# Patient Record
Sex: MALE | Race: WHITE | Employment: OTHER | URBAN - METROPOLITAN AREA
[De-identification: names, ages, dates, MRNs, and addresses within clinical notes are randomized per-mention and may not be internally consistent; named-entity substitution may affect disease eponyms.]

---

## 2017-01-18 ENCOUNTER — OFFICE VISIT (OUTPATIENT)
Dept: CARDIOLOGY CLINIC | Age: 67
End: 2017-01-18

## 2017-01-18 VITALS
SYSTOLIC BLOOD PRESSURE: 136 MMHG | WEIGHT: 168 LBS | HEIGHT: 67 IN | BODY MASS INDEX: 26.37 KG/M2 | HEART RATE: 60 BPM | DIASTOLIC BLOOD PRESSURE: 82 MMHG

## 2017-01-18 DIAGNOSIS — I49.3 PVC (PREMATURE VENTRICULAR CONTRACTION): Primary | ICD-10-CM

## 2017-01-18 DIAGNOSIS — E78.00 HYPERCHOLESTEREMIA: ICD-10-CM

## 2017-01-18 DIAGNOSIS — R00.2 PALPITATIONS: ICD-10-CM

## 2017-01-18 DIAGNOSIS — I34.1 MITRAL PROLAPSE: ICD-10-CM

## 2017-01-18 NOTE — PROGRESS NOTES
Cardiac Electrophysiology Office Note     Subjective: Suresh Yuan is a 74 y.o. male patient who is seen for evaluation of PVCs   He is kindly referred by Dr. Raul Villa. He is here today for annual follow up. He is on flecainide. He says he has daily palpitations that are very brief < 1 minute. No SOB or chest pain. Occasional chest cramp he thinks d/t hunger. No LE edema or recent hospitalizations. Past hx:  He reports he has PVCs for a \"long time\". For several years it has been transient and rarely bothered him. The past couple months he has noticed them more and thinks they have been occuring more frequently after spring break trip with 2 children in high school  He reports he feels the sensation of a skipped beat. Associated symptoms include \"queezy feeling\". He started the flecainide when he returned from his trip to [place]. So far he is tolerating the flecainide , no lightheadedness or dizziness. No chest pain or SOB. He had emailed us before his trip and reported that he thought the PVCs were actually bothering him more than he thought they did and that the PVCs were disturbing his sleep. He says that when he went on his trip he had no symptoms of PVCs (had not started flecainide yet). He started the flecainide when he got back and he thinks it is helping, but he sometimes has brief episodes of PVCs when he is exercising. Pmhx includes hyperlipidemia. Exercise stress nuclear test 8/2016 showed   Good exercise capacity. No ischemia on ECG, PVC, but there are frequent PACs  BP was very high 250/100 with exercise.  Perfusion images showed diaphragmatic attenuation artifacts in the inferior and inferolateral walls (base to distal)  GATED SPECT: LVEF 69% normal wall motion and thickening    Overall study: no ischemia or infarct  PACs but no PVC  Flecainide is effective in suppressing PVCs  BP is high and recommend BP medication: norvasc 5 mg every day to start    Holter before flecainide showed 33 thousands PVCs, mostly one morphology and appears to come from outflow tract but there is another PVC with superior axis and not coming from same place     Normal stress echo  and normal EF on recent echo        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 5 children (all in good health), . Half brother may have a pacemaker. Stress echo 12 with Dr Ev Thompson  Normal resting electrocardiogram.  Above average functional capacity (>20%).   Appropriate heart rate response to exercise.  Normal resting blood pressure.  Appropriate blood pressure response to exercise. No chest pain.  No arrhythmias.  No ST changes.  Overall impression: Normal stress electrocardiogram.  Resting echo with normal regional and  global contractility.  Post exercise, uniformly enhanced myocardial contractility. Patient Active Problem List    Diagnosis Date Noted    Advanced directives, counseling/discussion 2016    PVC (premature ventricular contraction) 2016    Erectile dysfunction 2016    Encounter for long-term (current) use of other medications 2015    Unspecified vitamin D deficiency 2015    Attention deficit disorder without mention of hyperactivity 2014    Proteinuria 2014    Elevated blood pressure reading without diagnosis of hypertension 2014    Tinnitus of both ears 2013    Urticaria 2013    Calculus of kidney 2013    Insomnia, unspecified 2013    Mitral prolapse 2012    Hypercholesteremia 2012     Current Outpatient Prescriptions   Medication Sig Dispense Refill    amLODIPine (NORVASC) 5 mg tablet Take 1 Tab by mouth daily. 90 Tab 1    atorvastatin (LIPITOR) 20 mg tablet Take 1 Tab by mouth daily.  90 Tab 1    flecainide (TAMBOCOR) 50 mg tablet Take 1 Tab by mouth two (2) times a day. 180 Tab 1    sildenafil citrate (VIAGRA) 100 mg tablet Take 1 Tab by mouth as needed. 6 Tab 11    zolpidem (AMBIEN) 5 mg tablet Take 1 Tab by mouth nightly as needed for Sleep. 30 Tab 1    aspirin delayed-release 81 mg tablet Take  by mouth daily. No Known Allergies  Past Medical History   Diagnosis Date    Calculus of kidney 2011    Depression     Hypercholesterolemia     Hyperlipidemia     Kidney stone     Mitral valve prolapse     Polycystic liver disease      Past Surgical History   Procedure Laterality Date    Hx urological  2002     vasectomy    Hx heent       uvulectomy    Endoscopy, colon, diagnostic  2005     polyp- dr Boyd Teague, 2012, due 14     Family History   Problem Relation Age of Onset    Heart Disease Mother     Stroke Father     Cancer Mother     Stroke Mother      Social History   Substance Use Topics    Smoking status: Former Smoker     Packs/day: 1.00     Years: 12.00     Types: Cigarettes    Smokeless tobacco: Never Used    Alcohol use 3.5 oz/week     7 Glasses of wine per week      Comment: wine 1/2 glasse per day         Review of Systems:   Constitutional: Negative for fever, chills, weight loss, malaise/fatigue. HEENT: Negative for nosebleeds, vision changes. Respiratory: Negative for cough, hemoptysis, sputum production, and wheezing. Cardiovascular: Negative for chest pain, + occasional palpitations, no orthopnea, claudication, leg swelling, syncope, and PND. Gastrointestinal: Negative for nausea, vomiting, diarrhea, constipation, blood in stool and melena. Genitourinary: Negative for dysuria, and hematuria. Musculoskeletal: Negative for myalgias, arthralgia. Skin: Negative for rash. Heme: Does not bleed or bruise easily.    Neurological: Negative for speech change and focal weakness     Objective:     Visit Vitals    /82 (BP 1 Location: Right arm, BP Patient Position: Sitting)    Pulse 60    Ht 5' 7\" (1.702 m)    Wt 168 lb (76.2 kg)    BMI 26.31 kg/m2      Physical Exam:   Constitutional: well-developed and well-nourished. No distress. Head: Normocephalic and atraumatic. Eyes: Pupils are equal, round  Neck: supple. No JVD present. Cardiovascular: Normal rate, regular rhythm and normal heart sounds. Exam reveals no gallop and no friction rub. No murmur heard. Pulmonary/Chest: Effort normal and breath sounds normal. No wheezes. Abdominal: Soft, no tenderness. flat  Musculoskeletal: no edema. Neurological: alert,oriented. Skin: Skin is warm and dry  Psychiatric: normal mood and affect. Behavior is normal. Judgment and thought content normal.      EKG: sinus rhythm HR 63 bpm, 1 st degree Av block      Assessment/Plan:       ICD-10-CM ICD-9-CM    1. PVC (premature ventricular contraction) I49.3 427.69 AMB POC EKG ROUTINE W/ 12 LEADS, INTER & REP   2. Hypercholesteremia E78.00 272.0 AMB POC EKG ROUTINE W/ 12 LEADS, INTER & REP   3. Mitral prolapse I34.1 424.0 AMB POC EKG ROUTINE W/ 12 LEADS, INTER & REP   ECG today shows normal QRS, unchanged from previous ECG. Will check annual LFTs. Tolerating flecainide without side effects. BP controlled, goal SBP < 140. He is compensated on exam with no acute s/s of CHF. Follow up in 6 months or sooner if needed. Follow-up Disposition: Not on File    Thank you for involving me in this patient's care and please call with further concerns or questions. Shelton Levi M.D.   Electrophysiology/Cardiology  [facility] [place]  [address], [address], [address]  [facility], [address] [place] [address]  [phone]

## 2017-01-18 NOTE — MR AVS SNAPSHOT
Visit Information Date & Time Provider Department Dept. Phone Encounter #  
 1/18/2017  8:20 AM Graciela Sanchez MD [facility] Feliciano Sarmiento [phone] [id number] Your Appointments 2/27/2017  9:00 AM  
COMPLETE PHYSICAL with Darren Vazquez MD  
[facility]) Appt Note: cpe  
 [address] Pending sale to [facility] [id number]  
[address] [facility] [address] Upcoming Health Maintenance Date Due Hepatitis C Screening 1950 DTaP/Tdap/Td series (1 - Tdap) 1/2/2008 MEDICARE YEARLY EXAM 5/28/2013 Pneumococcal 65+ Low/Medium Risk (2 of 2 - PCV13) 12/17/2014 GLAUCOMA SCREENING Q2Y 2/1/2018 COLONOSCOPY 8/8/2022 Allergies as of 1/18/2017  Review Complete On: 1/18/2017 By: Kristina Pérez NP No Known Allergies Current Immunizations  Reviewed on 9/21/2016 Name Date Influenza Vaccine 10/7/2015 Influenza Vaccine (Quad) PF 9/21/2016 Influenza Vaccine Whole 12/5/2012 Pneumococcal Polysaccharide (PPSV-23) 12/17/2013 Td 1/1/2008 Zoster Vaccine, Live 6/12/2014 Not reviewed this visit You Were Diagnosed With   
  
 Codes Comments PVC (premature ventricular contraction)    -  Primary ICD-10-CM: I49.3 ICD-9-CM: 427.69 Hypercholesteremia     ICD-10-CM: E78.00 ICD-9-CM: 272.0 Mitral prolapse     ICD-10-CM: I34.1 ICD-9-CM: 424.0 Vitals BP Pulse Height(growth percentile) Weight(growth percentile) BMI Smoking Status 136/82 (BP 1 Location: Right arm, BP Patient Position: Sitting) 60 5' 7\" (1.702 m) 168 lb (76.2 kg) 26.31 kg/m2 Former Smoker Vitals History BMI and BSA Data Body Mass Index Body Surface Area  
 26.31 kg/m 2 1.9 m 2 Preferred Pharmacy Pharmacy Name Phone [address] [facility] [phone] Your Updated Medication List  
  
   
 This list is accurate as of: 1/18/17  9:20 AM.  Always use your most recent med list. amLODIPine 5 mg tablet Commonly known as:  Zazueta Bjorn Take 1 Tab by mouth daily. aspirin delayed-release 81 mg tablet Take  by mouth daily. atorvastatin 20 mg tablet Commonly known as:  LIPITOR Take 1 Tab by mouth daily. flecainide 50 mg tablet Commonly known as:  TAMBOCOR Take 1 Tab by mouth two (2) times a day. sildenafil citrate 100 mg tablet Commonly known as:  VIAGRA Take 1 Tab by mouth as needed. zolpidem 5 mg tablet Commonly known as:  AMBIEN Take 1 Tab by mouth nightly as needed for Sleep. We Performed the Following AMB POC EKG ROUTINE W/ 12 LEADS, INTER & REP [84613 CPT(R)] METABOLIC PANEL, COMPREHENSIVE [48738 CPT(R)] REFERRAL TO SLEEP STUDIES [REF99 Custom] Comments:  
 Please evaluate patient for obstructive sleep apnea. Referral Information Referral ID Referred By Referred To  
  
 [id number] [facility], [address], 600 [facility] [address] Phone: [phone] Fax: [phone] Visits Status Start Date End Date 1 New Request 1/18/17 1/18/18 If your referral has a status of pending review or denied, additional information will be sent to support the outcome of this decision. Patient Instructions Follow up with Dr. Eliezer Henson in 6 months. You will be referred to sleep studies to rule out obstructive sleep apnea. Have labs drawn: CMP. You will need to go to [facility] to have this done. Introducing [facility]! Dear Valarie Andujar: 
Thank you for requesting a [employer] account. Our records indicate that you already have an active [employer] account. You can access your account anytime at [email or web address] Did you know that you can access your hospital and ER discharge instructions at any time in [employer]? You can also review all of your test results from your hospital stay or ER visit. Additional Information If you have questions, please visit the Frequently Asked Questions section of the [employer] website at [email or web address] Remember, [employer] is NOT to be used for urgent needs. For medical emergencies, dial 911. Now available from your iPhone and Android! Please provide this summary of care documentation to your next provider. Your primary care clinician is listed as REJI BOOGIE. If you have any questions after today's visit, please call [phone].

## 2017-01-18 NOTE — PROGRESS NOTES
Cardiac Electrophysiology Office Note     Subjective: Kalee Wray is a 74 y.o. male patient who is seen for evaluation of PVCs   He was kindly referred by Dr. Maya Hernandez. He is here today for follow up. He is on flecainide. He says he has palpitations that are very brief < 1 minute just 2 days and has been very quiet. No SOB or chest pain. Past hx:  He reports he has PVCs for a \"long time\". For several years it has been transient and rarely bothered him. The past couple months he has noticed them more and thinks they have been occuring more frequently after spring break trip with 2 children in high school  He reports he feels the sensation of a skipped beat. Associated symptoms include \"queezy feeling\". He started the flecainide when he returned from his trip to [facility]. So far he is tolerating the flecainide , no lightheadedness or dizziness. No chest pain or SOB. He had emailed us before his trip and reported that he thought the PVCs were actually bothering him more than he thought they did and that the PVCs were disturbing his sleep. He says that when he went on his trip he had no symptoms of PVCs (had not started flecainide yet). He started the flecainide when he got back and he thinks it is helping, but he sometimes has brief episodes of PVCs when he is exercising. Pmhx includes hyperlipidemia. Exercise stress nuclear test 8/2016 showed   Good exercise capacity. No ischemia on ECG, PVC, but there are frequent PACs  BP was very high 250/100 with exercise.  Perfusion images showed diaphragmatic attenuation artifacts in the inferior and inferolateral walls (base to distal)  GATED SPECT: LVEF 69% normal wall motion and thickening    Overall study: no ischemia or infarct  PACs but no PVC  Flecainide is effective in suppressing PVCs  BP is high and recommend BP medication: norvasc 5 mg every day to start    Holter before flecainide showed 33 thousands PVCs, mostly one morphology and appears to come from outflow tract but there is another PVC with superior axis and not coming from same place     Normal stress echo  and normal EF on recent echo        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 5 children (all in good health), . Half brother may have a pacemaker. Stress echo 12 with Dr Onur Mata  Normal resting electrocardiogram.  Above average functional capacity (>20%).   Appropriate heart rate response to exercise.  Normal resting blood pressure.  Appropriate blood pressure response to exercise. No chest pain.  No arrhythmias.  No ST changes.  Overall impression: Normal stress electrocardiogram.  Resting echo with normal regional and  global contractility.  Post exercise, uniformly enhanced myocardial contractility. Patient Active Problem List    Diagnosis Date Noted    Advanced directives, counseling/discussion 2016    PVC (premature ventricular contraction) 2016    Erectile dysfunction 2016    Encounter for long-term (current) use of other medications 2015    Unspecified vitamin D deficiency 2015    Attention deficit disorder without mention of hyperactivity 2014    Proteinuria 2014    Elevated blood pressure reading without diagnosis of hypertension 2014    Tinnitus of both ears 2013    Urticaria 2013    Calculus of kidney 2013    Insomnia, unspecified 2013    Mitral prolapse 2012    Hypercholesteremia 2012     Current Outpatient Prescriptions   Medication Sig Dispense Refill    amLODIPine (NORVASC) 5 mg tablet Take 1 Tab by mouth daily. 90 Tab 1    atorvastatin (LIPITOR) 20 mg tablet Take 1 Tab by mouth daily. 90 Tab 1    flecainide (TAMBOCOR) 50 mg tablet Take 1 Tab by mouth two (2) times a day.  180 Tab 1    sildenafil citrate (VIAGRA) 100 mg tablet Take 1 Tab by mouth as needed. 6 Tab 11    zolpidem (AMBIEN) 5 mg tablet Take 1 Tab by mouth nightly as needed for Sleep. 30 Tab 1    aspirin delayed-release 81 mg tablet Take  by mouth daily. No Known Allergies  Past Medical History   Diagnosis Date    Calculus of kidney 2011    Depression     Hypercholesterolemia     Hyperlipidemia     Kidney stone     Mitral valve prolapse     Polycystic liver disease      Past Surgical History   Procedure Laterality Date    Hx urological  2002     vasectomy    Hx heent       uvulectomy    Endoscopy, colon, diagnostic  2005     polyp- dr Gilberto Hsu, 2012, due 14     Family History   Problem Relation Age of Onset    Heart Disease Mother     Stroke Father     Cancer Mother     Stroke Mother      Social History   Substance Use Topics    Smoking status: Former Smoker     Packs/day: 1.00     Years: 12.00     Types: Cigarettes    Smokeless tobacco: Never Used    Alcohol use 3.5 oz/week     7 Glasses of wine per week      Comment: wine 1/2 glasse per day         Review of Systems:   Constitutional: Negative for fever, chills, weight loss, malaise/fatigue. HEENT: Negative for nosebleeds, vision changes. Respiratory: Negative for cough, hemoptysis, sputum production, and wheezing. Cardiovascular: Negative for chest pain, + occasional palpitations, no orthopnea, claudication, leg swelling, syncope, and PND. Gastrointestinal: Negative for nausea, vomiting, diarrhea, constipation, blood in stool and melena. Genitourinary: Negative for dysuria, and hematuria. Musculoskeletal: Negative for myalgias, arthralgia. Skin: Negative for rash. Heme: Does not bleed or bruise easily.    Neurological: Negative for speech change and focal weakness     Objective:     Visit Vitals    /82 (BP 1 Location: Right arm, BP Patient Position: Sitting)    Pulse 60    Ht 5' 7\" (1.702 m)    Wt 168 lb (76.2 kg)    BMI 26.31 kg/m2      Physical Exam: Constitutional: well-developed and well-nourished. No distress. Head: Normocephalic and atraumatic. Eyes: Pupils are equal, round  Neck: supple. No JVD present. Cardiovascular: Normal rate, regular rhythm and normal heart sounds. Exam reveals no gallop and no friction rub. No murmur heard. Pulmonary/Chest: Effort normal and breath sounds normal. No wheezes. Abdominal: Soft, no tenderness. Musculoskeletal: no edema. Neurological: alert,oriented. Skin: Skin is warm and dry  Psychiatric: normal mood and affect. Behavior is normal. Judgment and thought content normal.      EKG: sinus rhythm HR 63 bpm, 1 st degree Av block  QRS not wide      Assessment/Plan:       ICD-10-CM ICD-9-CM    1. PVC (premature ventricular contraction) I49.3 427.69 AMB POC EKG ROUTINE W/ 12 LEADS, INTER & REP      METABOLIC PANEL, COMPREHENSIVE      REFERRAL TO SLEEP STUDIES   2. Hypercholesteremia E78.00 272.0 AMB POC EKG ROUTINE W/ 12 LEADS, INTER & REP      METABOLIC PANEL, COMPREHENSIVE      REFERRAL TO SLEEP STUDIES   3. Mitral prolapse I34.1 424.0 AMB POC EKG ROUTINE W/ 12 LEADS, INTER & REP      METABOLIC PANEL, COMPREHENSIVE      REFERRAL TO SLEEP STUDIES   4. Palpitations R00.2 785. 1    ECG today shows normal QRS, unchanged from previous ECG. Will check annual LFTs with PCP  Tolerating flecainide without side effects. He felt his sx well controlled  No EP study and ablation for now  BP controlled, goal SBP < 140. He is compensated on exam with no acute s/s of CHF. Follow-up Disposition:  Return in about 6 months (around 7/18/2017). Thank you for involving me in this patient's care and please call with further concerns or questions. Buck Pugh M.D.   Electrophysiology/Cardiology  [facility] [place]  [address], [address] , [address]  [address]  [phone]

## 2017-01-18 NOTE — PROGRESS NOTES
Chief Complaint   Patient presents with    Irregular Heart Beat    Follow-up     6 month follow up     Verified medications with the patient.

## 2017-01-18 NOTE — PATIENT INSTRUCTIONS
Follow up with Dr. Trenton Shaw in 6 months. You will be referred to sleep studies to rule out obstructive sleep apnea. Have labs drawn: CMP. You will need to go to [facility] to have this done.

## 2017-03-21 ENCOUNTER — OFFICE VISIT (OUTPATIENT)
Dept: INTERNAL MEDICINE CLINIC | Age: 67
End: 2017-03-21

## 2017-03-21 VITALS
OXYGEN SATURATION: 96 % | WEIGHT: 168.4 LBS | TEMPERATURE: 98 F | SYSTOLIC BLOOD PRESSURE: 130 MMHG | HEIGHT: 67 IN | BODY MASS INDEX: 26.43 KG/M2 | HEART RATE: 60 BPM | RESPIRATION RATE: 18 BRPM | DIASTOLIC BLOOD PRESSURE: 80 MMHG

## 2017-03-21 DIAGNOSIS — Z12.5 PROSTATE CANCER SCREENING: ICD-10-CM

## 2017-03-21 DIAGNOSIS — Z12.11 SCREEN FOR COLON CANCER: ICD-10-CM

## 2017-03-21 DIAGNOSIS — Z11.59 NEED FOR HEPATITIS C SCREENING TEST: ICD-10-CM

## 2017-03-21 DIAGNOSIS — Z23 ENCOUNTER FOR IMMUNIZATION: ICD-10-CM

## 2017-03-21 DIAGNOSIS — Z00.00 PHYSICAL EXAM: Primary | ICD-10-CM

## 2017-03-21 DIAGNOSIS — Z23 NEED FOR TDAP VACCINATION: ICD-10-CM

## 2017-03-21 NOTE — MR AVS SNAPSHOT
Visit Information Date & Time Provider Department Dept. Phone Encounter #  
 3/21/2017  3:45 PM Ciarra Reynolds, [address] [place] [address] Internal Medicine [phone] [id number] Follow-up Instructions Return in about 1 year (around 3/21/2018). Your Appointments 3/22/2017  9:00 AM  
New Patient with Sarai Epperson MD  
[address] [facility] ([facility]) Appt Note: Hypercholesteremia ref Dr. Sana Saul; NP refd by Hypercholesteremia ref Dr. Snaa Saul [facility] [phone] 7/19/2017  8:20 AM  
ESTABLISHED PATIENT with Anabela Pozo MD  
[facility] ([facility]) Appt Note: 6 month follow up per Dr. Elder Quinn [address]  [address] [place] [address]  
[address] [facility] [address] Upcoming Health Maintenance Date Due Hepatitis C Screening 1950 DTaP/Tdap/Td series (1 - Tdap) 1/2/2008 Pneumococcal 65+ Low/Medium Risk (2 of 2 - PCV13) 12/17/2014 GLAUCOMA SCREENING Q2Y 2/1/2018 MEDICARE YEARLY EXAM 3/22/2018 COLONOSCOPY 8/8/2022 Allergies as of 3/21/2017  Review Complete On: 3/21/2017 By: Ciarra Reynolds MD  
 No Known Allergies Current Immunizations  Reviewed on 3/21/2017 Name Date Influenza Vaccine 10/7/2015 Influenza Vaccine (Quad) PF 9/21/2016 Influenza Vaccine Whole 12/5/2012 Pneumococcal Polysaccharide (PPSV-23) 12/17/2013 Td 1/1/2008 Tdap  Incomplete Zoster Vaccine, Live 6/12/2014 Reviewed by Ciarra Reynolds MD on 3/21/2017 at  4:33 PM  
You Were Diagnosed With   
  
 Codes Comments Physical exam    -  Primary ICD-10-CM: Z00.00 ICD-9-CM: V70.9 Encounter for immunization     ICD-10-CM: C04 ICD-9-CM: V03.89 Need for Tdap vaccination     ICD-10-CM: F77 ICD-9-CM: V06.1 Need for hepatitis C screening test     ICD-10-CM: Z11.59 
ICD-9-CM: V73.89 Prostate cancer screening     ICD-10-CM: Z12.5 ICD-9-CM: V76.44 Screen for colon cancer     ICD-10-CM: Z12.11 ICD-9-CM: V76.51 Vitals BP Pulse Temp Resp Height(growth percentile) Weight(growth percentile) 130/80 (BP 1 Location: Right arm, BP Patient Position: Sitting) 60 98 °F (36.7 °C) (Oral) 18 5' 7\" (1.702 m) 168 lb 6.4 oz (76.4 kg) SpO2 BMI Smoking Status 96% 26.38 kg/m2 Former Smoker BMI and BSA Data Body Mass Index Body Surface Area  
 26.38 kg/m 2 1.9 m 2 Preferred Pharmacy Pharmacy Name Phone [address], [facility] [phone] Your Updated Medication List  
  
   
This list is accurate as of: 3/21/17  4:49 PM.  Always use your most recent med list. amLODIPine 5 mg tablet Commonly known as:  Wandra Kaylah Take 1 Tab by mouth daily. aspirin delayed-release 81 mg tablet Take  by mouth daily. atorvastatin 20 mg tablet Commonly known as:  LIPITOR Take 1 Tab by mouth daily. flecainide 50 mg tablet Commonly known as:  TAMBOCOR Take 1 Tab by mouth two (2) times a day. pneumococcal 13 gus conj dip 0.5 mL Syrg injection Commonly known as:  PREVNAR-13  
0.5 mL by IntraMUSCular route once for 1 dose. sildenafil citrate 100 mg tablet Commonly known as:  VIAGRA Take 1 Tab by mouth as needed. zolpidem 5 mg tablet Commonly known as:  AMBIEN Take 1 Tab by mouth nightly as needed for Sleep. Prescriptions Printed Refills  
 pneumococcal 13 gus conj dip (PREVNAR-13) 0.5 mL syrg injection 0 Si.5 mL by IntraMUSCular route once for 1 dose. Class: Print Route: IntraMUSCular We Performed the Following CBC WITH AUTOMATED DIFF [20208 CPT(R)] HEMOGLOBIN A1C WITH EAG [09209 CPT(R)] HEPATITIS C AB [10189 CPT(R)] LIPID PANEL [78110 CPT(R)] METABOLIC PANEL, COMPREHENSIVE [47068 CPT(R)] PSA SCREENING (SCREENING) [ [facility]] REFERRAL TO GASTROENTEROLOGY [HYV67 Custom] TETANUS, DIPHTHERIA TOXOIDS AND ACELLULAR PERTUSSIS VACCINE (TDAP), IN INDIVIDS. >=7, IM [id number] CPT(R)] TSH 3RD GENERATION [77158 CPT(R)] URINALYSIS W/ RFLX MICROSCOPIC [44806 CPT(R)] VITAMIN D, 25 HYDROXY [id number] CPT(R)] Follow-up Instructions Return in about 1 year (around 3/21/2018). Referral Information Referral ID Referred By Referred To  
  
 [id number] Lawrence BOOGIE MD   
   [address] [place] [address] [address] [place] [address] Phone: [phone] Fax: [phone] Visits Status Start Date End Date 1 New Request 3/21/17 3/21/18 If your referral has a status of pending review or denied, additional information will be sent to support the outcome of this decision. Introducing [facility]! Dear Quintin Alegria: 
Thank you for requesting a [employer] account. Our records indicate that you already have an active [employer] account. You can access your account anytime at [email or web address] Did you know that you can access your hospital and ER discharge instructions at any time in [employer]? You can also review all of your test results from your hospital stay or ER visit. Additional Information If you have questions, please visit the Frequently Asked Questions section of the [employer] website at [email or web address] Remember, [employer] is NOT to be used for urgent needs. For medical emergencies, dial 911. Now available from your iPhone and Android! Please provide this summary of care documentation to your next provider. Your primary care clinician is listed as REJI BOOGIE. If you have any questions after today's visit, please call [phone].

## 2017-03-21 NOTE — PROGRESS NOTES
HISTORY OF PRESENT ILLNESS  Lorena Rosenthal is a 74 y.o. male. hospitals Mariann Padilla is seen today for a CPE and follow up of chronic problems. Preventive medicine. Fully reviewed today. He is due for a complete physical examination and routine screening laboratory studies. Also, due for Tdap booster, Prevnar vaccine, colonoscopy. He is up to date with a baseline EKG with his cardiologist.    Chronic medical problems are reviewed. 1. Cholesterol is due. Up to date with cardiology follow up. 2. Blood pressure is fine. Review of systems notable for nasal congestion for the last 5-6 months. He will try a trial of Flonase spray. If unhelpful, will add Allegra prn. For skin irritation, he is seeing his dermatologist.    [employer]arnaudo     We counseled regarding healthy lifestyle issues including diet, exercise and stress management. Family history, social history, etc. Are reviewed and updated, see electronic record. Review of Systems   Constitutional: Negative for weight loss. Respiratory: Negative. Cardiovascular: Negative for chest pain, palpitations, leg swelling and PND. Gastrointestinal: Negative. Genitourinary: Negative. Musculoskeletal: Negative for myalgias. Neurological: Negative for focal weakness. All other systems reviewed and are negative. Physical Exam   Constitutional: He is oriented to person, place, and time. He appears well-developed and well-nourished. No distress. HENT:   Head: Normocephalic and atraumatic. Right Ear: Tympanic membrane, external ear and ear canal normal.   Left Ear: Tympanic membrane, external ear and ear canal normal.   Eyes: EOM are normal. Pupils are equal, round, and reactive to light. Right eye exhibits no discharge. Left eye exhibits no discharge. Neck: Normal range of motion. Neck supple. Carotid bruit is not present. No thyromegaly present.    Cardiovascular: Normal rate, regular rhythm, normal heart sounds and intact distal pulses. Exam reveals no gallop and no friction rub. No murmur heard. Pulmonary/Chest: Effort normal and breath sounds normal. No respiratory distress. He has no wheezes. He has no rales. Abdominal: Soft. Bowel sounds are normal. He exhibits no distension and no mass. There is no tenderness. There is no rebound and no guarding. Genitourinary: Rectum normal. Rectal exam shows no mass and no tenderness. Prostate is enlarged. Prostate is not tender. Musculoskeletal: Normal range of motion. He exhibits no edema or tenderness. Lymphadenopathy:     He has no cervical adenopathy. Neurological: He is alert and oriented to person, place, and time. He has normal reflexes. Skin: Skin is warm and dry. No rash noted. Psychiatric: He has a normal mood and affect. His behavior is normal.   Nursing note and vitals reviewed. ASSESSMENT and PLAN  Lu Summers was seen today for complete physical and nasal congestion. Diagnoses and all orders for this visit:    Physical exam  -     TSH 3RD GENERATION  -     VITAMIN D, 25 HYDROXY  -     URINALYSIS W/ RFLX MICROSCOPIC  -     METABOLIC PANEL, COMPREHENSIVE  -     CBC WITH AUTOMATED DIFF  -     HEMOGLOBIN A1C WITH EAG  -     LIPID PANEL    Encounter for immunization    Need for Tdap vaccination  -     TETANUS, DIPHTHERIA TOXOIDS AND ACELLULAR PERTUSSIS VACCINE (TDAP), IN INDIVIDS. >=7, IM    Need for hepatitis C screening test  -     HEPATITIS C AB    Prostate cancer screening  -     PSA SCREENING (SCREENING)  -     pneumococcal 13 gus conj dip (PREVNAR-13) 0.5 mL syrg injection; 0.5 mL by IntraMUSCular route once for 1 dose. Screen for colon cancer  -     REFERRAL TO GASTROENTEROLOGY    Other orders  -     Cancel: pneumococcal 13 gus conj dip (PREVNAR-13) 0.5 mL syrg injection; 0.5 mL by IntraMUSCular route once for 1 dose.   -     Cancel: TETANUS, DIPHTHERIA TOXOIDS AND ACELLULAR PERTUSSIS VACCINE (TDAP), IN INDIVIDS. >=7, IM

## 2017-03-22 ENCOUNTER — OFFICE VISIT (OUTPATIENT)
Dept: SLEEP MEDICINE | Age: 67
End: 2017-03-22

## 2017-03-22 VITALS
WEIGHT: 169 LBS | OXYGEN SATURATION: 95 % | HEART RATE: 60 BPM | SYSTOLIC BLOOD PRESSURE: 131 MMHG | DIASTOLIC BLOOD PRESSURE: 84 MMHG | BODY MASS INDEX: 26.53 KG/M2 | HEIGHT: 67 IN

## 2017-03-22 DIAGNOSIS — E66.3 OVERWEIGHT (BMI 25.0-29.9): ICD-10-CM

## 2017-03-22 DIAGNOSIS — F51.03 HYPOSOMNIA, INSOMNIA, OR SLEEPLESSNESS ASSOCIATED WITH CONDITIONED AROUSAL: ICD-10-CM

## 2017-03-22 DIAGNOSIS — G47.33 OSA (OBSTRUCTIVE SLEEP APNEA): Primary | ICD-10-CM

## 2017-03-22 NOTE — PROGRESS NOTES
[address] [facility] [place], [address]  Tel.  [phone]  Fax. 100 [facility] 60  [place], [address] [facility]  Tel.  [phone]  Fax. [phone] [address] [facility] Lucille Ibarra   Tel.  [phone]  Fax. [phone]         Subjective: Marissa Davidson is an 74 y.o. male referred for evaluation for a sleep disorder. He complains of awakening in the middle of the night because of palpitations associated with snoring. Symptoms began several years ago, rapidly improving since that time. He usually can fall asleep in 10 minutes. Family or house members note snoring. He denies completely or partially paralyzed while falling asleep or waking up. Marissa Davidson does wake up frequently at night. He is bothered by waking up too early and left unable to get back to sleep. He actually sleeps about 7 hours at night and wakes up about 3 times during the night. He   work shifts:  . Mian Vargas indicates he does get too little sleep at night. His bedtime is 2200. He awakens at 0500. He does not take naps. . He has the following observed behaviors: Loud snoring, Light snoring, Grinding teeth;  . Other remarks:      Exmore Sleepiness Score: 3   which reflect mild daytime drowsiness. No Known Allergies      Current Outpatient Prescriptions:     amLODIPine (NORVASC) 5 mg tablet, Take 1 Tab by mouth daily. , Disp: 90 Tab, Rfl: 1    atorvastatin (LIPITOR) 20 mg tablet, Take 1 Tab by mouth daily. , Disp: 90 Tab, Rfl: 1    flecainide (TAMBOCOR) 50 mg tablet, Take 1 Tab by mouth two (2) times a day., Disp: 180 Tab, Rfl: 1    aspirin delayed-release 81 mg tablet, Take  by mouth daily. , Disp: , Rfl:     sildenafil citrate (VIAGRA) 100 mg tablet, Take 1 Tab by mouth as needed. , Disp: 6 Tab, Rfl: 11    zolpidem (AMBIEN) 5 mg tablet, Take 1 Tab by mouth nightly as needed for Sleep., Disp: 30 Tab, Rfl: 1     He  has a past medical history of Calculus of kidney (2011); Depression; Hypercholesterolemia; Hyperlipidemia; Kidney stone; Mitral valve prolapse; and Polycystic liver disease. He  has a past surgical history that includes urological (2002); heent; and endoscopy, colon, diagnostic (2005). He family history includes Cancer in his mother; Heart Disease in his mother; No Known Problems in his brother and sister; Stroke in his father and mother. He  reports that he has quit smoking. His smoking use included Cigarettes. He has a 12.00 pack-year smoking history. He has never used smokeless tobacco. He reports that he drinks alcohol. He reports that he does not use illicit drugs. Review of Systems:  Constitutional:  No significant weight loss or weight gain. Eyes:  No blurred vision. CVS:  No significant chest pain  Pulm:  No significant shortness of breath  GI:  No significant nausea or vomiting  :  No significant nocturia  Musculoskeletal:  No significant joint pain at night  Skin:  No significant rashes  Neuro:  No significant dizziness   Psych:  No active mood issues    Sleep Review of Systems: notable for no difficulty falling asleep; infrequent awakenings at night;  regular dreaming noted; no nightmares ; no early morning headaches; no memory problems; no concentration issues; no history of any automobile or occupational accidents due to daytime drowsiness. Objective:     Visit Vitals    /84    Pulse 60    Ht 5' 7\" (1.702 m)    Wt 169 lb (76.7 kg)    SpO2 95%    BMI 26.47 kg/m2         General:   Not in acute distress   Eyes:  Anicteric sclerae, no obvious strabismus   Nose:  No obvious nasal septum deviation    Oropharynx:   Class 3 oropharyngeal outlet, thick tongue base, absent uvula, low-lying soft palate, narrow tonsilo-pharyngeal pilars   Tonsils:   tonsils are unappreciated   Neck:   Neck circ.  in \"inches\": 13.5; midline trachea   Chest/Lungs:  Equal lung expansion, clear on auscultation    CVS:  Normal rate, regular rhythm; no JVD   Skin:  Warm to touch; no obvious rashes   Neuro:  No focal deficits ; no obvious tremor    Psych:  Normal affect,  normal countenance;          Assessment:       ICD-10-CM ICD-9-CM    1. BRANDON (obstructive sleep apnea) G47.33 327.23    2. 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 Overweight (BMI 25.0-29. 9) E66.3 278.02          Plan:     * The patient currently has a Moderate Risk for having sleep apnea. STOP-BANG score 4.  * HSAT was ordered for initial evaluation. * He was provided information on sleep apnea including coresponding risk factors and the importance of proper treatment. * Counseling was provided regarding proper sleep hygiene and safe driving. * We'll call him with test results. * The problem of recurrent insomnia is discussed. Avoidance of caffeine sources is strongly encouraged. Sleep hygiene issues are reviewed. The use of sedative hypnotics for temporary relief may be appropriate for a short 2-3 week course; we discussed the addictive nature of these drugs and counseled him on stress mangement. I have reviewed/discussed the above normal BMI with the patient. I have recommended the following interventions: dietary management education, guidance, and counseling . The plan is as follows: I have counseled this patient on diet and exercise regimens. .    Thank you for allowing us to participate in your patient's medical care. We'll keep you updated on these investigations.     Luciano Vance M.D.  (electronically signed)  Diplomate in Sleep Medicine, SHAMAR

## 2017-03-22 NOTE — PROGRESS NOTES
[address] [facility] [place]. [place], [address]  Tel.  [phone]  Fax. 100 [facility] 60  [place], [address] [facility]  Tel.  [phone]  Fax. [phone] 9250 [facility] [place]   Tel.  [phone]  Fax. [phone]       S>Lionel Perales is a 74 y.o. male seen today to receive a home sleep testing unit (HST). · Patient was educated on proper hookup and operation of the HST. · Instruction forms and documentation were reviewed and signed. · The patient demonstrated good understanding of the HST. O>    Visit Vitals    /84    Pulse 60    Ht 5' 7\" (1.702 m)    Wt 169 lb (76.7 kg)    SpO2 95%    BMI 26.47 kg/m2    Neck circ. in \"inches\": 13.5    A>  1. BRANDON (obstructive sleep apnea)    2. Hyposomnia, insomnia, or sleeplessness associated with conditioned arousal    3. Overweight (BMI 25.0-29. 9)          P>  · General information regarding operations and maintenance of the device was provided. · He was provided information on sleep apnea including coresponding risk factors and the importance of proper treatment. · Follow-up appointment was made to return the HST. He will be contacted once the results have been reviewed. · He was asked to contact our office for any problems regarding his home sleep test study.

## 2017-03-22 NOTE — PATIENT INSTRUCTIONS
[address] [facility] [place]. [place], [address]  Tel.  [phone]  Fax. 100 [facility] 60  [place], [address] [facility]  Tel.  [phone]  Fax. [phone] [address] [address] Lucille Ibarra  Tel.  [phone]  Fax. [phone]     Sleep Apnea: After Your Visit  Your Care Instructions  Sleep apnea occurs when you frequently stop breathing for 10 seconds or longer during sleep. It can be mild to severe, based on the number of times per hour that you stop breathing or have slowed breathing. Blocked or narrowed airways in your nose, mouth, or throat can cause sleep apnea. Your airway can become blocked when your throat muscles and tongue relax during sleep. Sleep apnea is common, occurring in 1 out of 20 individuals. Individuals having any of the following characteristics should be evaluated and treated right away due to high risk and detrimental consequences from untreated sleep apnea:  1. Obesity  2. Congestive Heart failure  3. Atrial Fibrillation  4. Uncontrolled Hypertension  5. Type II Diabetes  6. Night-time Arrhythmias  7. Stroke  8. Pulmonary Hypertension  9. High-risk Driving Populations (pilots, truck drivers, etc.)  10. Patients Considering Weight-loss Surgery    How do you know you have sleep apnea? You probably have sleep apnea if you answer 'yes' to 3 or more of the following questions:  S - Have you been told that you Snore? T - Are you often Tired during the day? O - Has anyone Observed you stop breathing while sleeping? P- Do you have (or are being treated for) high blood Pressure? B - Are you obese (Body Mass Index > 35)? A - Is your Age 48years old or older? N - Is your Neck size greater than 16 inches? G - Are you male Gender? A sleep physician can prescribe a breathing device that prevents tissues in the throat from blocking your airway.  Or your doctor may recommend using a dental device (oral breathing device) to help keep your airway open. In some cases, surgery may be needed to remove enlarged tissues in the throat. Follow-up care is a key part of your treatment and safety. Be sure to make and go to all appointments, and call your doctor if you are having problems. It's also a good idea to know your test results and keep a list of the medicines you take. How can you care for yourself at home? · Lose weight, if needed. It may reduce the number of times you stop breathing or have slowed breathing. · Go to bed at the same time every night. · Sleep on your side. It may stop mild apnea. If you tend to roll onto your back, sew a pocket in the back of your pajama top. Put a tennis ball into the pocket, and stitch the pocket shut. This will help keep you from sleeping on your back. · Avoid alcohol and medicines such as sleeping pills and sedatives before bed. · Do not smoke. Smoking can make sleep apnea worse. If you need help quitting, talk to your doctor about stop-smoking programs and medicines. These can increase your chances of quitting for good. · Prop up the head of your bed 4 to 6 inches by putting bricks under the legs of the bed. · Treat breathing problems, such as a stuffy nose, caused by a cold or allergies. · Use a continuous positive airway pressure (CPAP) breathing machine if lifestyle changes do not help your apnea and your doctor recommends it. The machine keeps your airway from closing when you sleep. · If CPAP does not help you, ask your doctor whether you should try other breathing machines. A bilevel positive airway pressure machine has two types of air pressureâone for breathing in and one for breathing out. Another device raises or lowers air pressure as needed while you breathe. · If your nose feels dry or bleeds when using one of these machines, talk with your doctor about increasing moisture in the air. A humidifier may help.   · If your nose is runny or stuffy from using a breathing machine, talk with your doctor about using decongestants or a corticosteroid nasal spray. When should you call for help? Watch closely for changes in your health, and be sure to contact your doctor if:  · You still have sleep apnea even though you have made lifestyle changes. · You are thinking of trying a device such as CPAP. · You are having problems using a CPAP or similar machine. Where can you learn more? Go to [employer] (TTR). Enter B737 in the search box to learn more about \"Sleep Apnea: After Your Visit. \"   © 2046-6657 Healthwise, Incorporated. Care instructions adapted under license by Lonnie Hernandez (which disclaims liability or warranty for this information). This care instruction is for use with your licensed healthcare professional. If you have questions about a medical condition or this instruction, always ask your healthcare professional. Dhiraj Chang any warranty or liability for your use of this information. PROPER SLEEP HYGIENE    What to avoid  · Do not have drinks with caffeine, such as coffee or black tea, for 8 hours before bed. · Do not smoke or use other types of tobacco near bedtime. Nicotine is a stimulant and can keep you awake. · Avoid drinking alcohol late in the evening, because it can cause you to wake in the middle of the night. · Do not eat a big meal close to bedtime. If you are hungry, eat a light snack. · Do not drink a lot of water close to bedtime, because the need to urinate may wake you up during the night. · Do not read or watch TV in bed. Use the bed only for sleeping and sexual activity. What to try  · Go to bed at the same time every night, and wake up at the same time every morning. Do not take naps during the day. · Keep your bedroom quiet, dark, and cool. · Get regular exercise, but not within 3 to 4 hours of your bedtime. .  · Sleep on a comfortable pillow and mattress.   · If watching the clock makes you anxious, turn it facing away from you so you cannot see the time. · If you worry when you lie down, start a worry book. Well before bedtime, write down your worries, and then set the book and your concerns aside. · Try meditation or other relaxation techniques before you go to bed. · If you cannot fall asleep, get up and go to another room until you feel sleepy. Do something relaxing. Repeat your bedtime routine before you go to bed again. · Make your house quiet and calm about an hour before bedtime. Turn down the lights, turn off the TV, log off the computer, and turn down the volume on music. This can help you relax after a busy day. Drowsy Driving  The [address] [place] [address] Road Traffic Safety Administration cites drowsiness as a causing factor in more than 392,980 police reported crashes annually, resulting in 76,000 injuries and 1,500 deaths. Other surveys suggest 55% of people polled have driven while drowsy in the past year, 23% had fallen asleep but not crashed, 3% crashed, and 2% had and accident due to drowsy driving. Who is at risk? Young Drivers: One study of drowsy driving accidents states that 55% of the drivers were under 25 years. Of those, 75% were male. Shift Workers and Travelers: People who work overnight or travel across time zones frequently are at higher risk of experiencing Circadian Rhythm Disorders. They are trying to work and function when their body is programed to sleep. Sleep Deprived: Lack of sleep has a serious impact on your ability to pay attention or focus on a task. Consistently getting less than the average of 8 hours your body needs creates partial or cumulative sleep deprivation. Untreated Sleep Disorders: Sleep Apnea, Narcolepsy, R.L.S., and other sleep disorders (untreated) prevent a person from getting enough restful sleep. This leads to excessive daytime sleepiness and increases the risk for drowsy driving accidents by up to 7 times.   Medications / Alcohol: Even over the counter medications can cause drowsiness. Medications that impair a drivers attention should have a warning label. Alcohol naturally makes you sleepy and on its own can cause accidents. Combined with excessive drowsiness its effects are amplified. Signs of Drowsy Driving:   * You don't remember driving the last few miles   * You may drift out of your tessa   * You are unable to focus and your thoughts wander   * You may yawn more often than normal   * You have difficulty keeping your eyes open / nodding off   * Missing traffic signs, speeding, or tailgating  Prevention-   Good sleep hygiene, lifestyle and behavioral choices have the most impact on drowsy driving. There is no substitute for sleep and the average person requires 8 hours nightly. If you find yourself driving drowsy, stop and sleep. Consider the sleep hygiene tips provided during your visit as well. Medication Refill Policy: Refills for all medications require 1 week advance notice. Please have your pharmacy fax a refill request. We are unable to fax, or call in \"controled substance\" medications and you will need to pick these prescriptions up from our office. [employer] Activation    Thank you for requesting access to [employer]. Please follow the instructions below to securely access and download your online medical record. [employer] allows you to send messages to your doctor, view your test results, renew your prescriptions, schedule appointments, and more. How Do I Sign Up? 1. In your internet browser, go to [email or web address]. 2. Click on the First Time User? Click Here link in the Sign In box. You will see the New Member Sign Up page. 3. Enter your [employer] Access Code exactly as it appears below. You will not need to use this code after youve completed the sign-up process. If you do not sign up before the expiration date, you must request a new code. [employer] Access Code:  Activation code not generated  Current [employer] Status: Active (This is the date your [email or web address] access code will )    4. Enter the last four digits of your Social Security Number (xxxx) and Date of Birth (mm/dd/yyyy) as indicated and click Submit. You will be taken to the next sign-up page. 5. Create a rapt.fm ID. This will be your rapt.fm login ID and cannot be changed, so think of one that is secure and easy to remember. 6. Create a rapt.fm password. You can change your password at any time. 7. Enter your Password Reset Question and Answer. This can be used at a later time if you forget your password. 8. Enter your e-mail address. You will receive e-mail notification when new information is available in [address] 9. Click Sign Up. You can now view and download portions of your medical record. 10. Click the Download Summary menu link to download a portable copy of your medical information. Additional Information    If you have questions, please call [phone]. Remember, rapt.fm is NOT to be used for urgent needs. For medical emergencies, dial 911. Starting a Weight Loss Plan: After Your Visit  Your Care Instructions  If you are thinking about losing weight, it can be hard to know where to start. Your doctor can help you set up a weight loss plan that best meets your needs. You may want to take a class on nutrition or exercise, or join a weight loss support group. If you have questions about how to make changes to your eating or exercise habits, ask your doctor about seeing a registered dietitian or an exercise specialist.  It can be a big challenge to lose weight. But you do not have to make huge changes at once. Make small changes, and stick with them. When those changes become habit, add a few more changes. If you do not think you are ready to make changes right now, try to pick a date in the future. Make an appointment to see your doctor to discuss whether the time is right for you to start a plan.   Follow-up care is a key part of your treatment and safety. Be sure to make and go to all appointments, and call your doctor if you are having problems. It's also a good idea to know your test results and keep a list of the medicines you take. How can you care for yourself at home? · Set realistic goals. Many people expect to lose much more weight than is likely. A weight loss of 5% to 10% of your body weight may be enough to improve your health. · Get family and friends involved to provide support. Talk to them about why you are trying to lose weight, and ask them to help. They can help by participating in exercise and having meals with you, even if they may be eating something different. · Find what works best for you. If you do not have time or do not like to cook, a program that offers meal replacement bars or shakes may be better for you. Or if you like to prepare meals, finding a plan that includes daily menus and recipes may be best.  · Ask your doctor about other health professionals who can help you achieve your weight loss goals. ¨ A dietitian can help you make healthy changes in your diet. ¨ An exercise specialist or  can help you develop a safe and effective exercise program.  ¨ A counselor or psychiatrist can help you cope with issues such as depression, anxiety, or family problems that can make it hard to focus on weight loss. · Consider joining a support group for people who are trying to lose weight. Your doctor can suggest groups in your area. Where can you learn more? Go to eSight.be  Enter U357 in the search box to learn more about \"Starting a Weight Loss Plan: After Your Visit. \"   © 0236-4610 Healthwise, Incorporated. Care instructions adapted under license by Romayne Duster (which disclaims liability or warranty for this information).  This care instruction is for use with your licensed healthcare professional. If you have questions about a medical condition or this instruction, always ask your healthcare professional. [place] [address] any warranty or liability for your use of this information.   Content Version: 5.8.84581; Last Revised: September 1, 2009

## 2017-03-23 ENCOUNTER — TELEPHONE (OUTPATIENT)
Dept: SLEEP MEDICINE | Age: 67
End: 2017-03-23

## 2017-03-23 NOTE — TELEPHONE ENCOUNTER
HST positive for sleep apnea. Follow-up appointment will be scheduled to discuss test results and treatment options.

## 2017-03-29 ENCOUNTER — DOCUMENTATION ONLY (OUTPATIENT)
Dept: SLEEP MEDICINE | Age: 67
End: 2017-03-29

## 2017-03-29 ENCOUNTER — OFFICE VISIT (OUTPATIENT)
Dept: SLEEP MEDICINE | Age: 67
End: 2017-03-29

## 2017-03-29 VITALS
SYSTOLIC BLOOD PRESSURE: 128 MMHG | HEIGHT: 67 IN | DIASTOLIC BLOOD PRESSURE: 78 MMHG | OXYGEN SATURATION: 96 % | BODY MASS INDEX: 26.53 KG/M2 | HEART RATE: 59 BPM | WEIGHT: 169 LBS

## 2017-03-29 DIAGNOSIS — F51.03 HYPOSOMNIA, INSOMNIA, OR SLEEPLESSNESS ASSOCIATED WITH CONDITIONED AROUSAL: ICD-10-CM

## 2017-03-29 DIAGNOSIS — G47.33 OSA (OBSTRUCTIVE SLEEP APNEA): Primary | ICD-10-CM

## 2017-03-29 NOTE — PROGRESS NOTES
[address] [facility], [address] [place]. [place], [address]  Tel.  [phone]  Fax. 100 [facility] 60  [place], [address]  Tel.  [phone]  Fax. [phone] [address] [facility] Lucille Ibarra   Tel.  [phone]  Fax. [phone]     S>Lionel Johnson is a 74 y.o. male seen for No chief complaint on file. Polysomnogram was performed and the results of the study were explained to the patient. Please refer to interpretation report for further details. Apnea/Hypopnea index of 13 which indicates significant apnea. He continues to have snoring, excessive daytime sleepiness. No Known Allergies    He has a current medication list which includes the following prescription(s): amlodipine, atorvastatin, flecainide, sildenafil citrate, zolpidem, and aspirin delayed-release. Fern Jack He  has a past medical history of Calculus of kidney (2011); Depression; Hypercholesterolemia; Hyperlipidemia; Kidney stone; Mitral valve prolapse; and Polycystic liver disease. O>    Visit Vitals    /78    Pulse (!) 59    Ht 5' 7\" (1.702 m)    Wt 169 lb (76.7 kg)    SpO2 96%    BMI 26.47 kg/m2           General:   Alert, oriented, not in distress   Neck:   No JVD    Chest/Lungs:  symetrical lung expansion , no accessory muscle use    Extremities:  no obvious rashes , negative edema    Neuro:  No focal deficits ; No obvious tremor    Psych:  Normal affect ,  Normal countenance ;       A>    ICD-10-CM ICD-9-CM    1. BRANDON (obstructive sleep apnea) G47.33 327.23 REFERRAL TO DENTISTRY   2. 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 He has elected to proceed with a trial of using an Oral Device (Mandibular Advancing Device, Tongue Retention Device, etc.) which has been shown to be effective treatment for obstructive sleep apnea.   * We have referred the patient to Dentistry for appliance fitting and adjustments. * Repeat polysomnogram is indicated 3 month following oral device initiation to gauge treatment success. * Counseling was provided regarding proper sleep hygiene and safe driving. * The problem of recurrent insomnia is discussed. Avoidance of caffeine sources is strongly encouraged. Sleep hygiene issues are reviewed. The use of sedative hypnotics for temporary relief may be appropriate for a short 2-3 week course; we discussed the addictive nature of these drugs and counseled him on stress mangement. Thank you for allowing to participate in your patient's medical care.       Angie Awad M.D.  (electronically signed)  Diplomate in Sleep Medicine, [facility]

## 2017-03-29 NOTE — MR AVS SNAPSHOT
Visit Information Date & Time Provider Department Dept. Phone Encounter #  
 3/29/2017  9:00 AM Suzi Correia, [address] [place] [address] [id number] Follow-up Instructions Return in about 3 months (around 6/29/2017) for after oral appliance fitting. Follow-up and Disposition History Your Appointments 7/12/2017  9:00 AM  
Any with Suzi Correia MD  
[id number] [facility] ([address]) Appt Note: 3 month f/up post DDS visit; Dr. Gopi Gaviria [address] [place] [address]  
  
   
 [address] [facility] [address] [address] 7/19/2017  8:20 AM  
ESTABLISHED PATIENT with Surinder Chew MD  
[facility] ([facility]) Appt Note: 6 month follow up per Dr. Foley David [address]  [address] [place] [address]  
[address] [facility] [address] [address]  
  
    
 3/26/2018  9:00 AM  
Medicare Physical with Leda Wei MD  
[facility] Internal Medicine [address] [place] [address]) Appt Note: mwa  
 [address]  [address] [place] [address] [address]  
[address] [facility] [place] Upcoming Health Maintenance Date Due Hepatitis C Screening 1950 Pneumococcal 65+ Low/Medium Risk (2 of 2 - PCV13) 12/17/2014 GLAUCOMA SCREENING Q2Y 2/1/2018 MEDICARE YEARLY EXAM 3/22/2018 COLONOSCOPY 8/8/2022 DTaP/Tdap/Td series (2 - Td) 3/21/2027 Allergies as of 3/29/2017  Review Complete On: 3/29/2017 By: uSzi Correia MD  
 No Known Allergies Current Immunizations  Reviewed on 3/21/2017 Name Date Influenza Vaccine 10/7/2015 Influenza Vaccine (Quad) PF 9/21/2016 Influenza Vaccine Whole 12/5/2012 Pneumococcal Polysaccharide (PPSV-23) 12/17/2013 Td 1/1/2008 Tdap 3/21/2017 Zoster Vaccine, Live 6/12/2014 Not reviewed this visit You Were Diagnosed With   
  
 Codes Comments BRANDON (obstructive sleep apnea)    -  Primary ICD-10-CM: G47.33 
ICD-9-CM: 327.23 Hyposomnia, insomnia, or sleeplessness associated with conditioned arousal     ICD-10-CM: F51.03 
ICD-9-CM: 307.42 Vitals BP Pulse Height(growth percentile) Weight(growth percentile) SpO2 BMI  
 128/78 (!) 59 5' 7\" (1.702 m) 169 lb (76.7 kg) 96% 26.47 kg/m2 Smoking Status Former Smoker Vitals History BMI and BSA Data Body Mass Index Body Surface Area  
 26.47 kg/m 2 1.9 m 2 Preferred Pharmacy Pharmacy Name Phone 100 Yeni Reeves [facility] [phone] Your Updated Medication List  
  
   
This list is accurate as of: 3/29/17 10:02 AM.  Always use your most recent med list. amLODIPine 5 mg tablet Commonly known as:  Livermore Armando Take 1 Tab by mouth daily. aspirin delayed-release 81 mg tablet Take  by mouth daily. atorvastatin 20 mg tablet Commonly known as:  LIPITOR Take 1 Tab by mouth daily. flecainide 50 mg tablet Commonly known as:  TAMBOCOR Take 1 Tab by mouth two (2) times a day. sildenafil citrate 100 mg tablet Commonly known as:  VIAGRA Take 1 Tab by mouth as needed. zolpidem 5 mg tablet Commonly known as:  AMBIEN Take 1 Tab by mouth nightly as needed for Sleep. We Performed the Following REFERRAL TO DENTISTRY [REF18 Custom] Comments:  
 RE: BRANDON to create and adjust appliance for oral appliance therapy. Follow-up Instructions Return in about 3 months (around 6/29/2017) for after oral appliance fitting. Referral Information Referral ID Referred By Referred To  
  
 [id number] Rebekah BOUCHER DDS   
   [address] Phone: [phone] Fax: [phone] Visits Status Start Date End Date 1 New Request 3/29/17 3/29/18 If your referral has a status of pending review or denied, additional information will be sent to support the outcome of this decision. Patient Instructions [address] [place]. [address] [facility], [address] Tel.  [phone] Fax. 100 [facility] [address], [address] [facility] Tel.  [phone] Fax. [phone] [address] [place] [address] Lucille Ibarra  Tel.  [phone] Fax. [phone] Sleep Apnea: After Your Visit Your Care Instructions Sleep apnea occurs when you frequently stop breathing for 10 seconds or longer during sleep. It can be mild to severe, based on the number of times per hour that you stop breathing or have slowed breathing. Blocked or narrowed airways in your nose, mouth, or throat can cause sleep apnea. Your airway can become blocked when your throat muscles and tongue relax during sleep. Sleep apnea is common, occurring in 1 out of 20 individuals. Individuals having any of the following characteristics should be evaluated and treated right away due to high risk and detrimental consequences from untreated sleep apnea: 
1. Obesity 2. Congestive Heart failure 3. Atrial Fibrillation 4. Uncontrolled Hypertension 5. Type II Diabetes 6. Night-time Arrhythmias 7. Stroke 8. Pulmonary Hypertension 9. High-risk Driving Populations (pilots, truck drivers, etc.) 10. Patients Considering Weight-loss Surgery How do you know you have sleep apnea? You probably have sleep apnea if you answer 'yes' to 3 or more of the following questions: S - Have you been told that you Snore? T - Are you often Tired during the day? O - Has anyone Observed you stop breathing while sleeping? P- Do you have (or are being treated for) high blood Pressure? B - Are you obese (Body Mass Index > 35)? A - Is your Age 48years old or older? N - Is your Neck size greater than 16 inches? G - Are you male Gender? A sleep physician can prescribe a breathing device that prevents tissues in the throat from blocking your airway. Or your doctor may recommend using a dental device (oral breathing device) to help keep your airway open. In some cases, surgery may be needed to remove enlarged tissues in the throat. Follow-up care is a key part of your treatment and safety. Be sure to make and go to all appointments, and call your doctor if you are having problems. It's also a good idea to know your test results and keep a list of the medicines you take. How can you care for yourself at home? · Lose weight, if needed. It may reduce the number of times you stop breathing or have slowed breathing. · Go to bed at the same time every night. · Sleep on your side. It may stop mild apnea. If you tend to roll onto your back, sew a pocket in the back of your pajama top. Put a tennis ball into the pocket, and stitch the pocket shut. This will help keep you from sleeping on your back. · Avoid alcohol and medicines such as sleeping pills and sedatives before bed. · Do not smoke. Smoking can make sleep apnea worse. If you need help quitting, talk to your doctor about stop-smoking programs and medicines. These can increase your chances of quitting for good. · Prop up the head of your bed 4 to 6 inches by putting bricks under the legs of the bed. · Treat breathing problems, such as a stuffy nose, caused by a cold or allergies. · Use a continuous positive airway pressure (CPAP) breathing machine if lifestyle changes do not help your apnea and your doctor recommends it. The machine keeps your airway from closing when you sleep. · If CPAP does not help you, ask your doctor whether you should try other breathing machines. A bilevel positive airway pressure machine has two types of air pressureâone for breathing in and one for breathing out. Another device raises or lowers air pressure as needed while you breathe. · If your nose feels dry or bleeds when using one of these machines, talk with your doctor about increasing moisture in the air. A humidifier may help. · If your nose is runny or stuffy from using a breathing machine, talk with your doctor about using decongestants or a corticosteroid nasal spray. When should you call for help? Watch closely for changes in your health, and be sure to contact your doctor if: 
· You still have sleep apnea even though you have made lifestyle changes. · You are thinking of trying a device such as CPAP. · You are having problems using a CPAP or similar machine. Where can you learn more? Go to Fix8. Enter X881 in the search box to learn more about \"Sleep Apnea: After Your Visit. \"  
© 0581-1064 Healthwise, Incorporated. Care instructions adapted under license by Sydnee Connor (which disclaims liability or warranty for this information). This care instruction is for use with your licensed healthcare professional. If you have questions about a medical condition or this instruction, always ask your healthcare professional. Cammy Mathis any warranty or liability for your use of this information. PROPER SLEEP HYGIENE What to avoid · Do not have drinks with caffeine, such as coffee or black tea, for 8 hours before bed. · Do not smoke or use other types of tobacco near bedtime. Nicotine is a stimulant and can keep you awake. · Avoid drinking alcohol late in the evening, because it can cause you to wake in the middle of the night. · Do not eat a big meal close to bedtime. If you are hungry, eat a light snack. · Do not drink a lot of water close to bedtime, because the need to urinate may wake you up during the night. · Do not read or watch TV in bed. Use the bed only for sleeping and sexual activity. What to try · Go to bed at the same time every night, and wake up at the same time every morning. Do not take naps during the day. · Keep your bedroom quiet, dark, and cool. · Get regular exercise, but not within 3 to 4 hours of your bedtime. Slim Shillings · Sleep on a comfortable pillow and mattress. · If watching the clock makes you anxious, turn it facing away from you so you cannot see the time. · If you worry when you lie down, start a worry book. Well before bedtime, write down your worries, and then set the book and your concerns aside. · Try meditation or other relaxation techniques before you go to bed. · If you cannot fall asleep, get up and go to another room until you feel sleepy. Do something relaxing. Repeat your bedtime routine before you go to bed again. · Make your house quiet and calm about an hour before bedtime. Turn down the lights, turn off the TV, log off the computer, and turn down the volume on music. This can help you relax after a busy day. Drowsy Driving The [employer] cites drowsiness as a causing factor in more than 746,735 police reported crashes annually, resulting in 76,000 injuries and 1,500 deaths. Other surveys suggest 55% of people polled have driven while drowsy in the past year, 23% had fallen asleep but not crashed, 3% crashed, and 2% had and accident due to drowsy driving. Who is at risk? Young Drivers: One study of drowsy driving accidents states that 55% of the drivers were under 25 years. Of those, 75% were male. Shift Workers and Travelers: People who work overnight or travel across time zones frequently are at higher risk of experiencing Circadian Rhythm Disorders. They are trying to work and function when their body is programed to sleep. Sleep Deprived: Lack of sleep has a serious impact on your ability to pay attention or focus on a task.  Consistently getting less than the average of 8 hours your body needs creates partial or cumulative sleep deprivation. Untreated Sleep Disorders: Sleep Apnea, Narcolepsy, R.L.S., and other sleep disorders (untreated) prevent a person from getting enough restful sleep. This leads to excessive daytime sleepiness and increases the risk for drowsy driving accidents by up to 7 times. Medications / Alcohol: Even over the counter medications can cause drowsiness. Medications that impair a drivers attention should have a warning label. Alcohol naturally makes you sleepy and on its own can cause accidents. Combined with excessive drowsiness its effects are amplified. Signs of Drowsy Driving: * You don't remember driving the last few miles * You may drift out of your tessa * You are unable to focus and your thoughts wander * You may yawn more often than normal 
 * You have difficulty keeping your eyes open / nodding off * Missing traffic signs, speeding, or tailgating Prevention-  
Good sleep hygiene, lifestyle and behavioral choices have the most impact on drowsy driving. There is no substitute for sleep and the average person requires 8 hours nightly. If you find yourself driving drowsy, stop and sleep. Consider the sleep hygiene tips provided during your visit as well. Medication Refill Policy: Refills for all medications require 1 week advance notice. Please have your pharmacy fax a refill request. We are unable to fax, or call in \"controled substance\" medications and you will need to pick these prescriptions up from our office. Admeld Activation Thank you for requesting access to Admeld. Please follow the instructions below to securely access and download your online medical record. Admeld allows you to send messages to your doctor, view your test results, renew your prescriptions, schedule appointments, and more. How Do I Sign Up? 1. In your internet browser, go to [email or web address]. 2. Click on the First Time User? Click Here link in the Sign In box. You will see the New Member Sign Up page. 3. Enter your Presidiot Access Code exactly as it appears below. You will not need to use this code after youve completed the sign-up process. If you do not sign up before the expiration date, you must request a new code. MyChart Access Code: Activation code not generated Current Social Intelligence Status: Active (This is the date your MyChart access code will ) 4. Enter the last four digits of your Social Security Number (xxxx) and Date of Birth (mm/dd/yyyy) as indicated and click Submit. You will be taken to the next sign-up page. 5. Create a Presidiot ID. This will be your Presidiot login ID and cannot be changed, so think of one that is secure and easy to remember. 6. Create a Presidiot password. You can change your password at any time. 7. Enter your Password Reset Question and Answer. This can be used at a later time if you forget your password. 8. Enter your e-mail address. You will receive e-mail notification when new information is available in [address] 9. Click Sign Up. You can now view and download portions of your medical record. 10. Click the Download Summary menu link to download a portable copy of your medical information. Additional Information If you have questions, please call [phone]. Remember, Social Intelligence is NOT to be used for urgent needs. For medical emergencies, dial 911. [address] [place]. [address], [address] Tel.  [phone] Fax. 100 [facility] [address], [address] [place] [address] Tel.  [phone] Fax. [phone] [address] [employer] Luc[place] Ibarra [facility] Tel.  [phone] Fax. [phone] Insomnia: After Your Visit Your Care Instructions Insomnia is the inability to sleep well. It is a common problem for most people at some time.  Insomnia may make it hard for you to get to sleep, stay asleep, or sleep as long as you need to. This can make you tired and grouchy during the day. It can also make you forgetful, less effective at work, and unhappy. Insomnia can be caused by conditions such as depression or anxiety. Pain can also affect your ability to sleep. When these problems are solved, the insomnia usually clears up. But sometimes bad sleep habits can cause insomnia. If insomnia is affecting your work or your enjoyment of life, you can take steps to improve your sleep. Follow-up care is a key part of your treatment and safety. Be sure to make and go to all appointments, and call your doctor if you are having problems. It's also a good idea to know your test results and keep a list of the medicines you take. How can you care for yourself at home? What to avoid · Do not have drinks with caffeine, such as coffee or black tea, for 8 hours before bed. · Do not smoke or use other types of tobacco near bedtime. Nicotine is a stimulant and can keep you awake. · Avoid drinking alcohol late in the evening, because it can cause you to wake in the middle of the night. · Do not eat a big meal close to bedtime. If you are hungry, eat a light snack. · Do not drink a lot of water close to bedtime, because the need to urinate may wake you up during the night. · Do not read or watch TV in bed. Use the bed only for sleeping and sexual activity. What to try · Go to bed at the same time every night, and wake up at the same time every morning. Do not take naps during the day. · Keep your bedroom quiet, dark, and cool. · Get regular exercise, but not within 3 to 4 hours of your bedtime. [place] · Sleep on a comfortable pillow and mattress. · If watching the clock makes you anxious, turn it facing away from you so you cannot see the time. · If you worry when you lie down, start a worry book. Well before bedtime, write down your worries, and then set the book and your concerns aside. · Try meditation or other relaxation techniques before you go to bed. · If you cannot fall asleep, get up and go to another room until you feel sleepy. Do something relaxing. Repeat your bedtime routine before you go to bed again. · Make your house quiet and calm about an hour before bedtime. Turn down the lights, turn off the TV, log off the computer, and turn down the volume on music. This can help you relax after a busy day. When should you call for help? Watch closely for changes in your health, and be sure to contact your doctor if: 
· Your efforts to improve your sleep do not work. · Your insomnia gets worse. · You fall asleep during the day. Where can you learn more? Go to Tamar Energy.be Enter P513 in the search box to learn more about \"Insomnia: After Your Visit. \"  
© 6438-7336 Healthwise, Incorporated. Care instructions adapted under license by [place] [employer] (which disclaims liability or warranty for this information). This care instruction is for use with your licensed healthcare professional. If you have questions about a medical condition or this instruction, always ask your healthcare professional. [place] [address] any warranty or liability for your use of this information. Content Version: 4.0.73013; Last Revised: June 26, 2010 Drowsy Driving: The [employer] cites drowsiness as a causing factor in more than 615,489 police reported crashes annually, resulting in 76,000 injuries and 1,500 deaths. Other surveys suggest 55% of people polled have driven while drowsy in the past year, 23% had fallen asleep but not crashed, 3% crashed, and 2% had and accident due to drowsy driving. Who is at risk? Young Drivers: One study of drowsy driving accidents states that 55% of the drivers were under 25 years. Of those, 75% were male.   
Shift Workers and Travelers: People who work overnight or travel across time zones frequently are at higher risk of experiencing Circadian Rhythm Disorders. They are trying to work and function when their body is programed to sleep. Sleep Deprived: Lack of sleep has a serious impact on your ability to pay attention or focus on a task. Consistently getting less than the average of 8 hours your body needs creates partial or cumulative sleep deprivation. Untreated Sleep Disorders: Sleep Apnea, Narcolepsy, R.L.S., and other sleep disorders (untreated) prevent a person from getting enough restful sleep. This leads to excessive daytime sleepiness and increases the risk for drowsy driving accidents by up to 7 times. Medications / Alcohol: Even over the counter medications can cause drowsiness. Medications that impair a drivers attention should have a warning label. Alcohol naturally makes you sleepy and on its own can cause accidents. Combined with excessive drowsiness its effects are amplified. Signs of Drowsy Driving: * You don't remember driving the last few miles * You may drift out of your tessa * You are unable to focus and your thoughts wander * You may yawn more often than normal 
 * You have difficulty keeping your eyes open / nodding off * Missing traffic signs, speeding, or tailgating Prevention-  
Good sleep hygiene, lifestyle and behavioral choices have the most impact on drowsy driving. There is no substitute for sleep and the average person requires 8 hours nightly. If you find yourself driving drowsy, stop and sleep. Consider the sleep hygiene tips provided during your visit as well. Medication Refill Policy: Refills for all medications require 1 week advance notice. Please have your pharmacy fax a refill request. We are unable to fax, or call in \"controled substance\" medications and you will need to pick these prescriptions up from our office. AdBuddy Inc Activation Thank you for requesting access to AdBuddy Inc.  Please follow the instructions below to securely access and download your online medical record. Tap2print allows you to send messages to your doctor, view your test results, renew your prescriptions, schedule appointments, and more. How Do I Sign Up? 
 
11. In your internet browser, go to [email or web address]. 12. Click on the First Time User? Click Here link in the Sign In box. You will see the New Member Sign Up page. 15. Enter your Linkoveryt Access Code exactly as it appears below. You will not need to use this code after youve completed the sign-up process. If you do not sign up before the expiration date, you must request a new code. MyChart Access Code: Activation code not generated Current Tap2print Status: Active (This is the date your Tap2print access code will ) 14. Enter the last four digits of your Social Security Number (xxxx) and Date of Birth (mm/dd/yyyy) as indicated and click Submit. You will be taken to the next sign-up page. 15. Create a Linkoveryt ID. This will be your Tap2print login ID and cannot be changed, so think of one that is secure and easy to remember. 12. Create a Tap2print password. You can change your password at any time. 16. Enter your Password Reset Question and Answer. This can be used at a later time if you forget your password. 25. Enter your e-mail address. You will receive e-mail notification when new information is available in [address] 19. Click Sign Up. You can now view and download portions of your medical record. 20. Click the Download Summary menu link to download a portable copy of your medical information. Additional Information If you have questions, please call [phone]. Remember, Tap2print is NOT to be used for urgent needs. For medical emergencies, dial 911. Introducing [facility]! Dear Rosaura Golden: 
Thank you for requesting a Tap2print account.   Our records indicate that you already have an active Source MDx account. You can access your account anytime at [email or web address] Did you know that you can access your hospital and ER discharge instructions at any time in Source MDx? You can also review all of your test results from your hospital stay or ER visit. Additional Information If you have questions, please visit the Frequently Asked Questions section of the Source MDx website at [email or web address] Remember, Source MDx is NOT to be used for urgent needs. For medical emergencies, dial 911. Now available from your iPhone and Android! Please provide this summary of care documentation to your next provider. Your primary care clinician is listed as REJI BOOGIE. If you have any questions after today's visit, please call [phone].

## 2017-03-29 NOTE — PATIENT INSTRUCTIONS
[address] [place]. [place], [address]  Tel.  [phone]  Fax. 100 [facility] 60  [place], [address] [facility]  Tel.  [phone]  Fax. [phone] [address]  Tel.  [phone]  Fax. [phone]     Sleep Apnea: After Your Visit  Your Care Instructions  Sleep apnea occurs when you frequently stop breathing for 10 seconds or longer during sleep. It can be mild to severe, based on the number of times per hour that you stop breathing or have slowed breathing. Blocked or narrowed airways in your nose, mouth, or throat can cause sleep apnea. Your airway can become blocked when your throat muscles and tongue relax during sleep. Sleep apnea is common, occurring in 1 out of 20 individuals. Individuals having any of the following characteristics should be evaluated and treated right away due to high risk and detrimental consequences from untreated sleep apnea:  1. Obesity  2. Congestive Heart failure  3. Atrial Fibrillation  4. Uncontrolled Hypertension  5. Type II Diabetes  6. Night-time Arrhythmias  7. Stroke  8. Pulmonary Hypertension  9. High-risk Driving Populations (pilots, truck drivers, etc.)  10. Patients Considering Weight-loss Surgery    How do you know you have sleep apnea? You probably have sleep apnea if you answer 'yes' to 3 or more of the following questions:  S - Have you been told that you Snore? T - Are you often Tired during the day? O - Has anyone Observed you stop breathing while sleeping? P- Do you have (or are being treated for) high blood Pressure? B - Are you obese (Body Mass Index > 35)? A - Is your Age 48years old or older? N - Is your Neck size greater than 16 inches? G - Are you male Gender? A sleep physician can prescribe a breathing device that prevents tissues in the throat from blocking your airway.  Or your doctor may recommend using a dental device (oral breathing device) to help keep your airway open. In some cases, surgery may be needed to remove enlarged tissues in the throat. Follow-up care is a key part of your treatment and safety. Be sure to make and go to all appointments, and call your doctor if you are having problems. It's also a good idea to know your test results and keep a list of the medicines you take. How can you care for yourself at home? · Lose weight, if needed. It may reduce the number of times you stop breathing or have slowed breathing. · Go to bed at the same time every night. · Sleep on your side. It may stop mild apnea. If you tend to roll onto your back, sew a pocket in the back of your pajama top. Put a tennis ball into the pocket, and stitch the pocket shut. This will help keep you from sleeping on your back. · Avoid alcohol and medicines such as sleeping pills and sedatives before bed. · Do not smoke. Smoking can make sleep apnea worse. If you need help quitting, talk to your doctor about stop-smoking programs and medicines. These can increase your chances of quitting for good. · Prop up the head of your bed 4 to 6 inches by putting bricks under the legs of the bed. · Treat breathing problems, such as a stuffy nose, caused by a cold or allergies. · Use a continuous positive airway pressure (CPAP) breathing machine if lifestyle changes do not help your apnea and your doctor recommends it. The machine keeps your airway from closing when you sleep. · If CPAP does not help you, ask your doctor whether you should try other breathing machines. A bilevel positive airway pressure machine has two types of air pressureâone for breathing in and one for breathing out. Another device raises or lowers air pressure as needed while you breathe. · If your nose feels dry or bleeds when using one of these machines, talk with your doctor about increasing moisture in the air. A humidifier may help.   · If your nose is runny or stuffy from using a breathing machine, talk with your doctor about using decongestants or a corticosteroid nasal spray. When should you call for help? Watch closely for changes in your health, and be sure to contact your doctor if:  · You still have sleep apnea even though you have made lifestyle changes. · You are thinking of trying a device such as CPAP. · You are having problems using a CPAP or similar machine. Where can you learn more? Go to Aquantia. Enter X667 in the search box to learn more about \"Sleep Apnea: After Your Visit. \"   © 4196-4303 Healthwise, Incorporated. Care instructions adapted under license by [facility] (which disclaims liability or warranty for this information). This care instruction is for use with your licensed healthcare professional. If you have questions about a medical condition or this instruction, always ask your healthcare professional. Isaura Euceda any warranty or liability for your use of this information. PROPER SLEEP HYGIENE    What to avoid  · Do not have drinks with caffeine, such as coffee or black tea, for 8 hours before bed. · Do not smoke or use other types of tobacco near bedtime. Nicotine is a stimulant and can keep you awake. · Avoid drinking alcohol late in the evening, because it can cause you to wake in the middle of the night. · Do not eat a big meal close to bedtime. If you are hungry, eat a light snack. · Do not drink a lot of water close to bedtime, because the need to urinate may wake you up during the night. · Do not read or watch TV in bed. Use the bed only for sleeping and sexual activity. What to try  · Go to bed at the same time every night, and wake up at the same time every morning. Do not take naps during the day. · Keep your bedroom quiet, dark, and cool. · Get regular exercise, but not within 3 to 4 hours of your bedtime. .  · Sleep on a comfortable pillow and mattress.   · If watching the clock makes you anxious, turn it facing away from you so you cannot see the time. · If you worry when you lie down, start a worry book. Well before bedtime, write down your worries, and then set the book and your concerns aside. · Try meditation or other relaxation techniques before you go to bed. · If you cannot fall asleep, get up and go to another room until you feel sleepy. Do something relaxing. Repeat your bedtime routine before you go to bed again. · Make your house quiet and calm about an hour before bedtime. Turn down the lights, turn off the TV, log off the computer, and turn down the volume on music. This can help you relax after a busy day. Drowsy Driving  The [address] [place] [address] Road Traffic Safety Administration cites drowsiness as a causing factor in more than 277,399 police reported crashes annually, resulting in 76,000 injuries and 1,500 deaths. Other surveys suggest 55% of people polled have driven while drowsy in the past year, 23% had fallen asleep but not crashed, 3% crashed, and 2% had and accident due to drowsy driving. Who is at risk? Young Drivers: One study of drowsy driving accidents states that 55% of the drivers were under 25 years. Of those, 75% were male. Shift Workers and Travelers: People who work overnight or travel across time zones frequently are at higher risk of experiencing Circadian Rhythm Disorders. They are trying to work and function when their body is programed to sleep. Sleep Deprived: Lack of sleep has a serious impact on your ability to pay attention or focus on a task. Consistently getting less than the average of 8 hours your body needs creates partial or cumulative sleep deprivation. Untreated Sleep Disorders: Sleep Apnea, Narcolepsy, R.L.S., and other sleep disorders (untreated) prevent a person from getting enough restful sleep. This leads to excessive daytime sleepiness and increases the risk for drowsy driving accidents by up to 7 times.   Medications / Alcohol: Even over the counter medications can cause drowsiness. Medications that impair a drivers attention should have a warning label. Alcohol naturally makes you sleepy and on its own can cause accidents. Combined with excessive drowsiness its effects are amplified. Signs of Drowsy Driving:   * You don't remember driving the last few miles   * You may drift out of your tessa   * You are unable to focus and your thoughts wander   * You may yawn more often than normal   * You have difficulty keeping your eyes open / nodding off   * Missing traffic signs, speeding, or tailgating  Prevention-   Good sleep hygiene, lifestyle and behavioral choices have the most impact on drowsy driving. There is no substitute for sleep and the average person requires 8 hours nightly. If you find yourself driving drowsy, stop and sleep. Consider the sleep hygiene tips provided during your visit as well. Medication Refill Policy: Refills for all medications require 1 week advance notice. Please have your pharmacy fax a refill request. We are unable to fax, or call in \"controled substance\" medications and you will need to pick these prescriptions up from our office. RunMyProcess Activation    Thank you for requesting access to RunMyProcess. Please follow the instructions below to securely access and download your online medical record. RunMyProcess allows you to send messages to your doctor, view your test results, renew your prescriptions, schedule appointments, and more. How Do I Sign Up? 1. In your internet browser, go to [email or web address]. 2. Click on the First Time User? Click Here link in the Sign In box. You will see the New Member Sign Up page. 3. Enter your RunMyProcess Access Code exactly as it appears below. You will not need to use this code after youve completed the sign-up process. If you do not sign up before the expiration date, you must request a new code. RunMyProcess Access Code:  Activation code not generated  Current RunMyProcess Status: Active (This is the date your Battlefy access code will )    4. Enter the last four digits of your Social Security Number (xxxx) and Date of Birth (mm/dd/yyyy) as indicated and click Submit. You will be taken to the next sign-up page. 5. Create a Parental Healtht ID. This will be your Battlefy login ID and cannot be changed, so think of one that is secure and easy to remember. 6. Create a Battlefy password. You can change your password at any time. 7. Enter your Password Reset Question and Answer. This can be used at a later time if you forget your password. 8. Enter your e-mail address. You will receive e-mail notification when new information is available in [address] 9. Click Sign Up. You can now view and download portions of your medical record. 10. Click the Download Summary menu link to download a portable copy of your medical information. Additional Information    If you have questions, please call [phone]. Remember, Battlefy is NOT to be used for urgent needs. For medical emergencies, dial 338. [address] [place]. [place], [address]  Tel.  [phone]  Fax. 100 [facility] 60  [address]  Tel.  [phone]  Fax. [phone] [address] [place] [address]  Tel.  [phone]  Fax. [phone]       Insomnia: After Your Visit  Your Care Instructions  Insomnia is the inability to sleep well. It is a common problem for most people at some time. Insomnia may make it hard for you to get to sleep, stay asleep, or sleep as long as you need to. This can make you tired and grouchy during the day. It can also make you forgetful, less effective at work, and unhappy. Insomnia can be caused by conditions such as depression or anxiety. Pain can also affect your ability to sleep. When these problems are solved, the insomnia usually clears up. But sometimes bad sleep habits can cause insomnia.   If insomnia is affecting your work or your enjoyment of life, you can take steps to improve your sleep. Follow-up care is a key part of your treatment and safety. Be sure to make and go to all appointments, and call your doctor if you are having problems. It's also a good idea to know your test results and keep a list of the medicines you take. How can you care for yourself at home? What to avoid  · Do not have drinks with caffeine, such as coffee or black tea, for 8 hours before bed. · Do not smoke or use other types of tobacco near bedtime. Nicotine is a stimulant and can keep you awake. · Avoid drinking alcohol late in the evening, because it can cause you to wake in the middle of the night. · Do not eat a big meal close to bedtime. If you are hungry, eat a light snack. · Do not drink a lot of water close to bedtime, because the need to urinate may wake you up during the night. · Do not read or watch TV in bed. Use the bed only for sleeping and sexual activity. What to try  · Go to bed at the same time every night, and wake up at the same time every morning. Do not take naps during the day. · Keep your bedroom quiet, dark, and cool. · Get regular exercise, but not within 3 to 4 hours of your bedtime. .  · Sleep on a comfortable pillow and mattress. · If watching the clock makes you anxious, turn it facing away from you so you cannot see the time. · If you worry when you lie down, start a worry book. Well before bedtime, write down your worries, and then set the book and your concerns aside. · Try meditation or other relaxation techniques before you go to bed. · If you cannot fall asleep, get up and go to another room until you feel sleepy. Do something relaxing. Repeat your bedtime routine before you go to bed again. · Make your house quiet and calm about an hour before bedtime. Turn down the lights, turn off the TV, log off the computer, and turn down the volume on music. This can help you relax after a busy day. When should you call for help?   Watch closely for changes in your health, and be sure to contact your doctor if:  · Your efforts to improve your sleep do not work. · Your insomnia gets worse. · You fall asleep during the day. Where can you learn more? Go to ScanDigital.be  Enter P513 in the search box to learn more about \"Insomnia: After Your Visit. \"   © 0727-5322 Healthwise, Incorporated. Care instructions adapted under license by [employer] (which disclaims liability or warranty for this information). This care instruction is for use with your licensed healthcare professional. If you have questions about a medical condition or this instruction, always ask your healthcare professional. [place] [address] any warranty or liability for your use of this information. Content Version: 1.5.24294; Last Revised: June 26, 2010    Drowsy Driving: The [employer] cites drowsiness as a causing factor in more than 100,642 police reported crashes annually, resulting in 76,000 injuries and 1,500 deaths. Other surveys suggest 55% of people polled have driven while drowsy in the past year, 23% had fallen asleep but not crashed, 3% crashed, and 2% had and accident due to drowsy driving. Who is at risk? Young Drivers: One study of drowsy driving accidents states that 55% of the drivers were under 25 years. Of those, 75% were male. Shift Workers and Travelers: People who work overnight or travel across time zones frequently are at higher risk of experiencing Circadian Rhythm Disorders. They are trying to work and function when their body is programed to sleep. Sleep Deprived: Lack of sleep has a serious impact on your ability to pay attention or focus on a task. Consistently getting less than the average of 8 hours your body needs creates partial or cumulative sleep deprivation.    Untreated Sleep Disorders: Sleep Apnea, Narcolepsy, R.L.S., and other sleep disorders (untreated) prevent a person from getting enough restful sleep. This leads to excessive daytime sleepiness and increases the risk for drowsy driving accidents by up to 7 times. Medications / Alcohol: Even over the counter medications can cause drowsiness. Medications that impair a drivers attention should have a warning label. Alcohol naturally makes you sleepy and on its own can cause accidents. Combined with excessive drowsiness its effects are amplified. Signs of Drowsy Driving:   * You don't remember driving the last few miles   * You may drift out of your tessa   * You are unable to focus and your thoughts wander   * You may yawn more often than normal   * You have difficulty keeping your eyes open / nodding off   * Missing traffic signs, speeding, or tailgating  Prevention-   Good sleep hygiene, lifestyle and behavioral choices have the most impact on drowsy driving. There is no substitute for sleep and the average person requires 8 hours nightly. If you find yourself driving drowsy, stop and sleep. Consider the sleep hygiene tips provided during your visit as well. Medication Refill Policy: Refills for all medications require 1 week advance notice. Please have your pharmacy fax a refill request. We are unable to fax, or call in \"controled substance\" medications and you will need to pick these prescriptions up from our office. Dataupia Activation    Thank you for requesting access to Dataupia. Please follow the instructions below to securely access and download your online medical record. Dataupia allows you to send messages to your doctor, view your test results, renew your prescriptions, schedule appointments, and more. How Do I Sign Up?    11. In your internet browser, go to [email or web address]. 12. Click on the First Time User? Click Here link in the Sign In box. You will see the New Member Sign Up page. 15. Enter your Dataupia Access Code exactly as it appears below.  You will not need to use this code after youve completed the sign-up process. If you do not sign up before the expiration date, you must request a new code. RxCost Containment Access Code: Activation code not generated  Current RxCost Containment Status: Active (This is the date your RxCost Containment access code will )    14. Enter the last four digits of your Social Security Number (xxxx) and Date of Birth (mm/dd/yyyy) as indicated and click Submit. You will be taken to the next sign-up page. 15. Create a Paixie.nett ID. This will be your RxCost Containment login ID and cannot be changed, so think of one that is secure and easy to remember. 12. Create a RxCost Containment password. You can change your password at any time. 16. Enter your Password Reset Question and Answer. This can be used at a later time if you forget your password. 25. Enter your e-mail address. You will receive e-mail notification when new information is available in [address] 19. Click Sign Up. You can now view and download portions of your medical record. 20. Click the Download Summary menu link to download a portable copy of your medical information. Additional Information    If you have questions, please call [phone]. Remember, RxCost Containment is NOT to be used for urgent needs. For medical emergencies, dial 911.

## 2017-04-01 LAB
25(OH)D3+25(OH)D2 SERPL-MCNC: 16.2 NG/ML (ref 30–100)
ALBUMIN SERPL-MCNC: 4.5 G/DL (ref 3.6–4.8)
ALBUMIN/GLOB SERPL: 2.4 {RATIO} (ref 1.2–2.2)
ALP SERPL-CCNC: 102 IU/L (ref 39–117)
ALT SERPL-CCNC: 32 IU/L (ref 0–44)
APPEARANCE UR: ABNORMAL
AST SERPL-CCNC: 29 IU/L (ref 0–40)
BACTERIA #/AREA URNS HPF: ABNORMAL /[HPF]
BASOPHILS # BLD AUTO: 0 X10E3/UL (ref 0–0.2)
BASOPHILS NFR BLD AUTO: 1 %
BILIRUB SERPL-MCNC: 0.4 MG/DL (ref 0–1.2)
BILIRUB UR QL STRIP: NEGATIVE
BUN SERPL-MCNC: 11 MG/DL (ref 8–27)
BUN/CREAT SERPL: 12 (ref 10–22)
CALCIUM SERPL-MCNC: 9.5 MG/DL (ref 8.6–10.2)
CASTS URNS QL MICRO: ABNORMAL /LPF
CHLORIDE SERPL-SCNC: 103 MMOL/L (ref 96–106)
CHOLEST SERPL-MCNC: 161 MG/DL (ref 100–199)
CO2 SERPL-SCNC: 27 MMOL/L (ref 18–29)
COLOR UR: YELLOW
CREAT SERPL-MCNC: 0.92 MG/DL (ref 0.76–1.27)
EOSINOPHIL # BLD AUTO: 0.1 X10E3/UL (ref 0–0.4)
EOSINOPHIL NFR BLD AUTO: 3 %
EPI CELLS #/AREA URNS HPF: ABNORMAL /HPF
ERYTHROCYTE [DISTWIDTH] IN BLOOD BY AUTOMATED COUNT: 13.6 % (ref 12.3–15.4)
EST. AVERAGE GLUCOSE BLD GHB EST-MCNC: 114 MG/DL
GLOBULIN SER CALC-MCNC: 1.9 G/DL (ref 1.5–4.5)
GLUCOSE SERPL-MCNC: 107 MG/DL (ref 65–99)
GLUCOSE UR QL: NEGATIVE
HBA1C MFR BLD: 5.6 % (ref 4.8–5.6)
HCT VFR BLD AUTO: 47.7 % (ref 37.5–51)
HCV AB S/CO SERPL IA: <0.1 S/CO RATIO (ref 0–0.9)
HDLC SERPL-MCNC: 43 MG/DL
HGB BLD-MCNC: 16.2 G/DL (ref 12.6–17.7)
HGB UR QL STRIP: NEGATIVE
IMM GRANULOCYTES # BLD: 0 X10E3/UL (ref 0–0.1)
IMM GRANULOCYTES NFR BLD: 0 %
KETONES UR QL STRIP: NEGATIVE
LDLC SERPL CALC-MCNC: 75 MG/DL (ref 0–99)
LEUKOCYTE ESTERASE UR QL STRIP: ABNORMAL
LYMPHOCYTES # BLD AUTO: 0.9 X10E3/UL (ref 0.7–3.1)
LYMPHOCYTES NFR BLD AUTO: 17 %
MCH RBC QN AUTO: 31.6 PG (ref 26.6–33)
MCHC RBC AUTO-ENTMCNC: 34 G/DL (ref 31.5–35.7)
MCV RBC AUTO: 93 FL (ref 79–97)
MICRO URNS: ABNORMAL
MONOCYTES # BLD AUTO: 0.6 X10E3/UL (ref 0.1–0.9)
MONOCYTES NFR BLD AUTO: 12 %
MUCOUS THREADS URNS QL MICRO: PRESENT
NEUTROPHILS # BLD AUTO: 3.4 X10E3/UL (ref 1.4–7)
NEUTROPHILS NFR BLD AUTO: 67 %
NITRITE UR QL STRIP: NEGATIVE
PH UR STRIP: 7 [PH] (ref 5–7.5)
PLATELET # BLD AUTO: 213 X10E3/UL (ref 150–379)
POTASSIUM SERPL-SCNC: 4.9 MMOL/L (ref 3.5–5.2)
PROT SERPL-MCNC: 6.4 G/DL (ref 6–8.5)
PROT UR QL STRIP: ABNORMAL
PSA SERPL-MCNC: 3.2 NG/ML (ref 0–4)
RBC # BLD AUTO: 5.13 X10E6/UL (ref 4.14–5.8)
RBC #/AREA URNS HPF: ABNORMAL /HPF
SODIUM SERPL-SCNC: 143 MMOL/L (ref 134–144)
SP GR UR: 1.02 (ref 1–1.03)
TRIGL SERPL-MCNC: 214 MG/DL (ref 0–149)
TSH SERPL DL<=0.005 MIU/L-ACNC: 2.93 UIU/ML (ref 0.45–4.5)
UROBILINOGEN UR STRIP-MCNC: 0.2 MG/DL (ref 0.2–1)
VLDLC SERPL CALC-MCNC: 43 MG/DL (ref 5–40)
WBC # BLD AUTO: 5.1 X10E3/UL (ref 3.4–10.8)
WBC #/AREA URNS HPF: >30 /HPF

## 2017-04-03 DIAGNOSIS — N39.0 URINARY TRACT INFECTION WITHOUT HEMATURIA, SITE UNSPECIFIED: Primary | ICD-10-CM

## 2017-04-03 NOTE — PROGRESS NOTES
Advised pt his urinalysis shows he may have a UTI. Denies any symptoms at this time. MD recommends he return for urine culture. Will treat if positive. Lab slip at . MD will review rest of labs following return of urine culture. He will come in this afternoon.

## 2017-04-03 NOTE — PROGRESS NOTES
There may be a uti. If symptoms are present obtain culture and start treatment. If no symptoms, obtain culture and treat only if positive.      Will review rest of labs following return of urine culture

## 2017-04-04 LAB — BACTERIA UR CULT: NO GROWTH

## 2017-04-08 NOTE — PROGRESS NOTES
Call- Labs look great overall except 2 things. Of note, no uti  1- Vitamin d is very low- start 83563 units q week x 6 months, recheck level. , mail form   2- sugar is slightly high but average is normal. There is slight elevation of  blood sugar based on the fasting glucose measurement. This can be controlled / improved by staying active and watching the diet for concentrated sweets and excessive starchy carbohydrates.

## 2017-04-10 DIAGNOSIS — E78.00 HYPERCHOLESTEREMIA: ICD-10-CM

## 2017-04-10 DIAGNOSIS — E55.9 VITAMIN D DEFICIENCY: Primary | ICD-10-CM

## 2017-04-10 RX ORDER — ATORVASTATIN CALCIUM 20 MG/1
20 TABLET, FILM COATED ORAL DAILY
Qty: 90 TAB | Refills: 1 | Status: SHIPPED | COMMUNITY
Start: 2017-04-10 | End: 2017-06-23 | Stop reason: SDUPTHER

## 2017-04-10 RX ORDER — FLECAINIDE ACETATE 50 MG/1
50 TABLET ORAL 2 TIMES DAILY
Qty: 180 TAB | Refills: 1 | Status: SHIPPED | OUTPATIENT
Start: 2017-04-10 | End: 2017-06-23 | Stop reason: SDUPTHER

## 2017-04-10 RX ORDER — ERGOCALCIFEROL 1.25 MG/1
50000 CAPSULE ORAL
Qty: 12 CAP | Refills: 1 | Status: SHIPPED | OUTPATIENT
Start: 2017-04-10 | End: 2017-07-19 | Stop reason: ALTCHOICE

## 2017-04-10 RX ORDER — AMLODIPINE BESYLATE 5 MG/1
5 TABLET ORAL DAILY
Qty: 90 TAB | Refills: 1 | Status: SHIPPED | OUTPATIENT
Start: 2017-04-10 | End: 2017-06-23 | Stop reason: SDUPTHER

## 2017-04-10 NOTE — PROGRESS NOTES
Advised pt his Labs look great overall except 2 things:  1- Vitamin d is very low- start 50510 units q week x 6 months, recheck level. , mail form - sent to pharmacy and mailed lab slip. 2- sugar is slightly high but average is normal. There is slight elevation of  blood sugar based on the fasting glucose measurement. This can be controlled / improved by staying active and watching the diet for concentrated sweets and excessive starchy carbohydrates.   Of note, no uti

## 2017-04-10 NOTE — TELEPHONE ENCOUNTER
Request for norvasc and flecainide. Last office visit 1/18/17, next office visit 7/19/17. Refills per verbal order from Dr. Laurie Diana. Lab Results   Component Value Date/Time    Sodium 143 03/31/2017 08:41 AM    Potassium 4.9 03/31/2017 08:41 AM    Chloride 103 03/31/2017 08:41 AM    CO2 27 03/31/2017 08:41 AM    Anion gap 16 06/26/2011 06:00 PM    Glucose 107 03/31/2017 08:41 AM    BUN 11 03/31/2017 08:41 AM    Creatinine 0.92 03/31/2017 08:41 AM    BUN/Creatinine ratio 12 03/31/2017 08:41 AM    GFR est AA 98 03/31/2017 08:41 AM    GFR est non-AA 85 03/31/2017 08:41 AM    Calcium 9.5 03/31/2017 08:41 AM    Bilirubin, total 0.4 03/31/2017 08:41 AM    AST (SGOT) 29 03/31/2017 08:41 AM    Alk.  phosphatase 102 03/31/2017 08:41 AM    Protein, total 6.4 03/31/2017 08:41 AM    Albumin 4.5 03/31/2017 08:41 AM    Globulin 3.3 06/26/2011 06:00 PM    A-G Ratio 2.4 03/31/2017 08:41 AM    ALT (SGPT) 32 03/31/2017 08:41 AM

## 2017-04-10 NOTE — TELEPHONE ENCOUNTER
From: Arabella Rogel  To:  Nadia Diehl MD  Sent: 4/10/2017 1:41 PM EDT  Subject: Medication Renewal Request    Original authorizing provider: MD Cristofer Magallonjusten Hoover would like a refill of the following medications:  amLODIPine (NORVASC) 5 mg tablet Nadia Diehl MD]    Preferred pharmacy: [facility]    Comment:      Medication renewals requested in this message routed to other providers:  flecainide (TAMBOCOR) 50 mg tablet Adilene Bolaños NP]

## 2017-04-25 ENCOUNTER — OFFICE VISIT (OUTPATIENT)
Dept: INTERNAL MEDICINE CLINIC | Age: 67
End: 2017-04-25

## 2017-04-25 VITALS
HEIGHT: 67 IN | DIASTOLIC BLOOD PRESSURE: 86 MMHG | WEIGHT: 165 LBS | SYSTOLIC BLOOD PRESSURE: 128 MMHG | RESPIRATION RATE: 16 BRPM | HEART RATE: 60 BPM | TEMPERATURE: 97.6 F | BODY MASS INDEX: 25.9 KG/M2 | OXYGEN SATURATION: 96 %

## 2017-04-25 DIAGNOSIS — R35.0 URINARY FREQUENCY: Primary | ICD-10-CM

## 2017-04-25 LAB
BILIRUB UR QL STRIP: NEGATIVE
GLUCOSE UR-MCNC: NEGATIVE MG/DL
KETONES P FAST UR STRIP-MCNC: NEGATIVE MG/DL
PH UR STRIP: 6 [PH] (ref 4.6–8)
PROT UR QL STRIP: NEGATIVE MG/DL
SP GR UR STRIP: 1.01 (ref 1–1.03)
UA UROBILINOGEN AMB POC: NORMAL (ref 0.2–1)
URINALYSIS CLARITY POC: CLEAR
URINALYSIS COLOR POC: YELLOW
URINE BLOOD POC: NEGATIVE
URINE LEUKOCYTES POC: NORMAL
URINE NITRITES POC: NEGATIVE

## 2017-04-25 NOTE — PROGRESS NOTES
Austin Dodson is a 74 y.o. male who was seen in clinic today (4/25/2017). Assessment & Plan:  Adonay Adams was seen today for urinary frequency. Diagnoses and all orders for this visit:    Urinary frequency- this is a new problem, differential dx reviewed with the patient, unclear. [place] is going [place] of the [place] next week, which maybe some anxiety. Previous UA & UC reviewed. UA today only slightly abnormal.  Will repeat UC. No meds. Will get him into see urology to see if any further w/u needed or reassurance. Red flags were reviewed with the patient to RTC or notify me. -     AMB POC URINALYSIS DIP STICK AUTO W/O MICRO  -     CULTURE, URINE  -     REFERRAL TO UROLOGY         Follow-up Disposition:  Return if symptoms worsen or fail to improve.       ----------------------------------------------------------------------    Subjective:  Urinary Changes  Adonay Adams returns to clinic today to discuss frequency and some \"irritation\" for 4-5 days. This is a new problem. He reports stable nocturia. He denies dysuria, urgency, foul smelling urine, nausea, vomiting, hematuria, incontinence, suprapubic pressure, bilaterally flank pain. He reports symptoms are not changed. Patient has tried: increasing PO intake. He reports the following likely etiologies: nothing. He did have abnormal UA at his CPE last month. Not treated and UC was negative. He was asymptomatic. Prior to Admission medications    Medication Sig Start Date End Date Taking? Authorizing Provider   amLODIPine (NORVASC) 5 mg tablet Take 1 Tab by mouth daily. 4/10/17  Yes Percy Negrete MD   flecainide (TAMBOCOR) 50 mg tablet Take 1 Tab by mouth two (2) times a day. 4/10/17  Yes Woodward Hatchet, NP   atorvastatin (LIPITOR) 20 mg tablet Take 1 Tab by mouth daily. 4/10/17  Yes Ying Reddy MD   ergocalciferol (ERGOCALCIFEROL) 50,000 unit capsule Take 1 Cap by mouth every seven (7) days.  4/10/17  Yes Kemar Nascimento MD M[place]   sildenafil citrate (VIAGRA) 100 mg tablet Take 1 Tab by mouth as needed. 8/30/16  Yes Melonie Kinney MD   aspirin delayed-release 81 mg tablet Take  by mouth daily. Yes Historical Provider          No Known Allergies        ROS : per HPI       Objective:   Physical Exam   Cardiovascular: Regular rhythm and normal heart sounds. No murmur heard. Pulmonary/Chest: Effort normal and breath sounds normal. He has no wheezes. He has no rales. Abdominal: Soft. Bowel sounds are normal. He exhibits no mass. There is no hepatosplenomegaly. There is no tenderness. There is no CVA tenderness. Visit Vitals    /86    Pulse 60    Temp 97.6 °F (36.4 °C) (Oral)    Resp 16    Ht 5' 7\" (1.702 m)    Wt 165 lb (74.8 kg)    SpO2 96%    BMI 25.84 kg/m2         Disclaimer:  Advised him to call back or return to office if symptoms worsen/change/persist.  Discussed expected course/resolution/complications of diagnosis in detail with patient. Medication risks/benefits/costs/interactions/alternatives discussed with patient. He was given an after visit summary which includes diagnoses, current medications, & vitals. He expressed understanding with the diagnosis and plan.         Edwige Goldman MD

## 2017-04-26 LAB — BACTERIA UR CULT: NO GROWTH

## 2017-04-26 RX ORDER — DOXYCYCLINE 100 MG/1
100 CAPSULE ORAL 2 TIMES DAILY
Qty: 12 CAP | Refills: 0 | Status: SHIPPED | OUTPATIENT
Start: 2017-04-26 | End: 2017-04-29

## 2017-04-26 RX ORDER — CEFUROXIME AXETIL 250 MG/1
250 TABLET ORAL 2 TIMES DAILY
Qty: 20 TAB | Refills: 0 | Status: SHIPPED | OUTPATIENT
Start: 2017-04-26 | End: 2017-05-06

## 2017-06-23 DIAGNOSIS — E78.00 HYPERCHOLESTEREMIA: ICD-10-CM

## 2017-06-23 RX ORDER — FLECAINIDE ACETATE 50 MG/1
50 TABLET ORAL 2 TIMES DAILY
Qty: 180 TAB | Refills: 1 | Status: SHIPPED | OUTPATIENT
Start: 2017-06-23 | End: 2017-08-10 | Stop reason: SDUPTHER

## 2017-06-23 RX ORDER — AMLODIPINE BESYLATE 5 MG/1
5 TABLET ORAL DAILY
Qty: 90 TAB | Refills: 1 | Status: SHIPPED | OUTPATIENT
Start: 2017-06-23 | End: 2018-06-22 | Stop reason: SDUPTHER

## 2017-06-23 RX ORDER — ATORVASTATIN CALCIUM 20 MG/1
20 TABLET, FILM COATED ORAL DAILY
Qty: 90 TAB | Refills: 3 | Status: SHIPPED | COMMUNITY
Start: 2017-06-23 | End: 2017-10-15 | Stop reason: SDUPTHER

## 2017-06-23 NOTE — TELEPHONE ENCOUNTER
Request for norvasc 5mg every day and flecainide 50mg twice a day. Last office visit 1/18/17, next office visit 7/19/17. Refills per verbal order from Dr. Venus Maradiaga.

## 2017-06-23 NOTE — TELEPHONE ENCOUNTER
From: Logan Rogel  To:  Damion Pulliam MD  Sent: 6/23/2017 9:12 AM EDT  Subject: Medication Renewal Request    Original authorizing provider: Damion Pulliam MD    Jane Crane would like a refill of the following medications:  amLODIPine (NORVASC) 5 mg tablet Damion Pulliam MD]    Preferred pharmacy: [facility]    Comment:      Medication renewals requested in this message routed to other providers:  atorvastatin (LIPITOR) 20 mg tablet Molina Garcia MD]  flecainide (TAMBOCOR) 50 mg tablet Sheryl Chang NP]

## 2017-07-12 ENCOUNTER — OFFICE VISIT (OUTPATIENT)
Dept: SLEEP MEDICINE | Age: 67
End: 2017-07-12

## 2017-07-12 VITALS
BODY MASS INDEX: 26.57 KG/M2 | HEIGHT: 67 IN | HEART RATE: 63 BPM | WEIGHT: 169.3 LBS | SYSTOLIC BLOOD PRESSURE: 129 MMHG | DIASTOLIC BLOOD PRESSURE: 78 MMHG | OXYGEN SATURATION: 96 %

## 2017-07-12 DIAGNOSIS — E66.3 OVERWEIGHT (BMI 25.0-29.9): ICD-10-CM

## 2017-07-12 DIAGNOSIS — G47.33 OSA (OBSTRUCTIVE SLEEP APNEA): Primary | ICD-10-CM

## 2017-07-12 NOTE — PATIENT INSTRUCTIONS
[address] [facility] [place]. [place], [address]  Tel.  [phone]  Fax. 100 [facility] 60  [place], [address] [facility]  Tel.  [phone]  Fax. [phone] [address]  Tel.  [phone]  Fax. [phone]     Sleep Apnea: After Your Visit  Your Care Instructions  Sleep apnea occurs when you frequently stop breathing for 10 seconds or longer during sleep. It can be mild to severe, based on the number of times per hour that you stop breathing or have slowed breathing. Blocked or narrowed airways in your nose, mouth, or throat can cause sleep apnea. Your airway can become blocked when your throat muscles and tongue relax during sleep. Sleep apnea is common, occurring in 1 out of 20 individuals. Individuals having any of the following characteristics should be evaluated and treated right away due to high risk and detrimental consequences from untreated sleep apnea:  1. Obesity  2. Congestive Heart failure  3. Atrial Fibrillation  4. Uncontrolled Hypertension  5. Type II Diabetes  6. Night-time Arrhythmias  7. Stroke  8. Pulmonary Hypertension  9. High-risk Driving Populations (pilots, truck drivers, etc.)  10. Patients Considering Weight-loss Surgery    How do you know you have sleep apnea? You probably have sleep apnea if you answer 'yes' to 3 or more of the following questions:  S - Have you been told that you Snore? T - Are you often Tired during the day? O - Has anyone Observed you stop breathing while sleeping? P- Do you have (or are being treated for) high blood Pressure? B - Are you obese (Body Mass Index > 35)? A - Is your Age 48years old or older? N - Is your Neck size greater than 16 inches? G - Are you male Gender? A sleep physician can prescribe a breathing device that prevents tissues in the throat from blocking your airway.  Or your doctor may recommend using a dental device (oral breathing device) to help keep your airway open. In some cases, surgery may be needed to remove enlarged tissues in the throat. Follow-up care is a key part of your treatment and safety. Be sure to make and go to all appointments, and call your doctor if you are having problems. It's also a good idea to know your test results and keep a list of the medicines you take. How can you care for yourself at home? · Lose weight, if needed. It may reduce the number of times you stop breathing or have slowed breathing. · Go to bed at the same time every night. · Sleep on your side. It may stop mild apnea. If you tend to roll onto your back, sew a pocket in the back of your pajama top. Put a tennis ball into the pocket, and stitch the pocket shut. This will help keep you from sleeping on your back. · Avoid alcohol and medicines such as sleeping pills and sedatives before bed. · Do not smoke. Smoking can make sleep apnea worse. If you need help quitting, talk to your doctor about stop-smoking programs and medicines. These can increase your chances of quitting for good. · Prop up the head of your bed 4 to 6 inches by putting bricks under the legs of the bed. · Treat breathing problems, such as a stuffy nose, caused by a cold or allergies. · Use a continuous positive airway pressure (CPAP) breathing machine if lifestyle changes do not help your apnea and your doctor recommends it. The machine keeps your airway from closing when you sleep. · If CPAP does not help you, ask your doctor whether you should try other breathing machines. A bilevel positive airway pressure machine has two types of air pressureâone for breathing in and one for breathing out. Another device raises or lowers air pressure as needed while you breathe. · If your nose feels dry or bleeds when using one of these machines, talk with your doctor about increasing moisture in the air. A humidifier may help.   · If your nose is runny or stuffy from using a breathing machine, talk with your doctor about using decongestants or a corticosteroid nasal spray. When should you call for help? Watch closely for changes in your health, and be sure to contact your doctor if:  · You still have sleep apnea even though you have made lifestyle changes. · You are thinking of trying a device such as CPAP. · You are having problems using a CPAP or similar machine. Where can you learn more? Go to [employer]. Enter D539 in the search box to learn more about \"Sleep Apnea: After Your Visit. \"   © 9902-3631 Healthwise, Incorporated. Care instructions adapted under license by Anisa Andujar (which disclaims liability or warranty for this information). This care instruction is for use with your licensed healthcare professional. If you have questions about a medical condition or this instruction, always ask your healthcare professional. Vane Stoneen any warranty or liability for your use of this information. PROPER SLEEP HYGIENE    What to avoid  · Do not have drinks with caffeine, such as coffee or black tea, for 8 hours before bed. · Do not smoke or use other types of tobacco near bedtime. Nicotine is a stimulant and can keep you awake. · Avoid drinking alcohol late in the evening, because it can cause you to wake in the middle of the night. · Do not eat a big meal close to bedtime. If you are hungry, eat a light snack. · Do not drink a lot of water close to bedtime, because the need to urinate may wake you up during the night. · Do not read or watch TV in bed. Use the bed only for sleeping and sexual activity. What to try  · Go to bed at the same time every night, and wake up at the same time every morning. Do not take naps during the day. · Keep your bedroom quiet, dark, and cool. · Get regular exercise, but not within 3 to 4 hours of your bedtime. .  · Sleep on a comfortable pillow and mattress.   · If watching the clock makes you anxious, turn it facing away from you so you cannot see the time. · If you worry when you lie down, start a worry book. Well before bedtime, write down your worries, and then set the book and your concerns aside. · Try meditation or other relaxation techniques before you go to bed. · If you cannot fall asleep, get up and go to another room until you feel sleepy. Do something relaxing. Repeat your bedtime routine before you go to bed again. · Make your house quiet and calm about an hour before bedtime. Turn down the lights, turn off the TV, log off the computer, and turn down the volume on music. This can help you relax after a busy day. Drowsy Driving  The [address] [place] [address] Road Traffic Safety Administration cites drowsiness as a causing factor in more than 384,610 police reported crashes annually, resulting in 76,000 injuries and 1,500 deaths. Other surveys suggest 55% of people polled have driven while drowsy in the past year, 23% had fallen asleep but not crashed, 3% crashed, and 2% had and accident due to drowsy driving. Who is at risk? Young Drivers: One study of drowsy driving accidents states that 55% of the drivers were under 25 years. Of those, 75% were male. Shift Workers and Travelers: People who work overnight or travel across time zones frequently are at higher risk of experiencing Circadian Rhythm Disorders. They are trying to work and function when their body is programed to sleep. Sleep Deprived: Lack of sleep has a serious impact on your ability to pay attention or focus on a task. Consistently getting less than the average of 8 hours your body needs creates partial or cumulative sleep deprivation. Untreated Sleep Disorders: Sleep Apnea, Narcolepsy, R.L.S., and other sleep disorders (untreated) prevent a person from getting enough restful sleep. This leads to excessive daytime sleepiness and increases the risk for drowsy driving accidents by up to 7 times.   Medications / Alcohol: Even over the counter medications can cause drowsiness. Medications that impair a drivers attention should have a warning label. Alcohol naturally makes you sleepy and on its own can cause accidents. Combined with excessive drowsiness its effects are amplified. Signs of Drowsy Driving:   * You don't remember driving the last few miles   * You may drift out of your tessa   * You are unable to focus and your thoughts wander   * You may yawn more often than normal   * You have difficulty keeping your eyes open / nodding off   * Missing traffic signs, speeding, or tailgating  Prevention-   Good sleep hygiene, lifestyle and behavioral choices have the most impact on drowsy driving. There is no substitute for sleep and the average person requires 8 hours nightly. If you find yourself driving drowsy, stop and sleep. Consider the sleep hygiene tips provided during your visit as well. Medication Refill Policy: Refills for all medications require 1 week advance notice. Please have your pharmacy fax a refill request. We are unable to fax, or call in \"controled substance\" medications and you will need to pick these prescriptions up from our office. [employer] Activation    Thank you for requesting access to [employer]. Please follow the instructions below to securely access and download your online medical record. [employer] allows you to send messages to your doctor, view your test results, renew your prescriptions, schedule appointments, and more. How Do I Sign Up? 1. In your internet browser, go to [email or web address]. 2. Click on the First Time User? Click Here link in the Sign In box. You will see the New Member Sign Up page. 3. Enter your [employer] Access Code exactly as it appears below. You will not need to use this code after youve completed the sign-up process. If you do not sign up before the expiration date, you must request a new code. [employer] Access Code:  Activation code not generated  Current [employer] Status: Active (This is the date your Naiku access code will )    4. Enter the last four digits of your Social Security Number (xxxx) and Date of Birth (mm/dd/yyyy) as indicated and click Submit. You will be taken to the next sign-up page. 5. Create a Naiku ID. This will be your Naiku login ID and cannot be changed, so think of one that is secure and easy to remember. 6. Create a Naiku password. You can change your password at any time. 7. Enter your Password Reset Question and Answer. This can be used at a later time if you forget your password. 8. Enter your e-mail address. You will receive e-mail notification when new information is available in [address] 9. Click Sign Up. You can now view and download portions of your medical record. 10. Click the Download Summary menu link to download a portable copy of your medical information. Additional Information    If you have questions, please call [phone]. Remember, Naiku is NOT to be used for urgent needs. For medical emergencies, dial 911. Starting a Weight Loss Plan: After Your Visit  Your Care Instructions  If you are thinking about losing weight, it can be hard to know where to start. Your doctor can help you set up a weight loss plan that best meets your needs. You may want to take a class on nutrition or exercise, or join a weight loss support group. If you have questions about how to make changes to your eating or exercise habits, ask your doctor about seeing a registered dietitian or an exercise specialist.  It can be a big challenge to lose weight. But you do not have to make huge changes at once. Make small changes, and stick with them. When those changes become habit, add a few more changes. If you do not think you are ready to make changes right now, try to pick a date in the future. Make an appointment to see your doctor to discuss whether the time is right for you to start a plan.   Follow-up care is a key part of your treatment and safety. Be sure to make and go to all appointments, and call your doctor if you are having problems. It's also a good idea to know your test results and keep a list of the medicines you take. How can you care for yourself at home? · Set realistic goals. Many people expect to lose much more weight than is likely. A weight loss of 5% to 10% of your body weight may be enough to improve your health. · Get family and friends involved to provide support. Talk to them about why you are trying to lose weight, and ask them to help. They can help by participating in exercise and having meals with you, even if they may be eating something different. · Find what works best for you. If you do not have time or do not like to cook, a program that offers meal replacement bars or shakes may be better for you. Or if you like to prepare meals, finding a plan that includes daily menus and recipes may be best.  · Ask your doctor about other health professionals who can help you achieve your weight loss goals. ¨ A dietitian can help you make healthy changes in your diet. ¨ An exercise specialist or  can help you develop a safe and effective exercise program.  ¨ A counselor or psychiatrist can help you cope with issues such as depression, anxiety, or family problems that can make it hard to focus on weight loss. · Consider joining a support group for people who are trying to lose weight. Your doctor can suggest groups in your area. Where can you learn more? Go to [employer]  Enter U357 in the search box to learn more about \"Starting a Weight Loss Plan: After Your Visit. \"   © 4569-4553 Healthwise, Incorporated. Care instructions adapted under license by Gil Chapin (which disclaims liability or warranty for this information).  This care instruction is for use with your licensed healthcare professional. If you have questions about a medical condition or this instruction, always ask your healthcare professional. [place] [address] any warranty or liability for your use of this information.   Content Version: 7.5.38811; Last Revised: September 1, 2009

## 2017-07-12 NOTE — PROGRESS NOTES
[address] [facility]. [place], [facility]  Tel.  [phone]  Fax. 100 [facility] 60  [place], [address] [facility]  Tel.  [phone]  Fax. [phone] [address] [facility]  Tel.  [phone]  Fax. [phone]       S>Lionel Leach is a 69 y.o. male seen today to receive a home sleep testing unit (HST). · Patient was educated on proper hookup and operation of the HST. · Instruction forms and documentation were reviewed and signed. · The patient demonstrated good understanding of the HST. O>    Visit Vitals    /78    Pulse 63    Ht 5' 7\" (1.702 m)    Wt 169 lb 4.8 oz (76.8 kg)    SpO2 96%    BMI 26.52 kg/m2         A>  1. BRNADON (obstructive sleep apnea)    2. Overweight (BMI 25.0-29. 9)          P>  · General information regarding operations and maintenance of the device was provided. · He was provided information on sleep apnea including coresponding risk factors and the importance of proper treatment. · Follow-up appointment was made to return the HST. He will be contacted once the results have been reviewed. · He was asked to contact our office for any problems regarding his home sleep test study.

## 2017-07-12 NOTE — PROGRESS NOTES
[address] [facility]. [place], [address]  Tel.  [phone]  Fax. 100 [facility] 60  [place], [address] [facility]  Tel.  [phone]  Fax. [phone] [address]  Tel.  [phone]  Fax. [phone]     S>Lionel Mims is a 69 y.o. male seen for 3 month F/U after undergoing oral appliance fitting and adjustment. He reports no problems using the device. He is using the device nightly. The following problems are identified:    Drowsiness no Mouth/jaw pain no   Snoring some Forget to put on no   Device Comfortable yes Can't fall asleep no   Morning Headaches no Frequent awakenings no           He admits that his sleep has improved. No Known Allergies    He has a current medication list which includes the following prescription(s): amlodipine, atorvastatin, flecainide, ergocalciferol, sildenafil citrate, and aspirin delayed-release. Jean Carlos Keller He  has a past medical history of Calculus of kidney (2011); Depression; Hypercholesterolemia; Hyperlipidemia; Kidney stone; Mitral valve prolapse; and Polycystic liver disease. [place] Sleepiness Score: 3   and Modified F.O.S.Q. Score Total / 2: 19   which reflect improved sleep quality over therapy time. O>    Visit Vitals    /78    Pulse 63    Ht 5' 7\" (1.702 m)    Wt 169 lb 4.8 oz (76.8 kg)    SpO2 96%    BMI 26.52 kg/m2         General:   Alert, oriented, not in distress   Neck:   No JVD    Chest/Lungs:  symetrical lung expansion , no accessory muscle use    Extremities:  no obvious rashes , negative edema    Neuro:  No focal deficits ; No obvious tremor    Psych:  Normal affect ,  Normal countenance ;           A>  1. BRANDON (obstructive sleep apnea)    2. Overweight (BMI 25.0-29. 9)      AHI = 13 (2017). Using an Oral Appliance    Compliant:      yes    Therapeutic Response:  Positive    P>  * Home Sleep Monitoring was ordered to determine efficacy of treatment.   * Hewas asked to contact his dentist regarding issues with the appliance. * He was provided information on sleep apnea including coresponding risk factors and the importance of proper treatment. * Hewas likewise counseled on weight management and lateral sleeping posture (with the use of bed pillows or wedge) which may reduce sleep apnea events. Caution with hypnotics, alcohol, and sedating pain medications as these can worsen sleep apnea. * He was counseled on proper sleep hygiene and on safe driving. I have reviewed/discussed the above normal BMI with the patient. I have recommended the following interventions: dietary management education, guidance, and counseling . .    * Follow-up Disposition:  Return for call with results. Thank you for allowing us to participate in your patient's medical care.     Wayne Veliz M.D.  (electronically signed)  Diplomate in Sleep Medicine, [facility]

## 2017-07-13 ENCOUNTER — TELEPHONE (OUTPATIENT)
Dept: SLEEP MEDICINE | Age: 67
End: 2017-07-13

## 2017-07-13 NOTE — TELEPHONE ENCOUNTER
[facility]    Date of Study: 7/12/17    The following information was gathered from the patients study log:    · Lights off: 10:30P  · Estimated sleep onset: 10:50P    · Awakened a total of 13 (coughing) times  · The patient felt they slept 6 hours  · Patient took no sleep aid before starting the test  · Sleep quality was much worse compared to a usual nights sleep.     Further information provided: -

## 2017-07-13 NOTE — TELEPHONE ENCOUNTER
Initial Apnea/Hypopnea index was 15 /hr. He elected to proceed with Oral Appliance Therapy. Repeat HSAT on Oral Appliance Therapy shows AHI 1 /hr which reflects significantly improved sleep breathing condition with therapy. A/ BRANDON on Oral Appliance Therapy: significantly improved clinical response    P/ Continue OAT as prescribed.     Princess Noonan M.D.  (electronically signed)  Diplomate in Sleep Medicine, [facility]

## 2017-07-19 ENCOUNTER — OFFICE VISIT (OUTPATIENT)
Dept: CARDIOLOGY CLINIC | Age: 67
End: 2017-07-19

## 2017-07-19 VITALS
HEART RATE: 61 BPM | BODY MASS INDEX: 26.37 KG/M2 | DIASTOLIC BLOOD PRESSURE: 76 MMHG | SYSTOLIC BLOOD PRESSURE: 138 MMHG | WEIGHT: 168 LBS | HEIGHT: 67 IN

## 2017-07-19 DIAGNOSIS — I49.3 PVC (PREMATURE VENTRICULAR CONTRACTION): Primary | ICD-10-CM

## 2017-07-19 DIAGNOSIS — I10 ESSENTIAL HYPERTENSION: ICD-10-CM

## 2017-07-19 DIAGNOSIS — Z79.899 HIGH RISK MEDICATION USE: ICD-10-CM

## 2017-07-19 RX ORDER — TAMSULOSIN HYDROCHLORIDE 0.4 MG/1
0.4 CAPSULE ORAL 2 TIMES DAILY
Status: ON HOLD | COMMUNITY
End: 2022-10-28

## 2017-07-19 NOTE — PROGRESS NOTES
Cardiac Electrophysiology Office Note     Subjective: George Kimbrough is a 69 y.o. male patient who is seen for evaluation of PVCs   He was kindly referred by Dr. Danny Syed. Interim hx:   He is here today for follow up. He is on flecainide. He says he has been doing very well. He has been traveling to [facility] and [place] visiting his daughter who is studying abroad. He has been hiking and sailing. He exercises a couple days a week. He denies palpitations, irregular heart beat, SOB, chest pain or LE edema. No syncope. Past hx:  He reports he has PVCs for a \"long time\". For several years it has been transient and rarely bothered him. The past couple months he has noticed them more and thinks they have been occuring more frequently after spring break trip with 2 children in high school  He reports he feels the sensation of a skipped beat. Associated symptoms include \"queezy feeling\". He started the flecainide when he returned from his trip to [place]. So far he is tolerating the flecainide , no lightheadedness or dizziness. No chest pain or SOB. He had emailed us before his trip and reported that he thought the PVCs were actually bothering him more than he thought they did and that the PVCs were disturbing his sleep. He says that when he went on his trip he had no symptoms of PVCs (had not started flecainide yet). He started the flecainide when he got back and he thinks it is helping, but he sometimes has brief episodes of PVCs when he is exercising. Pmhx includes hyperlipidemia. Exercise stress nuclear test 8/2016 showed   Good exercise capacity. No ischemia on ECG, PVC, but there are frequent PACs  BP was very high 250/100 with exercise.  Perfusion images showed diaphragmatic attenuation artifacts in the inferior and inferolateral walls (base to distal)  GATED SPECT: LVEF 69% normal wall motion and thickening    Overall study: no ischemia or infarct  PACs but no PVC  Flecainide is effective in suppressing PVCs  BP is high and recommend BP medication: norvasc 5 mg every day to start    Holter before flecainide showed 33 thousands PVCs, mostly one morphology and appears to come from outflow tract but there is another PVC with superior axis and not coming from same place     Normal stress echo  and normal EF on recent echo        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 5 children (all in good health), . Half brother may have a pacemaker. Stress echo 12 with Dr Shyanne Wright  Normal resting electrocardiogram.  Above average functional capacity (>20%).   Appropriate heart rate response to exercise.  Normal resting blood pressure.  Appropriate blood pressure response to exercise. No chest pain.  No arrhythmias.  No ST changes.  Overall impression: Normal stress electrocardiogram.  Resting echo with normal regional and  global contractility.  Post exercise, uniformly enhanced myocardial contractility. Patient Active Problem List    Diagnosis Date Noted    Advanced directives, counseling/discussion 2016    PVC (premature ventricular contraction) 2016    Erectile dysfunction 2016    Encounter for long-term (current) use of other medications 2015    Unspecified vitamin D deficiency 2015    Attention deficit disorder without mention of hyperactivity 2014    Proteinuria 2014    Elevated blood pressure reading without diagnosis of hypertension 2014    Tinnitus of both ears 2013    Urticaria 2013    Calculus of kidney 2013    Insomnia, unspecified 2013    Mitral prolapse 2012    Hypercholesteremia 2012     Current Outpatient Prescriptions   Medication Sig Dispense Refill    tamsulosin (FLOMAX) 0.4 mg capsule Take 0.4 mg by mouth daily.       amLODIPine (NORVASC) 5 mg tablet Take 1 Tab by mouth daily. 90 Tab 1    atorvastatin (LIPITOR) 20 mg tablet Take 1 Tab by mouth daily. 90 Tab 3    flecainide (TAMBOCOR) 50 mg tablet Take 1 Tab by mouth two (2) times a day. 180 Tab 1    sildenafil citrate (VIAGRA) 100 mg tablet Take 1 Tab by mouth as needed. 6 Tab 11    aspirin delayed-release 81 mg tablet Take  by mouth daily. No Known Allergies  Past Medical History:   Diagnosis Date    Calculus of kidney 2011    Depression     Hypercholesterolemia     Hyperlipidemia     Kidney stone     Mitral valve prolapse     Polycystic liver disease      Past Surgical History:   Procedure Laterality Date    ENDOSCOPY, COLON, DIAGNOSTIC  2005    polyp- dr Pam Cabrera, 2012, due 16    HX HEENT      uvulectomy    HX VASECTOMY  2002     Family History   Problem Relation Age of Onset    Heart Disease Mother     Cancer Mother     Stroke Mother     Stroke Father     No Known Problems Sister     No Known Problems Brother      Social History   Substance Use Topics    Smoking status: Former Smoker     Packs/day: 1.00     Years: 12.00     Types: Cigarettes    Smokeless tobacco: Never Used    Alcohol use Yes      Comment: wine 1-2 glasse per day         Review of Systems:   Constitutional: Negative for fever, chills, weight loss, malaise/fatigue. HEENT: Negative for nosebleeds, vision changes. Respiratory: Negative for cough, hemoptysis, sputum production, and wheezing. Cardiovascular: Negative for chest pain, + rare occasional palpitations, no orthopnea, claudication, leg swelling, syncope, and PND. Gastrointestinal: Negative for nausea, vomiting, diarrhea, constipation, blood in stool and melena. Genitourinary: Negative for dysuria, and hematuria. Musculoskeletal: Negative for myalgias, arthralgia. Skin: Negative for rash. Heme: Does not bleed or bruise easily.    Neurological: Negative for speech change and focal weakness     Objective:     Visit Vitals    /76 (BP 1 Location: Left arm, BP Patient Position: Sitting)    Pulse 61    Ht 5' 7\" (1.702 m)    Wt 168 lb (76.2 kg)    BMI 26.31 kg/m2      Physical Exam:   Constitutional: well-developed and well-nourished. No distress. Head: Normocephalic and atraumatic. Eyes: Pupils are equal, round  Neck: supple. No JVD present. Cardiovascular: Normal rate, regular rhythm and normal heart sounds. Exam reveals no gallop and no friction rub. No murmur heard. Pulmonary/Chest: Effort normal and breath sounds normal. No wheezes. Abdominal: Soft, no tenderness. Musculoskeletal: no edema. Neurological: alert,oriented. Skin: Skin is warm and dry  Psychiatric: normal mood and affect. Behavior is normal. Judgment and thought content normal.      EKG: sinus rhythm HR 63 bpm, 1 st degree Av block        Assessment/Plan:       ICD-10-CM ICD-9-CM    1. PVC (premature ventricular contraction) I49.3 427.69 AMB POC EKG ROUTINE W/ 12 LEADS, INTER & REP   2. Essential hypertension I10 401.9    ECG today shows normal QRS, unchanged from previous ECG. LFTs WNL 3/2017 he is tolerating flecainide without side effects. He feels his sx are well controlled  He does not think his symptoms are severe enough for a EP study and I agree. BP controlled, goal SBP < 140. He is compensated on exam with no acute s/s of CHF. Follow-up Disposition: Not on File    Thank you for involving me in this patient's care and please call with further concerns or questions. Mary Dennison M.D.   Electrophysiology/Cardiology  [facility] [place]  [address] , [facility] [address]  [address]  [phone]

## 2017-07-19 NOTE — PROGRESS NOTES
Chief Complaint   Patient presents with    Irregular Heart Beat     PVC's    Follow-up     6 month follow up     Verified patient with two types of identifiers. Verified medications with the patient. Verified pharmacy with patient.

## 2017-07-19 NOTE — MR AVS SNAPSHOT
Visit Information Date & Time Provider Department Dept. Phone Encounter #  
 7/19/2017  8:20 AM Heather Huggins MD [facility] Sorin Barrios [phone] [id number] Your Appointments 1/18/2018  8:40 AM  
ESTABLISHED PATIENT with Heather Huggins MD  
[facility] ([address]) Appt Note: 6 mnth f/u  
 [address] [place]  
[address] [place] [address]  
  
    
 3/26/2018  9:00 AM  
Medicare Physical with Silke Damon MD  
[facility] [address]) Appt Note: mwa  
 [address] [facility] [address]  
[facility] [id number] [address] [place] Upcoming Health Maintenance Date Due INFLUENZA AGE 9 TO ADULT 8/1/2017 GLAUCOMA SCREENING Q2Y 2/1/2018 MEDICARE YEARLY EXAM 3/22/2018 COLONOSCOPY 8/8/2022 DTaP/Tdap/Td series (2 - Td) 3/21/2027 Allergies as of 7/19/2017  Review Complete On: 7/19/2017 By: Heather Huggins MD  
 No Known Allergies Current Immunizations  Reviewed on 4/25/2017 Name Date Influenza Vaccine 10/7/2015 Influenza Vaccine (Quad) PF 9/21/2016 Influenza Vaccine Whole 12/5/2012 Pneumococcal Conjugate (PCV-13) 4/7/2017 Pneumococcal Polysaccharide (PPSV-23) 12/17/2013 Td 1/1/2008 Tdap 3/21/2017 Zoster Vaccine, Live 6/12/2014 Not reviewed this visit You Were Diagnosed With   
  
 Codes Comments PVC (premature ventricular contraction)    -  Primary ICD-10-CM: I49.3 ICD-9-CM: 427.69 Essential hypertension     ICD-10-CM: I10 
ICD-9-CM: 401.9 High risk medication use     ICD-10-CM: Z79.899 ICD-9-CM: V58.69 Vitals BP Pulse Height(growth percentile) Weight(growth percentile) BMI Smoking Status 138/76 (BP 1 Location: Left arm, BP Patient Position: Sitting) 61 5' 7\" (1.702 m) 168 lb (76.2 kg) 26.31 kg/m2 Former Smoker Vitals History BMI and BSA Data Body Mass Index Body Surface Area  
 26.31 kg/m 2 1.9 m 2 Preferred Pharmacy Pharmacy Name Phone [address] [facility] [phone] Your Updated Medication List  
  
   
This list is accurate as of: 7/19/17  9:26 AM.  Always use your most recent med list. amLODIPine 5 mg tablet Commonly known as:  Lugo Inks Take 1 Tab by mouth daily. aspirin delayed-release 81 mg tablet Take  by mouth daily. atorvastatin 20 mg tablet Commonly known as:  LIPITOR Take 1 Tab by mouth daily. flecainide 50 mg tablet Commonly known as:  TAMBOCOR Take 1 Tab by mouth two (2) times a day. sildenafil citrate 100 mg tablet Commonly known as:  VIAGRA Take 1 Tab by mouth as needed. tamsulosin 0.4 mg capsule Commonly known as:  FLOMAX Take 0.4 mg by mouth daily. We Performed the Following AMB POC EKG ROUTINE W/ 12 LEADS, INTER & REP [86653 CPT(R)] Patient Instructions You will need to follow up in clinic with Dr. Alice Landa in 6 months. Introducing [facility]! Dear Demarco Mccloud: 
Thank you for requesting a [employer] account. Our records indicate that you already have an active AVIA account. You can access your account anytime at [email or web address] Did you know that you can access your hospital and ER discharge instructions at any time in AVIA? You can also review all of your test results from your hospital stay or ER visit. Additional Information If you have questions, please visit the Frequently Asked Questions section of the AVIA website at [email or web address] Remember, AVIA is NOT to be used for urgent needs. For medical emergencies, dial 911. Now available from your iPhone and Android! Please provide this summary of care documentation to your next provider. Your primary care clinician is listed as REJI BOOGIE. If you have any questions after today's visit, please call [phone].

## 2017-07-19 NOTE — PROGRESS NOTES
Cardiac Electrophysiology Office Note     Subjective: Veronica Moore is a 69 y.o. male patient who is seen for evaluation of PVCs   He was kindly referred by Dr. Westley Joy. Interim hx:   He is here today for follow up. He is on flecainide. He says he has been doing very well. He has been traveling to [facility] and [place] visiting his daughter who is studying abroad. He has been hiking and sailing. He exercises a couple days a week. He denies palpitations, irregular heart beat, SOB, chest pain or LE edema. No syncope. Past hx:  He reports he has PVCs for a \"long time\". For several years it has been transient and rarely bothered him. The past couple months he has noticed them more and thinks they have been occuring more frequently after spring break trip with 2 children in high school  He reports he feels the sensation of a skipped beat. Associated symptoms include \"queezy feeling\". He started the flecainide when he returned from his trip to [place]. So far he is tolerating the flecainide , no lightheadedness or dizziness. No chest pain or SOB. He had emailed us before his trip and reported that he thought the PVCs were actually bothering him more than he thought they did and that the PVCs were disturbing his sleep. He says that when he went on his trip he had no symptoms of PVCs (had not started flecainide yet). He started the flecainide when he got back and he thinks it is helping, but he sometimes has brief episodes of PVCs when he is exercising. Pmhx includes hyperlipidemia. Exercise stress nuclear test 8/2016 showed   Good exercise capacity. No ischemia on ECG, PVC, but there are frequent PACs  BP was very high 250/100 with exercise.  Perfusion images showed diaphragmatic attenuation artifacts in the inferior and inferolateral walls (base to distal)  GATED SPECT: LVEF 69% normal wall motion and thickening    Overall study: no ischemia or infarct  PACs but no PVC  Flecainide is effective in suppressing PVCs  BP is high and recommend BP medication: norvasc 5 mg every day to start    Holter before flecainide showed 33 thousands PVCs, mostly one morphology and appears to come from outflow tract but there is another PVC with superior axis and not coming from same place     Normal stress echo  and normal EF on recent echo        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 5 children (all in good health), . Half brother may have a pacemaker. Stress echo 12 with Dr Neto Roque  Normal resting electrocardiogram.  Above average functional capacity (>20%).   Appropriate heart rate response to exercise.  Normal resting blood pressure.  Appropriate blood pressure response to exercise. No chest pain.  No arrhythmias.  No ST changes.  Overall impression: Normal stress electrocardiogram.  Resting echo with normal regional and  global contractility.  Post exercise, uniformly enhanced myocardial contractility. Patient Active Problem List    Diagnosis Date Noted    Advanced directives, counseling/discussion 2016    PVC (premature ventricular contraction) 2016    Erectile dysfunction 2016    Encounter for long-term (current) use of other medications 2015    Unspecified vitamin D deficiency 2015    Attention deficit disorder without mention of hyperactivity 2014    Proteinuria 2014    Elevated blood pressure reading without diagnosis of hypertension 2014    Tinnitus of both ears 2013    Urticaria 2013    Calculus of kidney 2013    Insomnia, unspecified 2013    Mitral prolapse 2012    Hypercholesteremia 2012     Current Outpatient Prescriptions   Medication Sig Dispense Refill    tamsulosin (FLOMAX) 0.4 mg capsule Take 0.4 mg by mouth daily.       amLODIPine (NORVASC) 5 mg tablet Take 1 Tab by mouth daily. 90 Tab 1    atorvastatin (LIPITOR) 20 mg tablet Take 1 Tab by mouth daily. 90 Tab 3    flecainide (TAMBOCOR) 50 mg tablet Take 1 Tab by mouth two (2) times a day. 180 Tab 1    sildenafil citrate (VIAGRA) 100 mg tablet Take 1 Tab by mouth as needed. 6 Tab 11    aspirin delayed-release 81 mg tablet Take  by mouth daily. No Known Allergies  Past Medical History:   Diagnosis Date    Calculus of kidney 2011    Depression     Hypercholesterolemia     Hyperlipidemia     Kidney stone     Mitral valve prolapse     Polycystic liver disease      Past Surgical History:   Procedure Laterality Date    ENDOSCOPY, COLON, DIAGNOSTIC  2005    polyp- dr Jazmin Miller, 2012, due 16    HX HEENT      uvulectomy    HX VASECTOMY  2002     Family History   Problem Relation Age of Onset    Heart Disease Mother     Cancer Mother     Stroke Mother     Stroke Father     No Known Problems Sister     No Known Problems Brother      Social History   Substance Use Topics    Smoking status: Former Smoker     Packs/day: 1.00     Years: 12.00     Types: Cigarettes    Smokeless tobacco: Never Used    Alcohol use Yes      Comment: wine 1-2 glasse per day         Review of Systems:   Constitutional: Negative for fever, chills, weight loss, malaise/fatigue. HEENT: Negative for nosebleeds, vision changes. Respiratory: Negative for cough, hemoptysis, sputum production, and wheezing. Cardiovascular: Negative for chest pain, + rare occasional palpitations, no orthopnea, claudication, leg swelling, syncope, and PND. Gastrointestinal: Negative for nausea, vomiting, diarrhea, constipation, blood in stool and melena. Genitourinary: Negative for dysuria, and hematuria. Musculoskeletal: Negative for myalgias, arthralgia. Skin: Negative for rash. Heme: Does not bleed or bruise easily.    Neurological: Negative for speech change and focal weakness     Objective:     Visit Vitals    /76 (BP 1 Location: Left arm, BP Patient Position: Sitting)    Pulse 61    Ht 5' 7\" (1.702 m)    Wt 168 lb (76.2 kg)    BMI 26.31 kg/m2      Physical Exam:   Constitutional: well-developed and well-nourished. No distress. Head: Normocephalic and atraumatic. Eyes: Pupils are equal, round  Neck: supple. No JVD present. Cardiovascular: Normal rate, regular rhythm and normal heart sounds. Exam reveals no gallop and no friction rub. No murmur heard. Pulmonary/Chest: Effort normal and breath sounds normal. No wheezes. Abdominal: Soft, no tenderness. Musculoskeletal: no edema. Neurological: alert,oriented. Skin: Skin is warm and dry  Psychiatric: normal mood and affect. Behavior is normal. Judgment and thought content normal.      EKG: sinus rhythm HR 63 bpm, 1 st degree Av block        Assessment/Plan:       ICD-10-CM ICD-9-CM    1. PVC (premature ventricular contraction) I49.3 427.69 AMB POC EKG ROUTINE W/ 12 LEADS, INTER & REP   2. Essential hypertension I10 401.9    3. High risk medication use Z79.899 V58.69    ECG today shows normal QRS, unchanged from previous ECG. LFTs WNL 3/2017 he is tolerating flecainide without side effects. He feels his sx are well controlled, he has rare 1 minute or so of palpitation and so he wants to continue with flecainide   BP controlled         Follow-up Disposition:  Return in about 6 months (around 1/19/2018). Thank you for involving me in this patient's care and please call with further concerns or questions. Tulio Ross M.D.   Electrophysiology/Cardiology  [facility] [place]  [address], [address], [place] [address]  [address]                             [address] [place] [address] Road [address] [phone]

## 2017-08-10 ENCOUNTER — TELEPHONE (OUTPATIENT)
Dept: CARDIOLOGY CLINIC | Age: 67
End: 2017-08-10

## 2017-08-10 RX ORDER — FLECAINIDE ACETATE 100 MG/1
100 TABLET ORAL 2 TIMES DAILY
Qty: 180 TAB | Refills: 1 | Status: SHIPPED | OUTPATIENT
Start: 2017-08-10 | End: 2018-01-18 | Stop reason: ALTCHOICE

## 2017-08-10 NOTE — TELEPHONE ENCOUNTER
New script sent into pharmacy. My chart message sent to patient notifying him of change and appt made for 2 weeks with Dr Pao Love.

## 2017-08-10 NOTE — TELEPHONE ENCOUNTER
May increase to 100 mg bid flecainide and follow up 2 weeks to see if ECG shows longer QRS duration or av block.   Otherwise need to discuss ablation then

## 2017-08-10 NOTE — TELEPHONE ENCOUNTER
----- Message from Zoila Higginbotham, RN sent at 8/10/2017 12:34 PM EDT -----  Regarding: FW: Visit Follow-Up Question  Contact: [phone]      ----- Message -----     From: Douglas Avendano     Sent: 8/10/2017  12:15 PM       To: Lois Pugh  Subject: Visit James Dixon:    Over the last week or so, I have been noticing a recurrence of the PVCs that brought me in to see you initially. I have made an appointment to come in on September 28 at 3:00. Is there any reason for me to try to get in earlier?     Kathleen Rios

## 2017-08-22 ENCOUNTER — OFFICE VISIT (OUTPATIENT)
Dept: CARDIOLOGY CLINIC | Age: 67
End: 2017-08-22

## 2017-08-22 VITALS
RESPIRATION RATE: 18 BRPM | HEIGHT: 67 IN | SYSTOLIC BLOOD PRESSURE: 120 MMHG | OXYGEN SATURATION: 97 % | BODY MASS INDEX: 26.56 KG/M2 | DIASTOLIC BLOOD PRESSURE: 68 MMHG | WEIGHT: 169.2 LBS | HEART RATE: 56 BPM

## 2017-08-22 DIAGNOSIS — Z79.899 HIGH RISK MEDICATION USE: ICD-10-CM

## 2017-08-22 DIAGNOSIS — I10 ESSENTIAL HYPERTENSION: ICD-10-CM

## 2017-08-22 DIAGNOSIS — I49.3 PVC (PREMATURE VENTRICULAR CONTRACTION): Primary | ICD-10-CM

## 2017-08-22 NOTE — MR AVS SNAPSHOT
Visit Information Date & Time Provider Department Dept. Phone Encounter #  
 8/22/2017  4:00 PM Jose Miguel Quevedo MD [facility] Edwin Chiang [phone] [id number] Your Appointments 1/18/2018  8:40 AM  
ESTABLISHED PATIENT with Jose Miguel Quevedo MD  
[facility] ([address]) Appt Note: 6 mnth f/u  
 [address]  
[address] [place] [id number]  
  
    
 3/26/2018  9:00 AM  
Medicare Physical with Jeyson Shafer MD  
[facility] Internal Medicine [address]) Appt Note: mwa  
 [address] [facility] [address]  
[facility] [address] [place] [address] [address] Upcoming Health Maintenance Date Due INFLUENZA AGE 9 TO ADULT 8/1/2017 GLAUCOMA SCREENING Q2Y 2/1/2018 MEDICARE YEARLY EXAM 3/22/2018 COLONOSCOPY 8/8/2022 DTaP/Tdap/Td series (2 - Td) 3/21/2027 Allergies as of 8/22/2017  Review Complete On: 8/22/2017 By: Jose Miguel Quevedo MD  
 No Known Allergies Current Immunizations  Reviewed on 4/25/2017 Name Date Influenza Vaccine 10/7/2015 Influenza Vaccine (Quad) PF 9/21/2016 Influenza Vaccine Whole 12/5/2012 Pneumococcal Conjugate (PCV-13) 4/7/2017 Pneumococcal Polysaccharide (PPSV-23) 12/17/2013 Td 1/1/2008 Tdap 3/21/2017 Zoster Vaccine, Live 6/12/2014 Not reviewed this visit You Were Diagnosed With   
  
 Codes Comments PVC (premature ventricular contraction)    -  Primary ICD-10-CM: I49.3 ICD-9-CM: 427.69 Vitals BP Pulse Resp Height(growth percentile) Weight(growth percentile) SpO2  
 120/68 (BP 1 Location: Left arm) (!) 56 18 5' 7\" (1.702 m) 169 lb 3.2 oz (76.7 kg) 97% BMI Smoking Status 26.5 kg/m2 Former Smoker Vitals History BMI and BSA Data  Body Mass Index Body Surface Area  
 26.5 kg/m 2 1.9 m 2  
  
  
 Preferred Pharmacy Pharmacy Name Phone [address] [facility] [phone] Your Updated Medication List  
  
   
This list is accurate as of: 8/22/17  4:42 PM.  Always use your most recent med list. amLODIPine 5 mg tablet Commonly known as:  Alfie Lute Take 1 Tab by mouth daily. aspirin delayed-release 81 mg tablet Take  by mouth daily. atorvastatin 20 mg tablet Commonly known as:  LIPITOR Take 1 Tab by mouth daily. flecainide 100 mg tablet Commonly known as:  TAMBOCOR Take 1 Tab by mouth two (2) times a day. sildenafil citrate 100 mg tablet Commonly known as:  VIAGRA Take 1 Tab by mouth as needed. tamsulosin 0.4 mg capsule Commonly known as:  FLOMAX Take 0.4 mg by mouth daily. We Performed the Following AMB POC EKG ROUTINE W/ 12 LEADS, INTER & REP [97328 CPT(R)] Introducing [facility]! Dear Mello Romero: 
Thank you for requesting a "[employer] account. Our records indicate that you already have an active "[employer] account. You can access your account anytime at [email or web address] Did you know that you can access your hospital and ER discharge instructions at any time in "[employer]? You can also review all of your test results from your hospital stay or ER visit. Additional Information If you have questions, please visit the Frequently Asked Questions section of the "[employer] website at [email or web address] Remember, "[employer] is NOT to be used for urgent needs. For medical emergencies, dial 911. Now available from your iPhone and Android! Please provide this summary of care documentation to your next provider. Your primary care clinician is listed as REJI BOOGIE. If you have any questions after today's visit, please call [phone].

## 2017-08-22 NOTE — PROGRESS NOTES
Cardiac Electrophysiology Office Note     Subjective: Chao Jimenez is a 69 y.o. male patient who is seen for evaluation of PVCs   He was kindly referred by Dr. Rhys Arenas. Interim hx:   He is here today for follow up because he was having worsening palpitations/PVC, he was instructed to increase his flecainide, but he did not increase the flecainide. He was recently seen 7/19/17 and was doing well. He has been traveling to [facility] and [facility] [place] visiting his daughter who is studying abroad. He has been hiking and sailing. He exercises a couple days a week. Past hx:  He reports he has PVCs for a \"long time\". For several years it has been transient and rarely bothered him. The past couple months he has noticed them more and thinks they have been occuring more frequently after spring break trip with 2 children in high school  He reports he feels the sensation of a skipped beat. Associated symptoms include \"queezy feeling\". He started the flecainide when he returned from his trip to [place]. So far he is tolerating the flecainide , no lightheadedness or dizziness. No chest pain or SOB. He had emailed us before his trip and reported that he thought the PVCs were actually bothering him more than he thought they did and that the PVCs were disturbing his sleep. He says that when he went on his trip he had no symptoms of PVCs (had not started flecainide yet). He started the flecainide when he got back and he thinks it is helping, but he sometimes has brief episodes of PVCs when he is exercising. Pmhx includes hyperlipidemia. Exercise stress nuclear test 8/2016 showed   Good exercise capacity. No ischemia on ECG, PVC, but there are frequent PACs  BP was very high 250/100 with exercise.  Perfusion images showed diaphragmatic attenuation artifacts in the inferior and inferolateral walls (base to distal)  GATED SPECT: LVEF 69% normal wall motion and thickening    Overall study: no ischemia or infarct  PACs but no PVC  Flecainide is effective in suppressing PVCs  BP is high and recommend BP medication: norvasc 5 mg every day to start    Holter before flecainide showed 33 thousands PVCs, mostly one morphology and appears to come from outflow tract but there is another PVC with superior axis and not coming from same place     Normal stress echo  and normal EF on recent echo        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 5 children (all in good health), . Half brother may have a pacemaker. Stress echo 12 with Dr Veronica Kim  Normal resting electrocardiogram.  Above average functional capacity (>20%).   Appropriate heart rate response to exercise.  Normal resting blood pressure.  Appropriate blood pressure response to exercise. No chest pain.  No arrhythmias.  No ST changes.  Overall impression: Normal stress electrocardiogram.  Resting echo with normal regional and  global contractility.  Post exercise, uniformly enhanced myocardial contractility.       Patient Active Problem List    Diagnosis Date Noted    Advanced directives, counseling/discussion 2016    PVC (premature ventricular contraction) 2016    Erectile dysfunction 2016    Encounter for long-term (current) use of other medications 2015    Unspecified vitamin D deficiency 2015    Attention deficit disorder without mention of hyperactivity 2014    Proteinuria 2014    Elevated blood pressure reading without diagnosis of hypertension 2014    Tinnitus of both ears 2013    Urticaria 2013    Calculus of kidney 2013    Insomnia, unspecified 2013    Mitral prolapse 2012    Hypercholesteremia 2012     Current Outpatient Prescriptions   Medication Sig Dispense Refill    flecainide (TAMBOCOR) 100 mg tablet Take 1 Tab by mouth two (2) times a day. 180 Tab 1    tamsulosin (FLOMAX) 0.4 mg capsule Take 0.4 mg by mouth daily.  amLODIPine (NORVASC) 5 mg tablet Take 1 Tab by mouth daily. 90 Tab 1    atorvastatin (LIPITOR) 20 mg tablet Take 1 Tab by mouth daily. 90 Tab 3    sildenafil citrate (VIAGRA) 100 mg tablet Take 1 Tab by mouth as needed. 6 Tab 11    aspirin delayed-release 81 mg tablet Take  by mouth daily. No Known Allergies  Past Medical History:   Diagnosis Date    Calculus of kidney 2011    Depression     Hypercholesterolemia     Hyperlipidemia     Kidney stone     Mitral valve prolapse     Polycystic liver disease      Past Surgical History:   Procedure Laterality Date    ENDOSCOPY, COLON, DIAGNOSTIC  2005    polyp- dr Power Sylvester, 2012, due 16    HX HEENT      uvulectomy    HX VASECTOMY  2002     Family History   Problem Relation Age of Onset    Heart Disease Mother     Cancer Mother     Stroke Mother     Stroke Father     No Known Problems Sister     No Known Problems Brother      Social History   Substance Use Topics    Smoking status: Former Smoker     Packs/day: 1.00     Years: 12.00     Types: Cigarettes    Smokeless tobacco: Never Used    Alcohol use Yes      Comment: wine 1-2 glasse per day         Review of Systems:   Constitutional: Negative for fever, chills, weight loss, malaise/fatigue. HEENT: Negative for nosebleeds, vision changes. Respiratory: Negative for cough, hemoptysis, sputum production, and wheezing. Cardiovascular: Negative for chest pain, + episodic palpitations, no orthopnea, claudication, leg swelling, syncope, and PND. Gastrointestinal: Negative for nausea, vomiting, diarrhea, constipation, blood in stool and melena. Genitourinary: Negative for dysuria, and hematuria. Musculoskeletal: Negative for myalgias, arthralgia. Skin: Negative for rash. Heme: Does not bleed or bruise easily.    Neurological: Negative for speech change and focal weakness     Objective:     Visit Vitals    /68 (BP 1 Location: Left arm)    Pulse 60    Resp 18    Ht 5' 7\" (1.702 m)    Wt 169 lb 3.2 oz (76.7 kg)    SpO2 97%    BMI 26.5 kg/m2      Physical Exam:   Constitutional: well-developed and well-nourished. No distress. Head: Normocephalic and atraumatic. Eyes: Pupils are equal, round  Neck: supple. No JVD present. Cardiovascular: Normal rate, regular rhythm and normal heart sounds. Exam reveals no gallop and no friction rub. No murmur heard. Pulmonary/Chest: Effort normal and breath sounds normal. No wheezes. Abdominal: Soft, no tenderness. Musculoskeletal: no edema. Neurological: alert,oriented. Skin: Skin is warm and dry  Psychiatric: normal mood and affect. Behavior is normal. Judgment and thought content normal.      EKG: sinus bradycardia HR 51 bpm, 1 st degree Av block 252 msec        Assessment/Plan:       ICD-10-CM ICD-9-CM    1. PVC (premature ventricular contraction) I49.3 427.69 AMB POC EKG ROUTINE W/ 12 LEADS, INTER & REP   Continue Flecainide 50 mg BID, ECG today shows sinus bradycardia, normal QRS duration. If he has another episode of PVCs we can increase the the flecainide to 100 mg BID. Briefly reviewed PVC ablation if he is unable to tolerate or fails medications. BP controlled         Follow-up Disposition: Not on File    Thank you for involving me in this patient's care and please call with further concerns or questions. Vishal Espino M.D.   Electrophysiology/Cardiology  [facility] [place]  [address] , [address]  [address]  [phone]

## 2017-09-11 ENCOUNTER — OFFICE VISIT (OUTPATIENT)
Dept: INTERNAL MEDICINE CLINIC | Age: 67
End: 2017-09-11

## 2017-09-11 VITALS
RESPIRATION RATE: 18 BRPM | OXYGEN SATURATION: 95 % | TEMPERATURE: 99.3 F | HEIGHT: 67 IN | DIASTOLIC BLOOD PRESSURE: 74 MMHG | HEART RATE: 70 BPM | SYSTOLIC BLOOD PRESSURE: 132 MMHG | WEIGHT: 168.2 LBS | BODY MASS INDEX: 26.4 KG/M2

## 2017-09-11 DIAGNOSIS — J02.9 SORE THROAT: ICD-10-CM

## 2017-09-11 DIAGNOSIS — R50.9 LOW GRADE FEVER: Primary | ICD-10-CM

## 2017-09-11 LAB
S PYO AG THROAT QL: NEGATIVE
VALID INTERNAL CONTROL?: YES

## 2017-09-11 NOTE — PROGRESS NOTES
Shelly Thompson is a 69 y.o. male who was seen in clinic today (9/11/2017). Assessment & Plan:  Diagnoses and all orders for this visit:    1. Low grade fever- new dx, differential diagnosis reviewed with the patient, no obvious etiology at this time, do favor infectious (likely viral) more than autoimmune. Will schedule Tylenol for the next 2-3 days. If no changes by that time will check labs. Reviewed most likely something will show up in the next few days. Red flags reviewed and discussed with him to notify me. 2. Sore throat  -     AMB POC RAPID STREP A  ---> NEGATIVE          Follow-up Disposition:  Return if symptoms worsen or fail to improve.       ----------------------------------------------------------------------    Subjective: Nona Myles was seen today for Headache; Generalized Body Aches; and Sore Throat    1 week ago he cleaned his BRANDON sleep appliance w/ H2O2. He work up the next morning w/ his mouth feeling \"full\". He has since then had R sided head tenderness, mouth pain, abnormal taste sensation. He also reports aching sensation (R side of face, ear). Bilateral face is sensitive to light touch, initially R top of the head). Bilateral pressure in his sinus, worse w/ bending over. Sore throat on/off. Hiccups developed 90 minutes ago. He reports low grade temperatures:  degree for the next few days. No arthralgias, no rash, rhinorrhea, post nasal drip, chest pain/pressure, or coughing.         ----------------------------------------------------------------------      Prior to Admission medications    Medication Sig Start Date End Date Taking? Authorizing Provider   flecainide (TAMBOCOR) 100 mg tablet Take 1 Tab by mouth two (2) times a day. 8/10/17  Yes Cole Mccracken MD   tamsulosin (FLOMAX) 0.4 mg capsule Take 0.4 mg by mouth daily. Yes Historical Provider   amLODIPine (NORVASC) 5 mg tablet Take 1 Tab by mouth daily.  6/23/17  Yes Cole Mccracken MD atorvastatin (LIPITOR) 20 mg tablet Take 1 Tab by mouth daily. 6/23/17  Yes Josy Robbins MD   aspirin delayed-release 81 mg tablet Take  by mouth daily. Yes Historical Provider   sildenafil citrate (VIAGRA) 100 mg tablet Take 1 Tab by mouth as needed. 8/30/16   Josy Robbins MD          No Known Allergies        ROS - per HPI         Objective:   Physical Exam   Constitutional: No distress. HENT:   Right Ear: Tympanic membrane is not erythematous and not bulging. A middle ear effusion (minimal) is present. Left Ear: Tympanic membrane is not erythematous and not bulging. No middle ear effusion. Nose: No mucosal edema or rhinorrhea. Right sinus exhibits no maxillary sinus tenderness and no frontal sinus tenderness. Left sinus exhibits no maxillary sinus tenderness and no frontal sinus tenderness. Mouth/Throat: Uvula is midline and mucous membranes are normal. No oropharyngeal exudate or posterior oropharyngeal erythema. Bilateral sides of the tongue w/ increase in erythema   Eyes: Conjunctivae are normal. No scleral icterus. Neck: Neck supple. Cardiovascular: Regular rhythm and normal heart sounds. No murmur heard. Pulmonary/Chest: Effort normal and breath sounds normal. He has no wheezes. He has no rales. Lymphadenopathy:     He has no cervical adenopathy. Skin: No rash noted. Visit Vitals    /74 (BP 1 Location: Right arm, BP Patient Position: Sitting)    Pulse 70    Temp 99.3 °F (37.4 °C) (Oral)    Resp 18    Ht 5' 7\" (1.702 m)    Wt 168 lb 3.2 oz (76.3 kg)    SpO2 95%    BMI 26.34 kg/m2         Disclaimer:  Advised him to call back or return to office if symptoms worsen/change/persist.  Discussed expected course/resolution/complications of diagnosis in detail with patient. Medication risks/benefits/costs/interactions/alternatives discussed with patient. He was given an after visit summary which includes diagnoses, current medications, & vitals.   He expressed understanding with the diagnosis and plan.         Devon Morejon MD

## 2017-09-11 NOTE — PROGRESS NOTES
Alysha Pastor is a 69 y.o. male  Chief Complaint   Patient presents with    Headache    Generalized Body Aches    Sore Throat     1. Have you been to the ER, urgent care clinic since your last visit? Hospitalized since your last visit? No    2. Have you seen or consulted any other health care providers outside of the [address] [place] [address] since your last visit? Include any pap smears or colon screening.   No

## 2017-09-13 ENCOUNTER — HOSPITAL ENCOUNTER (OUTPATIENT)
Dept: GENERAL RADIOLOGY | Age: 67
Discharge: HOME | End: 2017-09-13
Payer: COMMERCIAL

## 2017-09-13 DIAGNOSIS — R06.6 INTRACTABLE HICCUPS: Primary | ICD-10-CM

## 2017-09-13 DIAGNOSIS — R06.6 INTRACTABLE HICCOUGHS: ICD-10-CM

## 2017-09-13 DIAGNOSIS — R06.6 INTRACTABLE HICCUPS: ICD-10-CM

## 2017-09-13 PROCEDURE — 71020 XR CHEST PA LAT: CPT

## 2017-09-15 RX ORDER — PROCHLORPERAZINE MALEATE 5 MG
5 TABLET ORAL
Qty: 15 TAB | Refills: 1 | Status: SHIPPED | OUTPATIENT
Start: 2017-09-15 | End: 2018-01-18

## 2017-10-15 DIAGNOSIS — E78.00 HYPERCHOLESTEREMIA: ICD-10-CM

## 2017-10-16 RX ORDER — ATORVASTATIN CALCIUM 20 MG/1
20 TABLET, FILM COATED ORAL DAILY
Qty: 90 TAB | Refills: 3 | Status: SHIPPED | COMMUNITY
Start: 2017-10-16 | End: 2018-08-31 | Stop reason: SDUPTHER

## 2018-01-18 ENCOUNTER — OFFICE VISIT (OUTPATIENT)
Dept: CARDIOLOGY CLINIC | Age: 68
End: 2018-01-18

## 2018-01-18 VITALS
BODY MASS INDEX: 25.55 KG/M2 | HEIGHT: 67 IN | WEIGHT: 162.8 LBS | DIASTOLIC BLOOD PRESSURE: 74 MMHG | SYSTOLIC BLOOD PRESSURE: 130 MMHG

## 2018-01-18 DIAGNOSIS — I10 ESSENTIAL HYPERTENSION: ICD-10-CM

## 2018-01-18 DIAGNOSIS — I49.3 PVC (PREMATURE VENTRICULAR CONTRACTION): Primary | ICD-10-CM

## 2018-01-18 RX ORDER — FLECAINIDE ACETATE 50 MG/1
TABLET ORAL 2 TIMES DAILY
COMMUNITY
End: 2018-06-25 | Stop reason: SDUPTHER

## 2018-01-18 NOTE — MR AVS SNAPSHOT
[address] [facility] 
[phone] Patient: Charan Britt MRN: [id number] YCE:[birth year] Visit Information Date & Time Provider Department Dept. Phone Encounter #  
 1/18/2018  8:40 AM Joe Tsang MD [facility] [phone] [id number] Follow-up Instructions Return in about 6 months (around 7/18/2018). Your Appointments 3/26/2018  9:00 AM  
Medicare Physical with Elbridge Burkitt, MD  
[facility] Internal Medicine [facility]) Appt Note: mwa  
 [address] [facility] [facility] [address] [facility] [id number] [place] [address] [address] [facility] Upcoming Health Maintenance Date Due  
 GLAUCOMA SCREENING Q2Y 2/1/2018 MEDICARE YEARLY EXAM 3/22/2018 COLONOSCOPY 8/8/2022 DTaP/Tdap/Td series (2 - Td) 3/21/2027 Allergies as of 1/18/2018  Review Complete On: 1/18/2018 By: Joe Tsang MD  
 No Known Allergies Current Immunizations  Reviewed on 9/20/2017 Name Date Influenza Vaccine 10/7/2015 Influenza Vaccine (Quad) PF 9/20/2017, 9/21/2016 Influenza Vaccine Whole 12/5/2012 Pneumococcal Conjugate (PCV-13) 4/7/2017 Pneumococcal Polysaccharide (PPSV-23) 12/17/2013 Td 1/1/2008 Tdap 3/21/2017 Zoster Vaccine, Live 6/12/2014 Not reviewed this visit You Were Diagnosed With   
  
 Codes Comments PVC (premature ventricular contraction)    -  Primary ICD-10-CM: I49.3 ICD-9-CM: 427.69 Essential hypertension     ICD-10-CM: I10 
ICD-9-CM: 401.9 Vitals BP Height(growth percentile) Weight(growth percentile) BMI Smoking Status 130/74 (BP 1 Location: Left arm, BP Patient Position: Sitting) 5' 7\" (1.702 m) 162 lb 12.8 oz (73.8 kg) 25.5 kg/m2 Former Smoker Vitals History BMI and BSA Data Body Mass Index Body Surface Area  25.5 kg/m 2 1.87 m 2  
  
  
 Preferred Pharmacy Pharmacy Name Phone [address], [facility] [phone] Your Updated Medication List  
  
   
This list is accurate as of: 1/18/18  9:09 AM.  Always use your most recent med list. amLODIPine 5 mg tablet Commonly known as:  Lynnell Manual Take 1 Tab by mouth daily. aspirin delayed-release 81 mg tablet Take  by mouth daily. atorvastatin 20 mg tablet Commonly known as:  LIPITOR Take 1 Tab by mouth daily. flecainide 50 mg tablet Commonly known as:  TAMBOCOR Take  by mouth two (2) times a day. sildenafil citrate 100 mg tablet Commonly known as:  VIAGRA Take 1 Tab by mouth as needed. tamsulosin 0.4 mg capsule Commonly known as:  FLOMAX Take 0.4 mg by mouth daily. We Performed the Following AMB POC EKG ROUTINE W/ 12 LEADS, INTER & REP [55023 CPT(R)] Follow-up Instructions Return in about 6 months (around 7/18/2018). Introducing [address]! Dear Betty Ruiz: 
Thank you for requesting a [employer] account. Our records indicate that you already have an active [employer] account. You can access your account anytime at [email or web address] Did you know that you can access your hospital and ER discharge instructions at any time in [employer]? You can also review all of your test results from your hospital stay or ER visit. Additional Information If you have questions, please visit the Frequently Asked Questions section of the [employer] website at [email or web address] Remember, [employer] is NOT to be used for urgent needs. For medical emergencies, dial 911. Now available from your iPhone and Android! Please provide this summary of care documentation to your next provider. Your primary care clinician is listed as REJI BOOGIE.  If you have any questions after today's visit, please call [phone].

## 2018-01-18 NOTE — PROGRESS NOTES
Cardiac Electrophysiology Office Note     Subjective: Alexandrea Carranza is a 69 y.o. male patient who is seen for evaluation of PVCs   He was kindly referred by Dr. Ida Ahumada. 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 For several years it has been transient and rarely bothered him. The past couple months he has noticed them more and thinks they have been occuring more frequently after spring break trip with 2 children in high school  He reports he feels the sensation of a skipped beat. Associated symptoms include \"queezy feeling\". He started the flecainide when he returned from his trip to [place]. So far he is tolerating the flecainide , no lightheadedness or dizziness. No chest pain or SOB. He had emailed us before his trip and reported that he thought the PVCs were actually bothering him more than he thought they did and that the PVCs were disturbing his sleep. He says that when he went on his trip he had no symptoms of PVCs (had not started flecainide yet). He started the flecainide when he got back and he thinks it is helping, but he sometimes has brief episodes of PVCs when he is exercising. Pmhx includes hyperlipidemia. Exercise stress nuclear test 8/2016 showed   Good exercise capacity. No ischemia on ECG, PVC, but there are frequent PACs  BP was very high 250/100 with exercise.  Perfusion images showed diaphragmatic attenuation artifacts in the inferior and inferolateral walls (base to distal)  GATED SPECT: LVEF 69% normal wall motion and thickening    Overall study: no ischemia or infarct  PACs but no PVC  Flecainide is effective in suppressing PVCs  BP is high and recommend BP medication: norvasc 5 mg every day to start    Holter before flecainide showed 33 thousands PVCs, mostly one morphology and appears to come from outflow tract but there is another PVC with superior axis and not coming from same place     Normal stress echo  and normal EF on recent echo        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 5 children (all in good health), . Half brother may have a pacemaker. Stress echo 12 with Dr Lacey Dakins  Normal resting electrocardiogram.  Above average functional capacity (>20%).   Appropriate heart rate response to exercise.  Normal resting blood pressure.  Appropriate blood pressure response to exercise. No chest pain.  No arrhythmias.  No ST changes.  Overall impression: Normal stress electrocardiogram.  Resting echo with normal regional and  global contractility.  Post exercise, uniformly enhanced myocardial contractility. Patient Active Problem List    Diagnosis Date Noted    Advanced directives, counseling/discussion 2016    PVC (premature ventricular contraction) 2016    Erectile dysfunction 2016    Encounter for long-term (current) use of other medications 2015    Unspecified vitamin D deficiency 2015    Attention deficit disorder without mention of hyperactivity 2014    Proteinuria 2014    Elevated blood pressure reading without diagnosis of hypertension 2014    Tinnitus of both ears 2013    Urticaria 2013    Calculus of kidney 2013    Insomnia, unspecified 2013    Mitral prolapse 2012    Hypercholesteremia 2012     Current Outpatient Prescriptions   Medication Sig Dispense Refill    flecainide (TAMBOCOR) 50 mg tablet Take  by mouth two (2) times a day.  atorvastatin (LIPITOR) 20 mg tablet Take 1 Tab by mouth daily. 90 Tab 3    tamsulosin (FLOMAX) 0.4 mg capsule Take 0.4 mg by mouth daily.       amLODIPine (NORVASC) 5 mg tablet Take 1 Tab by mouth daily. 90 Tab 1    sildenafil citrate (VIAGRA) 100 mg tablet Take 1 Tab by mouth as needed. 6 Tab 11    aspirin delayed-release 81 mg tablet Take  by mouth daily.  prochlorperazine (COMPAZINE) 5 mg tablet Take 1 Tab by mouth every eight (8) hours as needed for Nausea. 15 Tab 1     No Known Allergies  Past Medical History:   Diagnosis Date    Calculus of kidney 2011    Depression     Hypercholesterolemia     Hyperlipidemia     Kidney stone     Mitral valve prolapse     Polycystic liver disease      Past Surgical History:   Procedure Laterality Date    ENDOSCOPY, COLON, DIAGNOSTIC  2005    polyp- dr Dave Marie, 2012, due 16    HX HEENT      uvulectomy    HX VASECTOMY  2002     Family History   Problem Relation Age of Onset    Heart Disease Mother     Cancer Mother     Stroke Mother     Stroke Father     No Known Problems Sister     No Known Problems Brother      Social History   Substance Use Topics    Smoking status: Former Smoker     Packs/day: 1.00     Years: 12.00     Types: Cigarettes    Smokeless tobacco: Never Used    Alcohol use Yes      Comment: wine 1-2 glasse per day         Review of Systems:   Constitutional: Negative for fever, chills, weight loss, malaise/fatigue. HEENT: Negative for nosebleeds, vision changes. Respiratory: Negative for cough, hemoptysis, sputum production, and wheezing. Cardiovascular: Negative for chest pain, + episodic palpitations, no orthopnea, claudication, leg swelling, syncope, and PND. Gastrointestinal: Negative for nausea, vomiting, diarrhea, constipation, blood in stool and melena. Genitourinary: Negative for dysuria, and hematuria. Musculoskeletal: Negative for myalgias, arthralgia. Skin: Negative for rash. Heme: Does not bleed or bruise easily.    Neurological: Negative for speech change and focal weakness     Objective:     Visit Vitals    /74 (BP 1 Location: Left arm, BP Patient Position: Sitting)    Ht 5' 7\" (1.702 m)    Wt 162 lb 12.8 oz (73.8 kg)    BMI 25.5 kg/m2      Physical Exam:   Constitutional: well-developed and well-nourished. No distress. Head: Normocephalic and atraumatic. Eyes: Pupils are equal, round  Neck: supple. No JVD present. Cardiovascular: normal rate, regular rhythm and normal heart sounds. Exam reveals no gallop and no friction rub. No murmur heard. Pulmonary/Chest: Effort normal and breath sounds normal. No wheezes. Abdominal: Soft, no tenderness. Musculoskeletal: no edema. Neurological: alert,oriented. Skin: Skin is warm and dry  Psychiatric: normal mood and affect. Behavior is normal. Judgment and thought content normal.      EKG: sinus rhythm, 1 st degree Av block   Assessment/Plan:       ICD-10-CM ICD-9-CM    1. PVC (premature ventricular contraction) I49.3 427.69 AMB POC EKG ROUTINE W/ 12 LEADS, INTER & REP   2. Essential hypertension I10 401.9    Continue Flecainide 50 mg BID, ECG today shows normal QRS duration   I reviewed PVC ablation if he is unable to tolerate or fails medications. Stop 100 mg flecainide dose   No angina or CHF symptoms  Follow-up Disposition: 6 months    Future Appointments  Date Time Provider Humaira Ni   3/26/2018 9:00 AM Che Dumont MD [address] [facility]         Thank you for involving me in this patient's care and please call with further concerns or questions. Rogerio Pizarro M.D.   Electrophysiology/Cardiology  [facility] [place]  [address] [place] [address]  [phone]

## 2018-05-02 ENCOUNTER — OFFICE VISIT (OUTPATIENT)
Dept: INTERNAL MEDICINE CLINIC | Age: 68
End: 2018-05-02

## 2018-05-02 VITALS
WEIGHT: 161 LBS | RESPIRATION RATE: 16 BRPM | SYSTOLIC BLOOD PRESSURE: 139 MMHG | DIASTOLIC BLOOD PRESSURE: 89 MMHG | HEIGHT: 67 IN | TEMPERATURE: 97.5 F | OXYGEN SATURATION: 97 % | HEART RATE: 58 BPM | BODY MASS INDEX: 25.27 KG/M2

## 2018-05-02 DIAGNOSIS — Z00.00 ROUTINE GENERAL MEDICAL EXAMINATION AT A HEALTH CARE FACILITY: Primary | ICD-10-CM

## 2018-05-02 DIAGNOSIS — H93.13 TINNITUS OF BOTH EARS: ICD-10-CM

## 2018-05-02 DIAGNOSIS — Z12.5 PROSTATE CANCER SCREENING: ICD-10-CM

## 2018-05-02 DIAGNOSIS — N52.9 ERECTILE DYSFUNCTION, UNSPECIFIED ERECTILE DYSFUNCTION TYPE: ICD-10-CM

## 2018-05-02 DIAGNOSIS — Z23 ENCOUNTER FOR IMMUNIZATION: ICD-10-CM

## 2018-05-02 RX ORDER — SILDENAFIL 100 MG/1
100 TABLET, FILM COATED ORAL AS NEEDED
Qty: 15 TAB | Refills: 11 | Status: SHIPPED | COMMUNITY
Start: 2018-05-02 | End: 2018-06-29 | Stop reason: SDUPTHER

## 2018-05-02 NOTE — MR AVS SNAPSHOT
[address] [facility] [address] [place] 
[phone] Patient: Kelly Chow MRN: [id number] MWI:6/08/1888 Visit Information Date & Time Provider Department Dept. Phone Encounter #  
 5/2/2018  3:00 PM Daisy Glover, [address] [place] [address] Internal Medicine [phone] [id number] Follow-up Instructions Return in about 1 year (around 5/2/2019). Your Appointments 7/24/2018  8:20 AM  
ESTABLISHED PATIENT with Dudley Addison MD  
[facility] ([facility]) Appt Note: 6 mo f/u per Dr. Farzaneh Rush [address]  [address] [place] [address]  
[address] [address] Upcoming Health Maintenance Date Due  
 GLAUCOMA SCREENING Q2Y 2/1/2018 Influenza Age 5 to Adult 8/1/2018 COLONOSCOPY 8/8/2022 DTaP/Tdap/Td series (2 - Td) 3/21/2027 Allergies as of 5/2/2018  Review Complete On: 5/2/2018 By: Daisy Glover MD  
 No Known Allergies Current Immunizations  Reviewed on 9/20/2017 Name Date Influenza Vaccine 10/7/2015 Influenza Vaccine (Quad) PF 9/20/2017, 9/21/2016 Influenza Vaccine Whole 12/5/2012 Pneumococcal Conjugate (PCV-13) 4/7/2017 Pneumococcal Polysaccharide (PPSV-23) 12/17/2013 Td 1/1/2008 Tdap 3/21/2017 Zoster Vaccine, Live 6/12/2014 Not reviewed this visit You Were Diagnosed With   
  
 Codes Comments Routine general medical examination at a health care facility    -  Primary ICD-10-CM: Z00.00 ICD-9-CM: V70.0 Tinnitus of both ears     ICD-10-CM: H93.13 
ICD-9-CM: 388.30 Erectile dysfunction, unspecified erectile dysfunction type     ICD-10-CM: N52.9 ICD-9-CM: 607.84 Prostate cancer screening     ICD-10-CM: Z12.5 ICD-9-CM: V76.44 Encounter for immunization     ICD-10-CM: S67 ICD-9-CM: V03.89 Vitals BP Pulse Temp Resp Height(growth percentile) Weight(growth percentile) 139/89 (BP 1 Location: Right arm, BP Patient Position: Sitting) (!) 58 97.5 °F (36.4 °C) (Oral) 16 5' 6.5\" (1.689 m) 161 lb (73 kg) SpO2 BMI Smoking Status 97% 25.6 kg/m2 Former Smoker BMI and BSA Data Body Mass Index Body Surface Area  
 25.6 kg/m 2 1.85 m 2 Preferred Pharmacy Pharmacy Name Phone Jeanmarie Hernandez, [facility] [phone] Your Updated Medication List  
  
   
This list is accurate as of 18  3:51 PM.  Always use your most recent med list. amLODIPine 5 mg tablet Commonly known as:  Lugo Barges Take 1 Tab by mouth daily. aspirin delayed-release 81 mg tablet Take  by mouth daily. atorvastatin 20 mg tablet Commonly known as:  LIPITOR Take 1 Tab by mouth daily. flecainide 50 mg tablet Commonly known as:  TAMBOCOR Take  by mouth two (2) times a day. sildenafil citrate 100 mg tablet Commonly known as:  VIAGRA Take 1 Tab by mouth as needed. tamsulosin 0.4 mg capsule Commonly known as:  FLOMAX Take 0.4 mg by mouth daily. varicella-zoster recombinant (PF) 50 mcg/0.5 mL Susr injection Commonly known as:  SHINGRIX  
0.5 mL by IntraMUSCular route once for 1 dose. Prescriptions Printed Refills  
 varicella-zoster recombinant, PF, (SHINGRIX) 50 mcg/0.5 mL susr injection 1 Si.5 mL by IntraMUSCular route once for 1 dose. Class: Print Route: IntraMUSCular Prescriptions Sent to Mail Order Refills  
 sildenafil citrate (VIAGRA) 100 mg tablet 11 Sig: Take 1 Tab by mouth as needed. Class: Mail [facility] [address] [place] [address] #: [phone] Route: Oral  
  
We Performed the Following CBC WITH AUTOMATED DIFF [47738 CPT(R)] LIPID PANEL [44422 CPT(R)] METABOLIC PANEL, COMPREHENSIVE [78960 CPT(R)] PSA SCREENING (SCREENING) [ [facility]] TSH 3RD GENERATION [80815 CPT(R)] URINALYSIS W/ RFLX MICROSCOPIC [33671 CPT(R)] Follow-up Instructions Return in about 1 year (around 5/2/2019). Introducing [facility] & HEALTH SERVICES! Dear Sarah Rosas: 
Thank you for requesting a [employer] account. Our records indicate that you already have an active [employer] account. You can access your account anytime at [email or web address] Did you know that you can access your hospital and ER discharge instructions at any time in [employer]? You can also review all of your test results from your hospital stay or ER visit. Additional Information If you have questions, please visit the Frequently Asked Questions section of the [employer] website at [email or web address] Remember, [employer] is NOT to be used for urgent needs. For medical emergencies, dial 911. Now available from your iPhone and Android! Please provide this summary of care documentation to your next provider. Your primary care clinician is listed as REJI BOOGIE. If you have any questions after today's visit, please call [phone].

## 2018-05-02 NOTE — PROGRESS NOTES
HISTORY OF PRESENT ILLNESS  Courtney Paredes is a 69 y.o. male. UBALDO Bennett is seen today for a complete physical examination and follow up of chronic problems. 1.  Preventive medicine. Fully reviewed today. He is due for a complete physical examination and routine screening laboratory studies. He is up to date with eye exam and colonoscopy. Vaccinations were also reviewed. Stool testing for occult blood not indicated given fairly recent colonoscopy. 2.  Chronic medical problems are reviewed. - He does well with Flomax. His ophthalmologist mentioned difficulties with cataract surgery in years to come due to this medication. My understanding from other ophthalmologists I have spoken with was this was not a major concern anymore due to improved surgical techniques. For now, continue current regimen. - Hypertension, better readings at home. Review of  Systems:  Notable for a reaction to hydrogen peroxide that he used to wash his mouth guard. This led to some prolonged symptoms and several specialist evaluations, but fortunately, has all resolved. We counseled regarding healthy lifestyle issues including diet, exercise and stress management. Family history, social history, etc. Are reviewed and updated, see electronic record. Review of Systems   Constitutional: Negative for weight loss. Respiratory: Negative. Cardiovascular: Negative for chest pain, palpitations, leg swelling and PND. Gastrointestinal: Negative. Genitourinary: Positive for frequency. Musculoskeletal: Negative for myalgias. Neurological: Negative for focal weakness. Physical Exam   Constitutional: He is oriented to person, place, and time. He appears well-developed and well-nourished. No distress. HENT:   Head: Normocephalic and atraumatic.    Right Ear: Tympanic membrane, external ear and ear canal normal.   Left Ear: Tympanic membrane, external ear and ear canal normal.   Eyes: EOM are normal. Pupils are equal, round, and reactive to light. Right eye exhibits no discharge. Left eye exhibits no discharge. Neck: Normal range of motion. Neck supple. Carotid bruit is not present. No thyromegaly present. Cardiovascular: Normal rate, regular rhythm, normal heart sounds and intact distal pulses. Exam reveals no gallop and no friction rub. No murmur heard. Pulmonary/Chest: Effort normal and breath sounds normal. No respiratory distress. He has no wheezes. He has no rales. Abdominal: Soft. Bowel sounds are normal. He exhibits no distension and no mass. There is no tenderness. There is no rebound and no guarding. Genitourinary: Rectum normal. Rectal exam shows no mass and no tenderness. Prostate is enlarged. Prostate is not tender. Musculoskeletal: Normal range of motion. He exhibits no edema or tenderness. Lymphadenopathy:     He has no cervical adenopathy. Neurological: He is alert and oriented to person, place, and time. He has normal reflexes. Skin: Skin is warm and dry. No rash noted. Psychiatric: He has a normal mood and affect. His behavior is normal.   Nursing note and vitals reviewed. ASSESSMENT and PLAN  Diagnoses and all orders for this visit:    1. Routine general medical examination at a health care facility  -     METABOLIC PANEL, COMPREHENSIVE  -     CBC WITH AUTOMATED DIFF  -     LIPID PANEL  -     URINALYSIS W/ RFLX MICROSCOPIC  -     TSH 3RD GENERATION    2. Tinnitus of both ears- See specialists  as directed. 3. Erectile dysfunction, unspecified erectile dysfunction type  -     sildenafil citrate (VIAGRA) 100 mg tablet; Take 1 Tab by mouth as needed. 4. Prostate cancer screening  -     PSA SCREENING (SCREENING)    5. Encounter for immunization  -     varicella-zoster recombinant, PF, (SHINGRIX) 50 mcg/0.5 mL susr injection; 0.5 mL by IntraMUSCular route once for 1 dose.

## 2018-05-17 LAB
ALBUMIN SERPL-MCNC: 4.2 G/DL (ref 3.6–4.8)
ALBUMIN/GLOB SERPL: 2 {RATIO} (ref 1.2–2.2)
ALP SERPL-CCNC: 89 IU/L (ref 39–117)
ALT SERPL-CCNC: 23 IU/L (ref 0–44)
APPEARANCE UR: CLEAR
AST SERPL-CCNC: 23 IU/L (ref 0–40)
BASOPHILS # BLD AUTO: 0 X10E3/UL (ref 0–0.2)
BASOPHILS NFR BLD AUTO: 1 %
BILIRUB SERPL-MCNC: 0.4 MG/DL (ref 0–1.2)
BILIRUB UR QL STRIP: NEGATIVE
BUN SERPL-MCNC: 14 MG/DL (ref 8–27)
BUN/CREAT SERPL: 14 (ref 10–24)
CALCIUM SERPL-MCNC: 9.1 MG/DL (ref 8.6–10.2)
CHLORIDE SERPL-SCNC: 103 MMOL/L (ref 96–106)
CHOLEST SERPL-MCNC: 148 MG/DL (ref 100–199)
CO2 SERPL-SCNC: 27 MMOL/L (ref 18–29)
COLOR UR: YELLOW
CREAT SERPL-MCNC: 0.97 MG/DL (ref 0.76–1.27)
EOSINOPHIL # BLD AUTO: 0.1 X10E3/UL (ref 0–0.4)
EOSINOPHIL NFR BLD AUTO: 3 %
ERYTHROCYTE [DISTWIDTH] IN BLOOD BY AUTOMATED COUNT: 13.5 % (ref 12.3–15.4)
GFR SERPLBLD CREATININE-BSD FMLA CKD-EPI: 79 ML/MIN/1.73
GFR SERPLBLD CREATININE-BSD FMLA CKD-EPI: 92 ML/MIN/1.73
GLOBULIN SER CALC-MCNC: 2.1 G/DL (ref 1.5–4.5)
GLUCOSE SERPL-MCNC: 92 MG/DL (ref 65–99)
GLUCOSE UR QL: NEGATIVE
HCT VFR BLD AUTO: 44.6 % (ref 37.5–51)
HDLC SERPL-MCNC: 46 MG/DL
HGB BLD-MCNC: 15.4 G/DL (ref 13–17.7)
HGB UR QL STRIP: NEGATIVE
IMM GRANULOCYTES # BLD: 0 X10E3/UL (ref 0–0.1)
IMM GRANULOCYTES NFR BLD: 0 %
KETONES UR QL STRIP: NEGATIVE
LDLC SERPL CALC-MCNC: 83 MG/DL (ref 0–99)
LEUKOCYTE ESTERASE UR QL STRIP: NEGATIVE
LYMPHOCYTES # BLD AUTO: 1.1 X10E3/UL (ref 0.7–3.1)
LYMPHOCYTES NFR BLD AUTO: 19 %
MCH RBC QN AUTO: 31.8 PG (ref 26.6–33)
MCHC RBC AUTO-ENTMCNC: 34.5 G/DL (ref 31.5–35.7)
MCV RBC AUTO: 92 FL (ref 79–97)
MICRO URNS: NORMAL
MONOCYTES # BLD AUTO: 0.7 X10E3/UL (ref 0.1–0.9)
MONOCYTES NFR BLD AUTO: 12 %
NEUTROPHILS # BLD AUTO: 3.7 X10E3/UL (ref 1.4–7)
NEUTROPHILS NFR BLD AUTO: 65 %
NITRITE UR QL STRIP: NEGATIVE
PH UR STRIP: 5 [PH] (ref 5–7.5)
PLATELET # BLD AUTO: 192 X10E3/UL (ref 150–379)
POTASSIUM SERPL-SCNC: 4.5 MMOL/L (ref 3.5–5.2)
PROT SERPL-MCNC: 6.3 G/DL (ref 6–8.5)
PROT UR QL STRIP: NEGATIVE
PSA SERPL-MCNC: 3.4 NG/ML (ref 0–4)
RBC # BLD AUTO: 4.85 X10E6/UL (ref 4.14–5.8)
SODIUM SERPL-SCNC: 143 MMOL/L (ref 134–144)
SP GR UR: 1.02 (ref 1–1.03)
TRIGL SERPL-MCNC: 96 MG/DL (ref 0–149)
TSH SERPL DL<=0.005 MIU/L-ACNC: 2.44 UIU/ML (ref 0.45–4.5)
UROBILINOGEN UR STRIP-MCNC: 0.2 MG/DL (ref 0.2–1)
VLDLC SERPL CALC-MCNC: 19 MG/DL (ref 5–40)
WBC # BLD AUTO: 5.7 X10E3/UL (ref 3.4–10.8)

## 2018-06-22 RX ORDER — AMLODIPINE BESYLATE 5 MG/1
TABLET ORAL
Qty: 90 TAB | Refills: 1 | Status: SHIPPED | OUTPATIENT
Start: 2018-06-22 | End: 2019-02-17 | Stop reason: SDUPTHER

## 2018-06-22 NOTE — TELEPHONE ENCOUNTER
Request for Norvasc 5 mg daily. Last office visit 1/18/18, next office visit 7/24/18. Refills per verbal order from Dr. Arlene Kenney.

## 2018-06-25 RX ORDER — FLECAINIDE ACETATE 50 MG/1
TABLET ORAL
Qty: 180 TAB | Refills: 1 | OUTPATIENT
Start: 2018-06-25

## 2018-06-25 RX ORDER — FLECAINIDE ACETATE 50 MG/1
50 TABLET ORAL 2 TIMES DAILY
Qty: 180 TAB | Refills: 1 | Status: SHIPPED | OUTPATIENT
Start: 2018-06-25 | End: 2018-12-26 | Stop reason: SDUPTHER

## 2018-06-25 NOTE — TELEPHONE ENCOUNTER
Request for flecainide 50mg twice a day. Last office visit 1/18/18, next office visit 7/24/18. Refills per verbal order from Dr. Mary Escobar.

## 2018-06-29 DIAGNOSIS — N52.9 ERECTILE DYSFUNCTION, UNSPECIFIED ERECTILE DYSFUNCTION TYPE: ICD-10-CM

## 2018-07-05 RX ORDER — SILDENAFIL 100 MG/1
100 TABLET, FILM COATED ORAL AS NEEDED
Qty: 8 TAB | Refills: 0 | Status: SHIPPED | OUTPATIENT
Start: 2018-07-05 | End: 2020-01-28 | Stop reason: SDUPTHER

## 2018-07-24 ENCOUNTER — OFFICE VISIT (OUTPATIENT)
Dept: CARDIOLOGY CLINIC | Age: 68
End: 2018-07-24

## 2018-07-24 VITALS
BODY MASS INDEX: 26.52 KG/M2 | RESPIRATION RATE: 16 BRPM | SYSTOLIC BLOOD PRESSURE: 110 MMHG | OXYGEN SATURATION: 98 % | HEIGHT: 66 IN | DIASTOLIC BLOOD PRESSURE: 70 MMHG | WEIGHT: 165 LBS | HEART RATE: 52 BPM

## 2018-07-24 DIAGNOSIS — I10 ESSENTIAL HYPERTENSION: ICD-10-CM

## 2018-07-24 DIAGNOSIS — Z79.899 HIGH RISK MEDICATION USE: ICD-10-CM

## 2018-07-24 DIAGNOSIS — I44.0 FIRST DEGREE AV BLOCK: ICD-10-CM

## 2018-07-24 DIAGNOSIS — R00.2 PALPITATIONS: ICD-10-CM

## 2018-07-24 DIAGNOSIS — R00.1 SINUS BRADYCARDIA: ICD-10-CM

## 2018-07-24 DIAGNOSIS — I49.3 PVC (PREMATURE VENTRICULAR CONTRACTION): Primary | ICD-10-CM

## 2018-07-24 NOTE — MR AVS SNAPSHOT
[address] [facility] [address] [facility] [address] 
[phone] Patient: Richar Sawant MRN: [id number] ABW:[birth year] Visit Information Date & Time Provider Department Dept. Phone Encounter #  
 7/24/2018  8:20 AM Rachel Pfeiffer MD [facility] [phone] [id number] Follow-up Instructions Return in about 6 months (around 1/24/2019). Your Appointments 5/6/2019  3:00 PM  
Complete Physical with Jordan Coyne MD  
[facility] Internal Medicine [facility]) Appt Note: [address] [facility] [address]  
[address] [facility] Upcoming Health Maintenance Date Due  
 MEDICARE YEARLY EXAM 7/12/2018 Influenza Age 5 to Adult 8/1/2018 GLAUCOMA SCREENING Q2Y 3/1/2020 COLONOSCOPY 8/8/2022 DTaP/Tdap/Td series (2 - Td) 3/21/2027 Allergies as of 7/24/2018  Review Complete On: 7/24/2018 By: Rachel Pfeiffer MD  
 No Known Allergies Current Immunizations  Reviewed on 6/5/2018 Name Date Influenza Vaccine 10/7/2015 Influenza Vaccine (Quad) PF 9/20/2017, 9/21/2016 Influenza Vaccine Whole 12/5/2012 Pneumococcal Conjugate (PCV-13) 4/7/2017 Pneumococcal Polysaccharide (PPSV-23) 12/17/2013 Td 1/1/2008 Tdap 3/21/2017 Zoster Recombinant 5/31/2018 Zoster Vaccine, Live 6/12/2014 Not reviewed this visit You Were Diagnosed With   
  
 Codes Comments PVC (premature ventricular contraction)    -  Primary ICD-10-CM: I49.3 ICD-9-CM: 427.69 Essential hypertension     ICD-10-CM: I10 
ICD-9-CM: 401.9 High risk medication use     ICD-10-CM: Z79.899 ICD-9-CM: V58.69 Palpitations     ICD-10-CM: R00.2 ICD-9-CM: 785.1 Sinus bradycardia     ICD-10-CM: R00.1 ICD-9-CM: 427.89 First degree AV block     ICD-10-CM: I44.0 ICD-9-CM: 426.11 Vitals BP Pulse Resp Height(growth percentile) Weight(growth percentile) SpO2  
 110/70 (BP 1 Location: Left arm, BP Patient Position: Sitting) (!) 52 16 5' 5.5\" (1.664 m) 165 lb (74.8 kg) 98% BMI Smoking Status 27.04 kg/m2 Former Smoker Vitals History BMI and BSA Data Body Mass Index Body Surface Area  
 27.04 kg/m 2 1.86 m 2 Preferred Pharmacy Pharmacy Name Phone [id number] [address] [phone] Your Updated Medication List  
  
   
This list is accurate as of 7/24/18  9:08 AM.  Always use your most recent med list. amLODIPine 5 mg tablet Commonly known as:  Rich Cater TAKE 1 TABLET DAILY  
  
 aspirin delayed-release 81 mg tablet Take  by mouth daily. atorvastatin 20 mg tablet Commonly known as:  LIPITOR Take 1 Tab by mouth daily. flecainide 50 mg tablet Commonly known as:  TAMBOCOR Take 1 Tab by mouth two (2) times a day. sildenafil citrate 100 mg tablet Commonly known as:  VIAGRA Take 1 Tab by mouth as needed. Fill as generic.  
  
 tamsulosin 0.4 mg capsule Commonly known as:  FLOMAX Take 0.4 mg by mouth daily. We Performed the Following AMB POC EKG ROUTINE W/ 12 LEADS, INTER & REP [76728 CPT(R)] Follow-up Instructions Return in about 6 months (around 1/24/2019). Introducing [facility] & HEALTH SERVICES! Dear Bronwyn Hernández: 
Thank you for requesting a [employer] account. Our records indicate that you already have an active [employer] account. You can access your account anytime at [email or web address] Did you know that you can access your hospital and ER discharge instructions at any time in [employer]? You can also review all of your test results from your hospital stay or ER visit. Additional Information If you have questions, please visit the Frequently Asked Questions section of the [employer] website at [email or web address] Remember, [employer] is NOT to be used for urgent needs. For medical emergencies, dial 911. Now available from your iPhone and Android! Please provide this summary of care documentation to your next provider. Your primary care clinician is listed as REJI BOOGIE. If you have any questions after today's visit, please call [phone].

## 2018-07-24 NOTE — PROGRESS NOTES
Cardiac Electrophysiology Office Note     Subjective: Nils Almonte is a 77 y.o. male patient who is seen for follow up of PVCs   He was kindly referred by Dr. Rios Vazquez. 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 For several years it has been transient and rarely bothered him. The past couple months he has noticed them more and thinks they have been occuring more frequently after spring break trip with 2 children in high school  He reports he feels the sensation of a skipped beat. Associated symptoms include \"queezy feeling\". He started the flecainide when he returned from his trip to [place]. So far he is tolerating the flecainide , no lightheadedness or dizziness. No chest pain or SOB. He had emailed us before his trip and reported that he thought the PVCs were actually bothering him more than he thought they did and that the PVCs were disturbing his sleep. He says that when he went on his trip he had no symptoms of PVCs (had not started flecainide yet). He started the flecainide when he got back and he thinks it is helping, but he sometimes has brief episodes of PVCs when he is exercising. Pmhx includes hyperlipidemia. Exercise stress nuclear test 8/2016 showed   Good exercise capacity. No ischemia on ECG, PVC, but there are frequent PACs  BP was very high 250/100 with exercise.  Perfusion images showed diaphragmatic attenuation artifacts in the inferior and inferolateral walls (base to distal)  GATED SPECT: LVEF 69% normal wall motion and thickening    Overall study: no ischemia or infarct  PACs but no PVC  Flecainide is effective in suppressing PVCs  BP is high and recommend BP medication: norvasc 5 mg every day to start    Holter before flecainide showed 33 thousands PVCs, mostly one morphology and appears to come from outflow tract but there is another PVC with superior axis and not coming from same place     Normal stress echo  and normal EF on recent echo        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 5 children (all in good health), . Half brother may have a pacemaker. Stress echo 12 with Dr Conchita Todd  Normal resting electrocardiogram.  Above average functional capacity (>20%).   Appropriate heart rate response to exercise.  Normal resting blood pressure.  Appropriate blood pressure response to exercise. No chest pain.  No arrhythmias.  No ST changes.  Overall impression: Normal stress electrocardiogram.  Resting echo with normal regional and  global contractility.  Post exercise, uniformly enhanced myocardial contractility. Patient Active Problem List    Diagnosis Date Noted    Advanced directives, counseling/discussion 2016    PVC (premature ventricular contraction) 2016    Erectile dysfunction 2016    Encounter for long-term (current) use of other medications 2015    Unspecified vitamin D deficiency 2015    Attention deficit disorder without mention of hyperactivity 2014    Proteinuria 2014    Elevated blood pressure reading without diagnosis of hypertension 2014    Tinnitus of both ears 2013    Urticaria 2013    Calculus of kidney 2013    Insomnia, unspecified 2013    Mitral prolapse 2012    Hypercholesteremia 2012     Current Outpatient Prescriptions   Medication Sig Dispense Refill    sildenafil citrate (VIAGRA) 100 mg tablet Take 1 Tab by mouth as needed.  Fill as generic. 8 Tab 0    flecainide (TAMBOCOR) 50 mg tablet Take 1 Tab by mouth two (2) times a day. 180 Tab 1    amLODIPine (NORVASC) 5 mg tablet TAKE 1 TABLET DAILY 90 Tab 1    atorvastatin (LIPITOR) 20 mg tablet Take 1 Tab by mouth daily. 90 Tab 3    tamsulosin (FLOMAX) 0.4 mg capsule Take 0.4 mg by mouth daily.  aspirin delayed-release 81 mg tablet Take  by mouth daily. No Known Allergies  Past Medical History:   Diagnosis Date    Calculus of kidney 2011    Depression     Hypercholesterolemia     Hyperlipidemia     Kidney stone     Mitral valve prolapse     Polycystic liver disease      Past Surgical History:   Procedure Laterality Date    ENDOSCOPY, COLON, DIAGNOSTIC  2005    polyp- dr Anna Baker, 2012, due 16    HX HEENT      uvulectomy    HX VASECTOMY  2002     Family History   Problem Relation Age of Onset    Heart Disease Mother     Cancer Mother     Stroke Mother     Stroke Father     No Known Problems Sister     No Known Problems Brother      Social History   Substance Use Topics    Smoking status: Former Smoker     Packs/day: 1.00     Years: 12.00     Types: Cigarettes    Smokeless tobacco: Never Used    Alcohol use Yes      Comment: wine 1-2 glasse per day         Review of Systems:   Constitutional: Negative for fever, chills, weight loss, malaise/fatigue. HEENT: Negative for nosebleeds, vision changes. Respiratory: Negative for cough, hemoptysis, sputum production, and wheezing. Cardiovascular: Negative for chest pain, + episodic palpitations, no orthopnea, claudication, leg swelling, syncope, and PND. Gastrointestinal: Negative for nausea, vomiting, diarrhea, constipation, blood in stool and melena. Genitourinary: Negative for dysuria, and hematuria. Musculoskeletal: Negative for myalgias, arthralgia. Skin: Negative for rash. Heme: Does not bleed or bruise easily.    Neurological: Negative for speech change and focal weakness     Objective:     Visit Vitals    /70 (BP 1 Location: Left arm, BP Patient Position: Sitting)    Pulse (!) 52    Resp 16    Ht 5' 5.5\" (1.664 m)    Wt 165 lb (74.8 kg)    SpO2 98%    BMI 27.04 kg/m2      Physical Exam:   Constitutional: well-developed and well-nourished. No distress. Head: Normocephalic and atraumatic. Eyes: Pupils are equal, round  Neck: supple. No JVD present. Cardiovascular: slow rate, regular rhythm and normal heart sounds. Exam reveals no gallop and no friction rub. No murmur heard. Pulmonary/Chest: Effort normal and breath sounds normal. No wheezes. Abdominal: Soft, no tenderness. Musculoskeletal: no edema. full ROM  Neurological: alert, oriented. Skin: Skin is warm and dry  Psychiatric: normal mood and affect. Behavior is normal. Judgment and thought content normal.      EKG: sinus bradycardia, 1 st degree Av block   Assessment/Plan:       ICD-10-CM ICD-9-CM    1. PVC (premature ventricular contraction) I49.3 427.69 AMB POC EKG ROUTINE W/ 12 LEADS, INTER & REP   2. Essential hypertension I10 401.9    3. High risk medication use Z79.899 V58.69    4. Palpitations R00.2 785.1    5. Sinus bradycardia R00.1 427.89    6. First degree AV block I44.0 426.11    he does not think he has much PVC and symptoms so he would like to reduce Flecainide to 25 mg BID and then stop it  I reviewed PVC ablation if he is unable to tolerate or fails medications. No angina or CHF symptoms, no symptoms related to bradycardia or first degree av block  bp controlled with norvasc  Follow-up Disposition: 6 to 12 months    Future Appointments  Date Time Provider Humaira Ni   5/6/2019 3:00 PM Thomas Estrada MD [address] [facility]         Thank you for involving me in this patient's care and please call with further concerns or questions. Lorraine Carrasquillo M.D.   Electrophysiology/Cardiology  [facility] [place]  [address] , [address]  [phone]

## 2018-08-31 ENCOUNTER — OFFICE VISIT (OUTPATIENT)
Dept: FAMILY MEDICINE CLINIC | Age: 68
End: 2018-08-31

## 2018-08-31 VITALS
BODY MASS INDEX: 26.68 KG/M2 | HEIGHT: 66 IN | DIASTOLIC BLOOD PRESSURE: 61 MMHG | TEMPERATURE: 100.1 F | WEIGHT: 166 LBS | OXYGEN SATURATION: 95 % | SYSTOLIC BLOOD PRESSURE: 111 MMHG | HEART RATE: 72 BPM | RESPIRATION RATE: 16 BRPM

## 2018-08-31 DIAGNOSIS — B34.9 VIRAL ILLNESS: Primary | ICD-10-CM

## 2018-08-31 DIAGNOSIS — R52 BODY ACHES: ICD-10-CM

## 2018-08-31 DIAGNOSIS — R50.9 FEVER, UNSPECIFIED FEVER CAUSE: ICD-10-CM

## 2018-08-31 DIAGNOSIS — E78.00 HYPERCHOLESTEREMIA: ICD-10-CM

## 2018-08-31 LAB
FLUAV+FLUBV AG NOSE QL IA.RAPID: NEGATIVE POS/NEG
FLUAV+FLUBV AG NOSE QL IA.RAPID: NEGATIVE POS/NEG
VALID INTERNAL CONTROL?: YES

## 2018-08-31 RX ORDER — ATORVASTATIN CALCIUM 20 MG/1
20 TABLET, FILM COATED ORAL DAILY
Qty: 90 TAB | Refills: 3 | Status: SHIPPED | COMMUNITY
Start: 2018-08-31 | End: 2018-12-04 | Stop reason: SDUPTHER

## 2018-08-31 NOTE — TELEPHONE ENCOUNTER
From: Ana Maria Pollock  To:  Billie Castro MD  Sent: 8/31/2018 8:29 AM EDT  Subject: Medication Renewal Request    Original authorizing provider: MD Ana Maria Harrison would like a refill of the following medications:  atorvastatin (LIPITOR) 20 mg tablet Billie Castro MD]    Preferred pharmacy: [address] [place] [address]:

## 2018-12-04 DIAGNOSIS — E78.00 HYPERCHOLESTEREMIA: ICD-10-CM

## 2018-12-05 RX ORDER — ATORVASTATIN CALCIUM 20 MG/1
20 TABLET, FILM COATED ORAL DAILY
Qty: 90 TAB | Refills: 1 | Status: SHIPPED | COMMUNITY
Start: 2018-12-05 | End: 2019-03-07 | Stop reason: SDUPTHER

## 2018-12-27 RX ORDER — FLECAINIDE ACETATE 50 MG/1
50 TABLET ORAL 2 TIMES DAILY
Qty: 180 TAB | Refills: 1 | Status: SHIPPED | OUTPATIENT
Start: 2018-12-27 | End: 2019-01-29

## 2018-12-27 NOTE — TELEPHONE ENCOUNTER
Request for flecainide 50mg twice a day. Last office visit 7/24/18, next office visit 1/29/19. Refills per verbal order from Dr. Wolf Sebastian.

## 2019-01-29 ENCOUNTER — OFFICE VISIT (OUTPATIENT)
Dept: CARDIOLOGY CLINIC | Age: 69
End: 2019-01-29

## 2019-01-29 VITALS
WEIGHT: 171 LBS | HEART RATE: 63 BPM | HEIGHT: 65 IN | SYSTOLIC BLOOD PRESSURE: 150 MMHG | DIASTOLIC BLOOD PRESSURE: 82 MMHG | BODY MASS INDEX: 28.49 KG/M2

## 2019-01-29 DIAGNOSIS — R00.1 SINUS BRADYCARDIA: ICD-10-CM

## 2019-01-29 DIAGNOSIS — I44.0 FIRST DEGREE AV BLOCK: ICD-10-CM

## 2019-01-29 DIAGNOSIS — I10 ESSENTIAL HYPERTENSION: ICD-10-CM

## 2019-01-29 DIAGNOSIS — Z51.81 ENCOUNTER FOR MONITORING FLECAINIDE THERAPY: ICD-10-CM

## 2019-01-29 DIAGNOSIS — Z79.899 HIGH RISK MEDICATION USE: ICD-10-CM

## 2019-01-29 DIAGNOSIS — Z79.899 ENCOUNTER FOR MONITORING FLECAINIDE THERAPY: ICD-10-CM

## 2019-01-29 DIAGNOSIS — I49.3 PVC (PREMATURE VENTRICULAR CONTRACTION): Primary | ICD-10-CM

## 2019-01-29 DIAGNOSIS — R00.2 PALPITATIONS: ICD-10-CM

## 2019-01-29 RX ORDER — FLECAINIDE ACETATE 50 MG/1
50 TABLET ORAL
COMMUNITY
End: 2019-01-29

## 2019-01-29 NOTE — PROGRESS NOTES
Cardiac Electrophysiology Office Note     Subjective: Fahad Del Cid is a 77 y.o. male patient who is seen for follow up of PVCs   He was kindly referred by Dr. Tari Boas. Interim hx:   He is here today for follow up 6 months  In the last six months, the patient has had a very seldom irregular heart beat that is known as PVC. Sometimes he forgets to take the Flecainide dose in the morning. He was up at 10,000 feet above sea level and felt shortness of breath. Otherwise, he is still very active and has no activity limitation. Past hx:  He reports he has PVCs for a \"long time\". For several years it has been transient and rarely bothered him. The past couple months he has noticed them more and thinks they have been occuring more frequently after spring break trip with 2 children in high school  He reports he feels the sensation of a skipped beat. Associated symptoms include \"queezy feeling\". He started the flecainide when he returned from his trip to [place]. So far he is tolerating the flecainide , no lightheadedness or dizziness. No chest pain or SOB. He had emailed us before his trip and reported that he thought the PVCs were actually bothering him more than he thought they did and that the PVCs were disturbing his sleep. He says that when he went on his trip he had no symptoms of PVCs (had not started flecainide yet). He started the flecainide when he got back and he thinks it is helping, but he sometimes has brief episodes of PVCs when he is exercising. Pmhx includes hyperlipidemia. Exercise stress nuclear test 8/2016 showed   Good exercise capacity. No ischemia on ECG, PVC, but there are frequent PACs  BP was very high 250/100 with exercise.  Perfusion images showed diaphragmatic attenuation artifacts in the inferior and inferolateral walls (base to distal)  GATED SPECT: LVEF 69% normal wall motion and thickening    Overall study: no ischemia or infarct  PACs but no PVC  Flecainide is effective in suppressing PVCs  BP is high and recommend BP medication: norvasc 5 mg every day to start    Holter before flecainide showed 33 thousands PVCs, mostly one morphology and appears to come from outflow tract but there is another PVC with superior axis and not coming from same place     Normal stress echo  and normal EF on recent echo        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 5 children (all in good health), . Half brother may have a pacemaker. Stress echo 12 with Dr Tre Hoffman  Normal resting electrocardiogram.  Above average functional capacity (>20%).   Appropriate heart rate response to exercise.  Normal resting blood pressure.  Appropriate blood pressure response to exercise. No chest pain.  No arrhythmias.  No ST changes.  Overall impression: Normal stress electrocardiogram.  Resting echo with normal regional and  global contractility.  Post exercise, uniformly enhanced myocardial contractility. Patient Active Problem List    Diagnosis Date Noted    Advanced directives, counseling/discussion 2016    PVC (premature ventricular contraction) 2016    Erectile dysfunction 2016    Encounter for long-term (current) use of other medications 2015    Unspecified vitamin D deficiency 2015    Attention deficit disorder without mention of hyperactivity 2014    Proteinuria 2014    Elevated blood pressure reading without diagnosis of hypertension 2014    Tinnitus of both ears 2013    Urticaria 2013    Calculus of kidney 2013    Insomnia, unspecified 2013    Mitral prolapse 2012    Hypercholesteremia 2012     Current Outpatient Medications   Medication Sig Dispense Refill    FINASTERIDE PO Take  by mouth.       atorvastatin (LIPITOR) 20 mg tablet Take 1 Tab by mouth daily. 90 Tab 1    sildenafil citrate (VIAGRA) 100 mg tablet Take 1 Tab by mouth as needed. Fill as generic. 8 Tab 0    amLODIPine (NORVASC) 5 mg tablet TAKE 1 TABLET DAILY 90 Tab 1    tamsulosin (FLOMAX) 0.4 mg capsule Take 0.4 mg by mouth daily.  aspirin delayed-release 81 mg tablet Take  by mouth daily. No Known Allergies  Past Medical History:   Diagnosis Date    Calculus of kidney 2011    Depression     Hypercholesterolemia     Hyperlipidemia     Kidney stone     Mitral valve prolapse     Polycystic liver disease      Past Surgical History:   Procedure Laterality Date    ENDOSCOPY, COLON, DIAGNOSTIC  2005    polyp- dr Dhiraj Stephen, 2012, due 16    HX HEENT      uvulectomy    HX VASECTOMY  2002     Family History   Problem Relation Age of Onset    Heart Disease Mother     Cancer Mother    [facility] Stroke Mother     Stroke Father     No Known Problems Sister     No Known Problems Brother      Social History     Tobacco Use    Smoking status: Former Smoker     Packs/day: 1.00     Years: 12.00     Pack years: 12.00     Types: Cigarettes    Smokeless tobacco: Never Used   Substance Use Topics    Alcohol use: Yes     Comment: wine 1-2 glasse per day         Review of Systems:   Constitutional: Negative for fever, chills, weight loss, malaise/fatigue. HEENT: Negative for nosebleeds, vision changes. Respiratory: Negative for cough, hemoptysis, sputum production, and wheezing. Cardiovascular: Negative for chest pain, + episodic palpitations, no orthopnea, claudication, leg swelling, syncope, and PND. Gastrointestinal: Negative for nausea, vomiting, diarrhea, constipation, blood in stool and melena. Genitourinary: Negative for dysuria, and hematuria. Musculoskeletal: Negative for myalgias, arthralgia. Skin: Negative for rash. Heme: Does not bleed or bruise easily.    Neurological: Negative for speech change and focal weakness     Objective:     Visit Vitals  /82 Pulse 63   Ht 5' 5\" (1.651 m)   Wt 171 lb (77.6 kg)   BMI 28.46 kg/m²      Physical Exam:   Constitutional: well-developed and well-nourished. No distress. Head: Normocephalic and atraumatic. Eyes: Pupils are equal, round  Neck: supple. No JVD present. Cardiovascular: regular rhythm and normal heart sounds. Exam reveals no gallop and no friction rub. No murmur heard. Pulmonary/Chest: Effort normal and breath sounds normal. No wheezes. Abdominal: Soft, no tenderness. Musculoskeletal: no edema. full ROM  Neurological: alert, oriented. Skin: Skin is warm and dry  Psychiatric: normal mood and affect. Behavior is normal. Judgment and thought content normal.      EKG: sinus rhythm, 1 st degree Av block   Assessment/Plan:       ICD-10-CM ICD-9-CM    1. PVC (premature ventricular contraction) I49.3 427.69 AMB POC EKG ROUTINE W/ 12 LEADS, INTER & REP   2. Essential hypertension I10 401.9    3. High risk medication use Z79.899 V58.69    4. Palpitations R00.2 785.1    5. Sinus bradycardia R00.1 427.89    6. First degree AV block I44.0 426.11    7. Encounter for monitoring flecainide therapy Z51.81 V58.83     Z79.899 V58.69      he does not think he has much PVC and symptoms   In the past we talked about cutting down on the Flecainide dose and stopping it, but the patient still uses it intermittently. I think the first degree AV block is related to his age. Looking back on the EKG with Dr. Clementine Llamas before he even saw me back in 2016, the patient already had first degree AV block getting close to 283 milliseconds. I am concerned that the Flecainide may cause more rapid AV node conduction disease disease and therefore would result in second or third degree AV block. I recommended he stop the Flecainide and see how much PVC's he has.  Otherwise, he would need a pacemaker to continue with medication or if he has symptomatic PVCs he again he can consider PVC ablation  He agrees  He can come back for another ECG in 1 week off flecainide    Future Appointments   Date Time Provider Humaira Ni   5/6/2019  3:00 PM Jessee Kendrick MD [id number] [facility]   8/1/2019  8:20 AM Noa Culver  [address]     Thank you for involving me in this patient's care and please call with further concerns or questions. Sebastian Us M.D.   Electrophysiology/Cardiology  [facility] [place]  [facility] [address] , [address]  [address]  [phone]

## 2019-01-29 NOTE — PROGRESS NOTES
Per Dr.Ngo MONTES to discontinue all medication not taken. Per Dr. Nuvia bourgeois to have EKG when patient needs due to stopping Flecainide.

## 2019-02-18 ENCOUNTER — TELEPHONE (OUTPATIENT)
Dept: CARDIOLOGY CLINIC | Age: 69
End: 2019-02-18

## 2019-02-18 RX ORDER — AMLODIPINE BESYLATE 5 MG/1
5 TABLET ORAL DAILY
Qty: 90 TAB | Refills: 1 | Status: SHIPPED | OUTPATIENT
Start: 2019-02-18 | End: 2019-06-07 | Stop reason: SDUPTHER

## 2019-02-18 NOTE — TELEPHONE ENCOUNTER
Returned patient call. Pt was informed of what Dr. Greg Gallo stated and he stated that he would like to come in to see Dr. Greg Gallo. Pt was scheduled to see him next week. Pt verbalized understanding and denies any further questions at this time.

## 2019-02-18 NOTE — TELEPHONE ENCOUNTER
Requested Prescriptions     Signed Prescriptions Disp Refills    amLODIPine (NORVASC) 5 mg tablet 90 Tab 1     Sig: Take 1 Tab by mouth daily.      Authorizing Provider: Lia Peacock     Ordering User: Mary Izaguirre       Per Dr. Marshal Crenshaw   Date Time Provider Humaira Ni   5/6/2019  3:00 PM Jessee Kendrick MD [address]   8/1/2019  8:20 AM Radames Trimble  [address]

## 2019-02-18 NOTE — TELEPHONE ENCOUNTER
Returned patient call. Two patient indentifiers verified. Pt stated that since he has stopped Flecainide he has had more PVC's. Pt stated that they come and go but are asymptotic. Pt wanted to inform Dr. Dotty Downs and see if he has an recommendation.

## 2019-02-18 NOTE — TELEPHONE ENCOUNTER
Patient states Dr. Alice Landa made some medication changes during his last visit. He was told to stop flecainide and ever since he's stopped he's been having more PVC's. Please advise!      Phone: [phone]    Second contact # : [phone]

## 2019-02-18 NOTE — TELEPHONE ENCOUNTER
Per Dr. Margaret Bermudez last clinic note, he should come in for ECG once he's been off the Flecainide for 1 week. It also mentions in his note that he may have AV conduction disease, would need pacemaker if he wanted to continue medication to suppress PVCs, or could undergo PVC ablation. If he wants to have procedure, he should be scheduled to discuss further with Dr. John Palma.

## 2019-02-22 ENCOUNTER — TELEPHONE (OUTPATIENT)
Dept: CARDIOLOGY CLINIC | Age: 69
End: 2019-02-22

## 2019-02-22 NOTE — TELEPHONE ENCOUNTER
Returned patient call. Two patient indentifiers verified. Pt stated that he wants to make sure that nothing is wrong. Pt stated that he thinks its just muscle pain. Pt stated the pain comes and goes but is not to bad. Pt denies any chest pain, SOB or sweating. Pt was informed if he has crushing chest pain. Sob or sweating to go to the emergency room. Pt verbalized understanding and denies any further questions at this time.

## 2019-02-22 NOTE — TELEPHONE ENCOUNTER
Patient states Dr. Blanquita Cardoso discontinued Flecanide and since then he's been feeling \"PVC sensations\"  He is wondering it would make sense for him to come in and get an EKG done? Please advise!      Phone: [phone]

## 2019-02-26 ENCOUNTER — TELEPHONE (OUTPATIENT)
Dept: CARDIOLOGY CLINIC | Age: 69
End: 2019-02-26

## 2019-02-26 NOTE — TELEPHONE ENCOUNTER
Pt called in with complaints of PVC's. Pt wanted to know if he could just stop by today. Pt was placed on hold and Dr. Mikki Metcalf was asked. Dr. Mikki Metcalf recommend patient restart flecainide 50 mg BID. Pt verbalized understanding and denies any further questions at this time.      Future Appointments   Date Time Provider Humaira Raine   2/28/2019  3:00 PM Kev Dao  [address]   5/6/2019  3:00 PM Ilana Kendrick President, MD [facility]   8/1/2019  8:20 AM Kev Dao  [address]

## 2019-02-28 ENCOUNTER — OFFICE VISIT (OUTPATIENT)
Dept: CARDIOLOGY CLINIC | Age: 69
End: 2019-02-28

## 2019-02-28 VITALS
HEIGHT: 65 IN | RESPIRATION RATE: 12 BRPM | SYSTOLIC BLOOD PRESSURE: 116 MMHG | WEIGHT: 170 LBS | OXYGEN SATURATION: 93 % | HEART RATE: 60 BPM | BODY MASS INDEX: 28.32 KG/M2 | DIASTOLIC BLOOD PRESSURE: 66 MMHG

## 2019-02-28 DIAGNOSIS — Z79.899 ENCOUNTER FOR MONITORING FLECAINIDE THERAPY: ICD-10-CM

## 2019-02-28 DIAGNOSIS — R00.1 SINUS BRADYCARDIA: ICD-10-CM

## 2019-02-28 DIAGNOSIS — Z51.81 ENCOUNTER FOR MONITORING FLECAINIDE THERAPY: ICD-10-CM

## 2019-02-28 DIAGNOSIS — I44.0 FIRST DEGREE AV BLOCK: ICD-10-CM

## 2019-02-28 DIAGNOSIS — R00.2 PALPITATIONS: ICD-10-CM

## 2019-02-28 DIAGNOSIS — I49.3 PVC (PREMATURE VENTRICULAR CONTRACTION): Primary | ICD-10-CM

## 2019-02-28 DIAGNOSIS — I10 ESSENTIAL HYPERTENSION: ICD-10-CM

## 2019-02-28 RX ORDER — FLECAINIDE ACETATE 50 MG/1
50 TABLET ORAL AS NEEDED
Qty: 30 TAB | Refills: 1
Start: 2019-02-28 | End: 2019-04-30

## 2019-02-28 RX ORDER — FLECAINIDE ACETATE 50 MG/1
TABLET ORAL 2 TIMES DAILY
COMMUNITY
End: 2019-02-28

## 2019-03-01 NOTE — PROGRESS NOTES
Cardiac Electrophysiology Office Note     Subjective: Law Mccarthy is a 77 y.o. male patient who is seen for follow up of PVCs   He was kindly referred by Dr. Lacho Juarez. Interim hx:   He is here today because he has felt more palpitation when not taking flecainide  He has taken it again after he called me  He said it is subsided now  Otherwise, he is still very active and has no activity limitation. Past hx:  He reports he has PVCs for a \"long time\". For several years it has been transient and rarely bothered him. The past couple months he has noticed them more and thinks they have been occuring more frequently after spring break trip with 2 children in high school  He reports he feels the sensation of a skipped beat. Associated symptoms include \"queezy feeling\". He started the flecainide when he returned from his trip to [place]. Pmhx includes hyperlipidemia. Exercise stress nuclear test 2016 showed   Good exercise capacity. No ischemia on ECG, PVC, but there are frequent PACs  BP was very high 250/100 with exercise. 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 5 children (all in good health), . Half brother may have a pacemaker.      Stress echo 12 with Dr Duque Flavin  Normal resting electrocardiogram.  Above average functional capacity (>20%).   Appropriate heart rate response to exercise.  Normal resting blood pressure.  Appropriate blood pressure response to exercise. No chest pain.  No arrhythmias.  No ST changes.  Overall impression: Normal stress electrocardiogram.  Resting echo with normal regional and  global contractility.  Post exercise, uniformly enhanced myocardial contractility. Patient Active Problem List    Diagnosis Date Noted    Advanced directives, counseling/discussion 03/17/2016    PVC (premature ventricular contraction) 03/17/2016    Erectile dysfunction 02/24/2016    Encounter for long-term (current) use of other medications 02/23/2015    Unspecified vitamin D deficiency 02/23/2015    Attention deficit disorder without mention of hyperactivity 09/30/2014    Proteinuria 07/31/2014    Elevated blood pressure reading without diagnosis of hypertension 01/30/2014    Tinnitus of both ears 07/30/2013    Urticaria 07/30/2013    Calculus of kidney 07/30/2013    Insomnia, unspecified 07/30/2013    Mitral prolapse 02/07/2012    Hypercholesteremia 02/07/2012     Current Outpatient Medications   Medication Sig Dispense Refill    flecainide (TAMBOCOR) 50 mg tablet Take 1 Tab by mouth as needed (PVC's). 30 Tab 1    amLODIPine (NORVASC) 5 mg tablet Take 1 Tab by mouth daily. 90 Tab 1    FINASTERIDE PO Take  by mouth.  atorvastatin (LIPITOR) 20 mg tablet Take 1 Tab by mouth daily. 90 Tab 1    sildenafil citrate (VIAGRA) 100 mg tablet Take 1 Tab by mouth as needed. Fill as generic. 8 Tab 0    tamsulosin (FLOMAX) 0.4 mg capsule Take 0.4 mg by mouth daily.  aspirin delayed-release 81 mg tablet Take  by mouth daily.        No Known Allergies  Past Medical History:   Diagnosis Date    Calculus of kidney 2011    Depression     Hypercholesterolemia     Hyperlipidemia     Kidney stone     Mitral valve prolapse     Polycystic liver disease      Past Surgical History:   Procedure Laterality Date    ENDOSCOPY, COLON, DIAGNOSTIC  2005    polyp- dr Jazmin Miller, 2012, due 14    HX HEENT      uvulectomy    HX VASECTOMY  2002     Family History   Problem Relation Age of Onset    Heart Disease Mother     Cancer Mother     Stroke Mother     Stroke Father     No Known Problems Sister     No Known Problems Brother      Social History     Tobacco Use    Smoking status: Former Smoker     Packs/day: 1.00     Years: 12.00     Pack years: 12.00     Types: Cigarettes    Smokeless tobacco: Never Used   Substance Use Topics    Alcohol use: Yes     Comment: wine 1-2 glasse per day         Review of Systems:   Constitutional: Negative for fever, chills, weight loss, malaise/fatigue. HEENT: Negative for nosebleeds, vision changes. Respiratory: Negative for cough, hemoptysis, sputum production, and wheezing. Cardiovascular: Negative for chest pain, + episodic palpitations, no orthopnea, claudication, leg swelling, syncope, and PND. Gastrointestinal: Negative for nausea, vomiting, diarrhea, constipation, blood in stool and melena. Genitourinary: Negative for dysuria, and hematuria. Musculoskeletal: Negative for myalgias, arthralgia. Skin: Negative for rash. Heme: Does not bleed or bruise easily. Neurological: Negative for speech change and focal weakness     Objective:     Visit Vitals  /66 (BP 1 Location: Left arm, BP Patient Position: Sitting)   Pulse 60   Resp 12   Ht 5' 5\" (1.651 m)   Wt 170 lb (77.1 kg)   SpO2 93%   BMI 28.29 kg/m²      Physical Exam:   Constitutional: well-developed and well-nourished. No distress. Head: Normocephalic and atraumatic. Eyes: Pupils are equal, round  Neck: supple. No JVD present. Cardiovascular: regular rhythm and normal heart sounds. Exam reveals no gallop and no friction rub. No murmur heard. Pulmonary/Chest: Effort normal and breath sounds normal. No wheezes. Abdominal: Soft, no tenderness. Musculoskeletal: no edema. full ROM  Neurological: alert, oriented. Skin: Skin is warm and dry  Psychiatric: normal mood and affect. Behavior is normal. Judgment and thought content normal.      EKG: Sinus  Bradycardia  -First degree A-V block   -Left axis deviation      Assessment/Plan:       ICD-10-CM ICD-9-CM    1. PVC (premature ventricular contraction) I49.3 427.69 AMB POC EKG ROUTINE W/ 12 LEADS, INTER & REP   2. First degree AV block I44.0 426.11    3. Palpitations R00.2 785.1    4. Essential hypertension I10 401.9    5. Sinus bradycardia R00.1 427.89    6. Encounter for monitoring flecainide therapy Z51.81 V58.83     Z79.899 V58.69    he is symptomatic with PVCs when not having flecainide   I think the first degree AV block is related to his age. Looking back on the EKG with Dr. Chase Rico before he even saw me back in 2016, the patient already had first degree AV block . I am concerned that the Flecainide may cause more rapid AV node conduction disease disease and therefore would result in second or third degree AV block. I recommended he stop the Flecainide after it controls his palpitation but if this does not help as PRN/pill in pocket then I recommend dual chamber pacemaker to continue use drug safely or consider EP study and ablation of PVC. He agrees but wants to explore second opinion  I gave him Dr Lashon Duff name  He said he will call me back if he wants to consider procedure    Future Appointments   Date Time Provider \[facility]\""   5/6/2019  3:00 PM Fabián Montaño MD [id number] [facility]   8/1/2019  8:20 AM Jaxson Smyth  [address]     Thank you for involving me in this patient's care and please call with further concerns or questions. Arina Miranda M.D.   Electrophysiology/Cardiology  [facility] [place]  [address], [address]  [place], [address] [facility] [address]  [phone]                                        [phone]

## 2019-03-07 DIAGNOSIS — E78.00 HYPERCHOLESTEREMIA: ICD-10-CM

## 2019-03-11 RX ORDER — ATORVASTATIN CALCIUM 20 MG/1
20 TABLET, FILM COATED ORAL DAILY
Qty: 90 TAB | Refills: 0 | Status: SHIPPED | OUTPATIENT
Start: 2019-03-11 | End: 2019-06-04

## 2019-03-11 NOTE — TELEPHONE ENCOUNTER
PCP: Abdulaziz Krishnamurthy MD    Last appt: 5/2/2018  Future Appointments   Date Time Provider Humaira Ni   5/6/2019  3:00 PM Mahendra Kendrick MD [facility]   8/1/2019  8:20 AM Debbie Salas  [address]       Requested Prescriptions     Pending Prescriptions Disp Refills    atorvastatin (LIPITOR) 20 mg tablet 90 Tab 1     Sig: Take 1 Tab by mouth daily.

## 2019-03-11 NOTE — TELEPHONE ENCOUNTER
Medication Renewal Request   Received: 4 days ago   Message Contents   Tata Osborne [facility] Nurses Pool   Phone Number: [phone]             ----- Message from [address] [place] [address] [address], Generic sent at 3/7/2019 11:34 PM EST -----     Mag Luz Maria would like a refill of the following medications:         atorvastatin (LIPITOR) 20 mg tablet Jeffrey Goodson MD]     Preferred pharmacy: [facility], [facility]

## 2019-04-10 ENCOUNTER — TELEPHONE (OUTPATIENT)
Dept: CARDIOLOGY CLINIC | Age: 69
End: 2019-04-10

## 2019-04-10 NOTE — TELEPHONE ENCOUNTER
Verified patient with two types of identifiers. Patient states that he has been having increased palpitations recently. Patient has been taking Flecainide as needed and says that it had been helping but today has been a \"tough day. \" Notified patient that per Dr. Lakisha Jaramillo last office note the next step would be to discuss either a PPM or a PVC ablation. Patient states that he has an upcoming appointment with another MD for a second opinion on 4/17/19. Patient requesting an appointment with Dr. Carine Cook following that appointment to discuss options and plan of care. Appointment scheduled for 4/30/19 at 8:20 am. Patient verbalized understanding and will call with any other questions.

## 2019-04-30 ENCOUNTER — OFFICE VISIT (OUTPATIENT)
Dept: CARDIOLOGY CLINIC | Age: 69
End: 2019-04-30

## 2019-04-30 VITALS
HEIGHT: 65 IN | WEIGHT: 165 LBS | RESPIRATION RATE: 14 BRPM | DIASTOLIC BLOOD PRESSURE: 70 MMHG | OXYGEN SATURATION: 98 % | SYSTOLIC BLOOD PRESSURE: 118 MMHG | BODY MASS INDEX: 27.49 KG/M2 | HEART RATE: 53 BPM

## 2019-04-30 DIAGNOSIS — I10 ESSENTIAL HYPERTENSION: ICD-10-CM

## 2019-04-30 DIAGNOSIS — Z79.899 ENCOUNTER FOR MONITORING FLECAINIDE THERAPY: ICD-10-CM

## 2019-04-30 DIAGNOSIS — Z51.81 ENCOUNTER FOR MONITORING FLECAINIDE THERAPY: ICD-10-CM

## 2019-04-30 DIAGNOSIS — R00.2 PALPITATIONS: ICD-10-CM

## 2019-04-30 DIAGNOSIS — I44.0 FIRST DEGREE AV BLOCK: ICD-10-CM

## 2019-04-30 DIAGNOSIS — I49.3 PVC (PREMATURE VENTRICULAR CONTRACTION): Primary | ICD-10-CM

## 2019-04-30 RX ORDER — FLECAINIDE ACETATE 50 MG/1
25 TABLET ORAL 2 TIMES DAILY
Qty: 90 TAB | Refills: 1
Start: 2019-04-30 | End: 2019-06-04

## 2019-04-30 NOTE — PROGRESS NOTES
Cardiac Electrophysiology Office Note     Subjective: Madelyn Matias is a 77 y.o. male patient who is seen for follow up of PVCs   He was kindly referred by Dr. Scott Granados. Interim hx:   He is here today because he has felt more palpitation when not taking flecainide  He is taking PRN 50 mg   He is not as active as he used to be  Intermittent quick chest pain but not with walking or exercise    Past hx:  He reports he has PVCs for a \"long time\". For several years it has been transient and rarely bothered him. The past couple months he has noticed them more and thinks they have been occuring more frequently after spring break trip with 2 children in high school  He reports he feels the sensation of a skipped beat. Associated symptoms include \"queezy feeling\". He started the flecainide when he returned from his trip to [place]. Pmhx includes hyperlipidemia. Then hypertension    Exercise stress nuclear test 2016 showed  Good exercise capacity. No ischemia on ECG, PVC, but there are frequent PACs  BP was very high 250/100 with exercise. 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 5 children (all in good health), . Half brother may have a pacemaker.      Stress echo 12 with Dr Onur Mata Normal resting electrocardiogram.  Above average functional capacity (>20%).   Appropriate heart rate response to exercise.  Normal resting blood pressure.  Appropriate blood pressure response to exercise. No chest pain.  No arrhythmias.  No ST changes.  Overall impression: Normal stress electrocardiogram.  Resting echo with normal regional and  global contractility.  Post exercise, uniformly enhanced myocardial contractility. Patient Active Problem List    Diagnosis Date Noted    Advanced directives, counseling/discussion 03/17/2016    PVC (premature ventricular contraction) 03/17/2016    Erectile dysfunction 02/24/2016    Encounter for long-term (current) use of other medications 02/23/2015    Unspecified vitamin D deficiency 02/23/2015    Attention deficit disorder without mention of hyperactivity 09/30/2014    Proteinuria 07/31/2014    Elevated blood pressure reading without diagnosis of hypertension 01/30/2014    Tinnitus of both ears 07/30/2013    Urticaria 07/30/2013    Calculus of kidney 07/30/2013    Insomnia, unspecified 07/30/2013    Mitral prolapse 02/07/2012    Hypercholesteremia 02/07/2012     Current Outpatient Medications   Medication Sig Dispense Refill    atorvastatin (LIPITOR) 20 mg tablet Take 1 Tab by mouth daily. 90 Tab 0    flecainide (TAMBOCOR) 50 mg tablet Take 1 Tab by mouth as needed (PVC's). 30 Tab 1    amLODIPine (NORVASC) 5 mg tablet Take 1 Tab by mouth daily. 90 Tab 1    FINASTERIDE PO Take  by mouth.  sildenafil citrate (VIAGRA) 100 mg tablet Take 1 Tab by mouth as needed. Fill as generic. 8 Tab 0    tamsulosin (FLOMAX) 0.4 mg capsule Take 0.4 mg by mouth daily.  aspirin delayed-release 81 mg tablet Take  by mouth daily.        No Known Allergies  Past Medical History:   Diagnosis Date    Calculus of kidney 2011    Depression     Hypercholesterolemia     Hyperlipidemia     Kidney stone     Mitral valve prolapse     Polycystic liver disease Past Surgical History:   Procedure Laterality Date    ENDOSCOPY, COLON, DIAGNOSTIC  2005    polyp- dr Anna Baker, 2012, due 16    HX HEENT      uvulectomy    HX VASECTOMY  2002     Family History   Problem Relation Age of Onset    Heart Disease Mother     Cancer Mother     Stroke Mother     Stroke Father     No Known Problems Sister     No Known Problems Brother      Social History     Tobacco Use    Smoking status: Former Smoker     Packs/day: 1.00     Years: 12.00     Pack years: 12.00     Types: Cigarettes    Smokeless tobacco: Never Used   Substance Use Topics    Alcohol use: Yes     Comment: wine 1-2 glasse per day         Review of Systems:   Constitutional: Negative for fever, chills, weight loss, malaise/fatigue. HEENT: Negative for nosebleeds, vision changes. Respiratory: Negative for cough, hemoptysis, sputum production, and wheezing. Cardiovascular:  + episodic palpitations, no orthopnea, claudication, leg swelling, syncope, and PND. Gastrointestinal: Negative for nausea, vomiting, diarrhea, constipation, blood in stool and melena. Genitourinary: Negative for dysuria, and hematuria. Musculoskeletal: Negative for myalgias, arthralgia. Skin: Negative for rash. Heme: Does not bleed or bruise easily. Neurological: Negative for speech change and focal weakness     Objective:     Visit Vitals  /70 (BP 1 Location: Left arm, BP Patient Position: Sitting)   Pulse (!) 53   Resp 14   Ht 5' 5\" (1.651 m)   Wt 165 lb (74.8 kg)   SpO2 98%   BMI 27.46 kg/m²      Physical Exam:   Constitutional: well-developed and well-nourished. No distress. Head: Normocephalic and atraumatic. Eyes: Pupils are equal, round  Neck: supple. No JVD present. Cardiovascular: regular rhythm and normal heart sounds. Exam reveals no gallop and no friction rub. No murmur heard. Pulmonary/Chest: Effort normal and breath sounds normal. No wheezes. Abdominal: Soft, no tenderness.    Musculoskeletal: no edema. full ROM  Neurological: alert, oriented. Skin: Skin is warm and dry  Psychiatric: normal mood and affect. Behavior is normal. Judgment and thought content normal.         Assessment/Plan:       ICD-10-CM ICD-9-CM    1. PVC (premature ventricular contraction) I49.3 427.69    2. First degree AV block I44.0 426.11    3. Palpitations R00.2 785.1    4. Essential hypertension I10 401.9    5. Encounter for monitoring flecainide therapy Z51.81 V58.83     Z79.899 V58.69    he is symptomatic with PVCs when not having flecainide  I think the first degree AV block is related to his age. Looking back on the EKG with Dr. Trisha Barfield before he even saw me back in 2016, the patient already had first degree AV block . I am concerned that the Flecainide may cause more rapid AV node conduction disease disease and therefore would result in second or third degree AV block. I recommended he cut the Flecainide to 25 mg bid but if this does not help then I recommend dual chamber pacemaker to continue use drug safely or consider EP study and ablation of PVC. Future Appointments   Date Time Provider Humaira Raine   5/6/2019  3:00 PM Bernarda Kendrick MD [id number] [facility]   8/1/2019  8:20 AM Kory Knox  [address]     Thank you for involving me in this patient's care and please call with further concerns or questions. Jaime Franco M.D.   Electrophysiology/Cardiology  [facility] [place]  [address], [address], [address]  [facility], [address]                             [address] [place] [address]  [phone]

## 2019-05-06 ENCOUNTER — OFFICE VISIT (OUTPATIENT)
Dept: INTERNAL MEDICINE CLINIC | Age: 69
End: 2019-05-06

## 2019-05-06 VITALS
DIASTOLIC BLOOD PRESSURE: 68 MMHG | HEART RATE: 58 BPM | SYSTOLIC BLOOD PRESSURE: 110 MMHG | HEIGHT: 65 IN | BODY MASS INDEX: 27.66 KG/M2 | RESPIRATION RATE: 18 BRPM | WEIGHT: 166 LBS | TEMPERATURE: 97.8 F | OXYGEN SATURATION: 96 %

## 2019-05-06 DIAGNOSIS — Z00.00 ROUTINE GENERAL MEDICAL EXAMINATION AT A HEALTH CARE FACILITY: Primary | ICD-10-CM

## 2019-05-06 DIAGNOSIS — E78.00 HYPERCHOLESTEREMIA: ICD-10-CM

## 2019-05-06 DIAGNOSIS — Z12.11 SCREEN FOR COLON CANCER: ICD-10-CM

## 2019-05-09 LAB
ALBUMIN SERPL-MCNC: 4.2 G/DL (ref 3.5–4.8)
ALBUMIN/GLOB SERPL: 2.2 {RATIO} (ref 1.2–2.2)
ALP SERPL-CCNC: 98 IU/L (ref 39–117)
ALT SERPL-CCNC: 32 IU/L (ref 0–44)
APPEARANCE UR: CLEAR
AST SERPL-CCNC: 30 IU/L (ref 0–40)
BACTERIA #/AREA URNS HPF: NORMAL /[HPF]
BASOPHILS # BLD AUTO: 0 X10E3/UL (ref 0–0.2)
BASOPHILS NFR BLD AUTO: 1 %
BILIRUB SERPL-MCNC: 0.7 MG/DL (ref 0–1.2)
BILIRUB UR QL STRIP: NEGATIVE
BUN SERPL-MCNC: 11 MG/DL (ref 8–27)
BUN/CREAT SERPL: 12 (ref 10–24)
CALCIUM SERPL-MCNC: 9.4 MG/DL (ref 8.6–10.2)
CASTS URNS QL MICRO: NORMAL /LPF
CHLORIDE SERPL-SCNC: 104 MMOL/L (ref 96–106)
CHOLEST SERPL-MCNC: 145 MG/DL (ref 100–199)
CO2 SERPL-SCNC: 25 MMOL/L (ref 20–29)
COLOR UR: YELLOW
CREAT SERPL-MCNC: 0.93 MG/DL (ref 0.76–1.27)
EOSINOPHIL # BLD AUTO: 0.1 X10E3/UL (ref 0–0.4)
EOSINOPHIL NFR BLD AUTO: 3 %
EPI CELLS #/AREA URNS HPF: NORMAL /HPF (ref 0–10)
ERYTHROCYTE [DISTWIDTH] IN BLOOD BY AUTOMATED COUNT: 13.4 % (ref 12.3–15.4)
GLOBULIN SER CALC-MCNC: 1.9 G/DL (ref 1.5–4.5)
GLUCOSE SERPL-MCNC: 92 MG/DL (ref 65–99)
GLUCOSE UR QL: NEGATIVE
HCT VFR BLD AUTO: 45.6 % (ref 37.5–51)
HDLC SERPL-MCNC: 44 MG/DL
HGB BLD-MCNC: 15.6 G/DL (ref 13–17.7)
HGB UR QL STRIP: NEGATIVE
IMM GRANULOCYTES # BLD AUTO: 0 X10E3/UL (ref 0–0.1)
IMM GRANULOCYTES NFR BLD AUTO: 0 %
KETONES UR QL STRIP: NEGATIVE
LDLC SERPL CALC-MCNC: 73 MG/DL (ref 0–99)
LEUKOCYTE ESTERASE UR QL STRIP: NEGATIVE
LYMPHOCYTES # BLD AUTO: 0.9 X10E3/UL (ref 0.7–3.1)
LYMPHOCYTES NFR BLD AUTO: 17 %
MCH RBC QN AUTO: 31.6 PG (ref 26.6–33)
MCHC RBC AUTO-ENTMCNC: 34.2 G/DL (ref 31.5–35.7)
MCV RBC AUTO: 93 FL (ref 79–97)
MICRO URNS: NORMAL
MICRO URNS: NORMAL
MONOCYTES # BLD AUTO: 0.6 X10E3/UL (ref 0.1–0.9)
MONOCYTES NFR BLD AUTO: 10 %
MUCOUS THREADS URNS QL MICRO: PRESENT
NEUTROPHILS # BLD AUTO: 3.8 X10E3/UL (ref 1.4–7)
NEUTROPHILS NFR BLD AUTO: 69 %
NITRITE UR QL STRIP: NEGATIVE
PH UR STRIP: 6.5 [PH] (ref 5–7.5)
PLATELET # BLD AUTO: 200 X10E3/UL (ref 150–379)
POTASSIUM SERPL-SCNC: 4.5 MMOL/L (ref 3.5–5.2)
PROT SERPL-MCNC: 6.1 G/DL (ref 6–8.5)
PROT UR QL STRIP: NEGATIVE
PSA SERPL-MCNC: 1.2 NG/ML (ref 0–4)
RBC # BLD AUTO: 4.93 X10E6/UL (ref 4.14–5.8)
RBC #/AREA URNS HPF: NORMAL /HPF (ref 0–2)
SODIUM SERPL-SCNC: 143 MMOL/L (ref 134–144)
SP GR UR: 1.02 (ref 1–1.03)
TRIGL SERPL-MCNC: 138 MG/DL (ref 0–149)
TSH SERPL DL<=0.005 MIU/L-ACNC: 3.23 UIU/ML (ref 0.45–4.5)
URINALYSIS REFLEX, 377202: NORMAL
UROBILINOGEN UR STRIP-MCNC: 0.2 MG/DL (ref 0.2–1)
VLDLC SERPL CALC-MCNC: 28 MG/DL (ref 5–40)
WBC # BLD AUTO: 5.5 X10E3/UL (ref 3.4–10.8)
WBC #/AREA URNS HPF: NORMAL /HPF (ref 0–5)

## 2019-05-10 ENCOUNTER — OFFICE VISIT (OUTPATIENT)
Dept: INTERNAL MEDICINE CLINIC | Age: 69
End: 2019-05-10

## 2019-05-10 VITALS
TEMPERATURE: 98 F | SYSTOLIC BLOOD PRESSURE: 110 MMHG | HEART RATE: 54 BPM | OXYGEN SATURATION: 98 % | RESPIRATION RATE: 18 BRPM | WEIGHT: 166.2 LBS | BODY MASS INDEX: 27.69 KG/M2 | HEIGHT: 65 IN | DIASTOLIC BLOOD PRESSURE: 78 MMHG

## 2019-05-10 DIAGNOSIS — S01.311D: Primary | ICD-10-CM

## 2019-05-10 DIAGNOSIS — E78.00 HYPERCHOLESTEREMIA: ICD-10-CM

## 2019-05-10 NOTE — PROGRESS NOTES
HISTORY OF PRESENT ILLNESS  Vikash Watkins is a 77 y.o. male. Wound Check   The history is provided by the patient (ear sutures due for removal.). This is a new problem. The current episode started more than 1 week ago. Cholesterol Problem   The history is provided by the patient (reviewed labs, look great). Review of Systems   Constitutional: Negative for chills and fever. Physical Exam   HENT:   Ears:    Neurological: He is alert. Skin: Skin is warm and dry. Psychiatric: He has a normal mood and affect. His behavior is normal.   Nursing note and vitals reviewed. ASSESSMENT and PLAN  Diagnoses and all orders for this visit:    1. Laceration of antihelix of right ear, subsequent encounter  -     REMOVAL OF SUTURES, reviewed wound care    2. Hypercholesteremia- Check lipid panel and appropriate studies to rule out med side effects yearly.

## 2019-05-14 LAB — HEMOCCULT STL QL IA: POSITIVE

## 2019-05-26 ENCOUNTER — TELEPHONE (OUTPATIENT)
Dept: INTERNAL MEDICINE CLINIC | Age: 69
End: 2019-05-26

## 2019-05-29 ENCOUNTER — TELEPHONE (OUTPATIENT)
Dept: CARDIOLOGY CLINIC | Age: 69
End: 2019-05-29

## 2019-05-29 NOTE — TELEPHONE ENCOUNTER
Per Dr. Madina Hall it is subsided then he does not need holter but if it is not then I recommend it to consider ablation\"    Mychart message sent with MD rashid

## 2019-05-29 NOTE — TELEPHONE ENCOUNTER
Regarding: FW: Non-Urgent Medical Question  Contact: [phone]      ----- Message -----  From: Rain Rogel  Sent: 5/28/2019   4:39 PM  To: [facility] Nurse Pool  Subject: Non-Urgent Medical Question                      ----- Message from [address] [place] [address] [address], Generic sent at 5/28/2019  4:39 PM EDT -----    Hi Dr. Maykel Luna:    I hope that you are well. I'm having some increased PVCs and plan to up my Flecanide from 25 mg. back to 50 for a few days starting this evening to see if that will settle things down. I'll keep you posted. If you want me to do anything else, please let me know. Also, would it make sense for me to do another Holter test at some point?     Raman Morocho

## 2019-06-04 RX ORDER — FLECAINIDE ACETATE 50 MG/1
50 TABLET ORAL EVERY EVENING
COMMUNITY
End: 2019-06-07 | Stop reason: SDUPTHER

## 2019-06-04 RX ORDER — ATORVASTATIN CALCIUM 20 MG/1
20 TABLET, FILM COATED ORAL EVERY EVENING
COMMUNITY
End: 2019-06-07 | Stop reason: SDUPTHER

## 2019-06-04 RX ORDER — FINASTERIDE 5 MG/1
5 TABLET, FILM COATED ORAL EVERY EVENING
COMMUNITY

## 2019-06-04 RX ORDER — FLECAINIDE ACETATE 50 MG/1
25 TABLET ORAL
COMMUNITY
End: 2019-10-03

## 2019-06-05 ENCOUNTER — ANESTHESIA EVENT (OUTPATIENT)
Dept: ENDOSCOPY | Age: 69
End: 2019-06-05
Payer: COMMERCIAL

## 2019-06-05 ENCOUNTER — HOSPITAL ENCOUNTER (OUTPATIENT)
Age: 69
Setting detail: OUTPATIENT SURGERY
Discharge: HOME | End: 2019-06-05
Attending: INTERNAL MEDICINE | Admitting: INTERNAL MEDICINE
Payer: COMMERCIAL

## 2019-06-05 ENCOUNTER — ANESTHESIA (OUTPATIENT)
Dept: ENDOSCOPY | Age: 69
End: 2019-06-05
Payer: COMMERCIAL

## 2019-06-05 VITALS
HEART RATE: 60 BPM | BODY MASS INDEX: 26.96 KG/M2 | RESPIRATION RATE: 14 BRPM | DIASTOLIC BLOOD PRESSURE: 75 MMHG | SYSTOLIC BLOOD PRESSURE: 116 MMHG | WEIGHT: 162 LBS | OXYGEN SATURATION: 96 % | TEMPERATURE: 97.6 F

## 2019-06-05 PROCEDURE — 74011250636 HC RX REV CODE- 250/636

## 2019-06-05 PROCEDURE — 76060000032 HC ANESTHESIA 0.5 TO 1 HR: Performed by: INTERNAL MEDICINE

## 2019-06-05 PROCEDURE — 77030027957 HC TBNG IRR ENDOGTR BUSS -B: Performed by: INTERNAL MEDICINE

## 2019-06-05 PROCEDURE — 88305 TISSUE EXAM BY PATHOLOGIST: CPT

## 2019-06-05 PROCEDURE — 76040000007: Performed by: INTERNAL MEDICINE

## 2019-06-05 PROCEDURE — 77030036658 HC PRB COAG AR STR FIRE GENI -C: Performed by: INTERNAL MEDICINE

## 2019-06-05 PROCEDURE — 77030013992 HC SNR POLYP ENDOSC BSC -B: Performed by: INTERNAL MEDICINE

## 2019-06-05 PROCEDURE — 77030036656: Performed by: INTERNAL MEDICINE

## 2019-06-05 RX ORDER — FLUMAZENIL 0.1 MG/ML
0.2 INJECTION INTRAVENOUS
Status: DISCONTINUED | OUTPATIENT
Start: 2019-06-05 | End: 2019-06-05 | Stop reason: HOSPADM

## 2019-06-05 RX ORDER — SODIUM CHLORIDE 9 MG/ML
INJECTION, SOLUTION INTRAVENOUS
Status: DISCONTINUED | OUTPATIENT
Start: 2019-06-05 | End: 2019-06-05 | Stop reason: HOSPADM

## 2019-06-05 RX ORDER — PROPOFOL 10 MG/ML
INJECTION, EMULSION INTRAVENOUS AS NEEDED
Status: DISCONTINUED | OUTPATIENT
Start: 2019-06-05 | End: 2019-06-05 | Stop reason: HOSPADM

## 2019-06-05 RX ORDER — SODIUM CHLORIDE 9 MG/ML
50 INJECTION, SOLUTION INTRAVENOUS CONTINUOUS
Status: DISCONTINUED | OUTPATIENT
Start: 2019-06-05 | End: 2019-06-05 | Stop reason: HOSPADM

## 2019-06-05 RX ORDER — SODIUM CHLORIDE 0.9 % (FLUSH) 0.9 %
5-40 SYRINGE (ML) INJECTION AS NEEDED
Status: DISCONTINUED | OUTPATIENT
Start: 2019-06-05 | End: 2019-06-05 | Stop reason: HOSPADM

## 2019-06-05 RX ORDER — DEXTROMETHORPHAN/PSEUDOEPHED 2.5-7.5/.8
1.2 DROPS ORAL
Status: DISCONTINUED | OUTPATIENT
Start: 2019-06-05 | End: 2019-06-05 | Stop reason: HOSPADM

## 2019-06-05 RX ORDER — EPINEPHRINE 0.1 MG/ML
1 INJECTION INTRACARDIAC; INTRAVENOUS
Status: DISCONTINUED | OUTPATIENT
Start: 2019-06-05 | End: 2019-06-05 | Stop reason: HOSPADM

## 2019-06-05 RX ORDER — SODIUM CHLORIDE 0.9 % (FLUSH) 0.9 %
5-40 SYRINGE (ML) INJECTION EVERY 8 HOURS
Status: DISCONTINUED | OUTPATIENT
Start: 2019-06-05 | End: 2019-06-05 | Stop reason: HOSPADM

## 2019-06-05 RX ORDER — ATROPINE SULFATE 0.1 MG/ML
0.5 INJECTION INTRAVENOUS
Status: DISCONTINUED | OUTPATIENT
Start: 2019-06-05 | End: 2019-06-05 | Stop reason: HOSPADM

## 2019-06-05 RX ORDER — NALOXONE HYDROCHLORIDE 0.4 MG/ML
0.4 INJECTION, SOLUTION INTRAMUSCULAR; INTRAVENOUS; SUBCUTANEOUS
Status: DISCONTINUED | OUTPATIENT
Start: 2019-06-05 | End: 2019-06-05 | Stop reason: HOSPADM

## 2019-06-05 RX ADMIN — PROPOFOL 100 MG: 10 INJECTION, EMULSION INTRAVENOUS at 07:45

## 2019-06-05 RX ADMIN — PROPOFOL 50 MG: 10 INJECTION, EMULSION INTRAVENOUS at 07:47

## 2019-06-05 RX ADMIN — PROPOFOL 50 MG: 10 INJECTION, EMULSION INTRAVENOUS at 07:53

## 2019-06-05 RX ADMIN — SODIUM CHLORIDE: 9 INJECTION, SOLUTION INTRAVENOUS at 07:26

## 2019-06-05 RX ADMIN — PROPOFOL 50 MG: 10 INJECTION, EMULSION INTRAVENOUS at 07:56

## 2019-06-05 RX ADMIN — PROPOFOL 50 MG: 10 INJECTION, EMULSION INTRAVENOUS at 07:59

## 2019-06-05 RX ADMIN — PROPOFOL 50 MG: 10 INJECTION, EMULSION INTRAVENOUS at 08:02

## 2019-06-05 RX ADMIN — PROPOFOL 50 MG: 10 INJECTION, EMULSION INTRAVENOUS at 07:50

## 2019-06-05 NOTE — DISCHARGE INSTRUCTIONS
[address]   [address], [address]  [address]  [id number]  1950    COLON DISCHARGE INSTRUCTIONS    DISCOMFORT:  Redness at IV site- apply warm compress to area; if redness or soreness persist- contact your physician  There may be a slight amount of blood passed from the rectum  Gaseous discomfort- walking, belching will help relieve any discomfort  You may not operate a vehicle for 12 hours  You may not  engage in an occupation involving machinery or appliances for rest of today  You may not  drink alcoholic beverages for at least 12 hours  Avoid making any critical decisions for at least 24 hour    DIET:   High fiber diet. - however -  remember your colon is empty and a heavy meal will produce gas. Avoid these foods:  vegetables, fried / greasy foods, carbonated drinks for today     ACTIVITY:  It is recommended that you spend the remainder of the day resting -  avoid any strenuous activity. CALL M.D. ANY SIGN OF:   Increasing pain, nausea, vomiting  Abdominal distension (swelling)  New increased bleeding (oral or rectal)  Fever (chills)  Pain in chest area  Bloody discharge from nose or mouth  Shortness of breath    You may not  take any Advil, Aspirin, Ibuprofen, Motrin, Aleve, or Goodys for 7 days, ONLY  Tylenol as needed for pain. Post procedure diagnosis: 1. AVM  2. polyps  3. internal hemorrhoids      Follow-up Instructions:    Call Dr. Alex Bartholomew for any questions or problems. If we took a biopsy please call the office within 2 weeks to discuss your  pathology results.  Telephone # [phone]

## 2019-06-05 NOTE — PROGRESS NOTES

## 2019-06-05 NOTE — PROCEDURES
[address] [facility].  [address] [place] [address] [address], [address]  [phone]                              Colonoscopy Procedure Note      Indications:    Occult blood in stool     :  Isac Betancourt MD    Surgical Assistant: None    Implants: none    Referring Provider: Suzie Duncan MD    Sedation:  MAC anesthesia    Procedure Details:  After informed consent was obtained with all risks and benefits of procedure explained and preoperative exam completed, the patient was taken to the endoscopy suite and placed in the left lateral decubitus position. Upon sequential sedation as per above, a digital rectal exam was performed  And was normal.  The Olympus videocolonoscope  was inserted in the rectum and carefully advanced to the cecum, which was identified by the ileocecal valve and appendiceal orifice. The quality of preparation was good. The colonoscope was slowly withdrawn with careful evaluation between folds. Retroflexion in the rectum was performed and was normal..     Findings:   Rectum: no mucosal lesion appreciated  Grade 2 internal hemorrhoid(s); Sigmoid: no mucosal lesion appreciated  Descending Colon: no mucosal lesion appreciated  Transverse Colon: no mucosal lesion appreciated  Ascending Colon: 2  Sessile polyp(s), the largest 6 mm in size;  Cecum: angioectasia with oozing were noted in the cecum; Terminal Ileum: not intubated    Interventions:  2 complete polypectomy were performed using hot snare  and the polyps were  retrieved  APC was performed at [address] and 1L/min in cecum successfully    Specimen Removed:    ID Type Source Tests Collected by Time Destination   1 : ascending colon polyps Preservative Colon, Ascending  Leann Hill MD 6/5/2019 8449 Pathology       Complications: None. EBL:  None. Recommendations:   -Await pathology. -Repeat colonoscopy in 3 years.  -High fiber diet.     -Resume normal medication(s).   -NO aspirin for 7 days Discharge Disposition:  Home in the company of a  when able to ambulate.     Dennis Pacheco MD  6/5/2019  8:09 AM

## 2019-06-05 NOTE — ANESTHESIA PREPROCEDURE EVALUATION
Relevant Problems   No relevant active problems       Anesthetic History               Review of Systems / Medical History  Patient summary reviewed, nursing notes reviewed and pertinent labs reviewed    Pulmonary        Sleep apnea           Neuro/Psych         Psychiatric history     Cardiovascular    Hypertension        Dysrhythmias : PVC      Exercise tolerance: >4 METS     GI/Hepatic/Renal                Endo/Other             Other Findings              Physical Exam    Airway  Mallampati: I  TM Distance: > 6 cm  Neck ROM: normal range of motion   Mouth opening: Normal     Cardiovascular    Rhythm: regular  Rate: normal         Dental  No notable dental hx       Pulmonary  Breath sounds clear to auscultation               Abdominal         Other Findings            Anesthetic Plan    ASA: 3  Anesthesia type: MAC          Induction: Intravenous  Anesthetic plan and risks discussed with: Patient

## 2019-06-05 NOTE — ROUTINE PROCESS
Marylee Nova [place]  1950  [id number]    Situation:  Verbal report received from: Paulette Allred RN  Procedure: Procedure(s):  COLONOSCOPY  ENDOSCOPIC ARGON PLASMA COAGULATION  ENDOSCOPIC POLYPECTOMY    Background:    Preoperative diagnosis: Blood in stool  Postoperative diagnosis: 1. AVM  2. polyps  3.  internal hemorrhoids    :  Dr. Eddie Villasenor  Assistant(s): Endoscopy Technician-1: Johann Santa  Endoscopy RN-1: Suzy Miller RN    Specimens:   ID Type Source Tests Collected by Time Destination   1 : ascending colon polyps Preservative Colon, Ascending  Jason Barahona MD 6/5/2019 6433 Pathology     H. Pylori  no    Assessment:  Intra-procedure medications        Anesthesia gave intra-procedure sedation and medications, see anesthesia flow sheet yes    Intravenous fluids: NS@ KVO     Vital signs stable   Abdominal assessment: round and soft     Recommendation:  Discharge patient per MD order  Family or Friend Pt son  Permission to share finding with family or friend yes

## 2019-06-05 NOTE — H&P
[address]  [address] [facility] [address] [facility], [address]  [phone]                                History and Physical     NAME: Be Hale   :  1950   MRN:  [id number]     HPI:  The patient was seen and examined. Past Surgical History:   Procedure Laterality Date    ENDOSCOPY, COLON, DIAGNOSTIC      polyp- dr Kevon Johnson, , 2017    HX HEENT      uvulectomy    HX HEENT      wisdom teeth removed    HX VASECTOMY       Past Medical History:   Diagnosis Date    Arrhythmia     PVCs    Calculus of kidney     Depression     Hypercholesterolemia     Hyperlipidemia     Hypertension     marginally high readings per pt - high 130s + per pt    Kidney stone     Mitral valve prolapse     Polycystic liver disease     Sleep apnea     mild - uses mouth piece     Social History     Tobacco Use    Smoking status: Former Smoker     Packs/day: 1.00     Years: 12.00     Pack years: 12.00     Types: Cigarettes    Smokeless tobacco: Never Used    Tobacco comment: quit 45 yrs ago - was an occasionally smoker in college/law school   Substance Use Topics    Alcohol use: Yes     Comment: glass of wine a few times a week if that     Drug use: No     No Known Allergies  Family History   Problem Relation Age of Onset    Heart Disease Mother     Cancer Mother         bladder    Heart Failure Mother         CHF    Stroke Father     No Known Problems Sister     No Known Problems Brother      No current facility-administered medications for this encounter. PHYSICAL EXAM:  General: WD, WN. Alert, cooperative, no acute distress    HEENT: NC, Atraumatic. PERRLA, EOMI. Anicteric sclerae. Lungs:  CTA Bilaterally. No Wheezing/Rhonchi/Rales. Heart:  Regular  rhythm,  No murmur, No Rubs, No Gallops  Abdomen: Soft, Non distended, Non tender.  +Bowel sounds, no HSM  Extremities: No c/c/e  Neurologic:  CN 2-12 gi, Alert and oriented X 3.   No acute neurological distress   Psych:   Good insight. Not anxious nor agitated. The heart, lungs and mental status were satisfactory for the administration of MAC sedation and for the procedure.       Mallampati score: 2       Assessment:   · Occult blood in stools    Plan:   · Endoscopic procedure  · MAC sedation   ·

## 2019-06-05 NOTE — ANESTHESIA POSTPROCEDURE EVALUATION
Post-Anesthesia Evaluation and Assessment    Patient: Wayne Siu MRN: [id number]  SSN: [id number]    YOB: 1950  Age: 68 y.o. Sex: male      I have evaluated the patient and they are stable and ready for discharge from the PACU. Cardiovascular Function/Vital Signs  Visit Vitals  /75   Pulse 60   Temp 36.4 °C (97.6 °F)   Resp 14   Wt 73.5 kg (162 lb)   SpO2 96%   BMI 26.96 kg/m²       Patient is status post MAC anesthesia for Procedure(s):  COLONOSCOPY  ENDOSCOPIC ARGON PLASMA COAGULATION  ENDOSCOPIC POLYPECTOMY. Nausea/Vomiting: None    Postoperative hydration reviewed and adequate. Pain:  Pain Scale 1: Numeric (0 - 10) (06/05/19 0834)  Pain Intensity 1: 0 (06/05/19 0834)   Managed    Neurological Status: At baseline    Mental Status, Level of Consciousness: Alert and  oriented to person, place, and time    Pulmonary Status:   O2 Device: Room air (06/05/19 0834)   Adequate oxygenation and airway patent    Complications related to anesthesia: None    Post-anesthesia assessment completed. No concerns    Signed By: Jazlyn Reeves MD     June 5, 2019              Procedure(s):  COLONOSCOPY  ENDOSCOPIC ARGON PLASMA COAGULATION  ENDOSCOPIC POLYPECTOMY. MAC    <BSHSIANPOST>    Vitals Value Taken Time   /75 6/5/2019  8:37 AM   Temp 36.4 °C (97.6 °F) 6/5/2019  8:19 AM   Pulse 63 6/5/2019  8:44 AM   Resp 0 6/5/2019  8:46 AM   SpO2 97 % 6/5/2019  8:44 AM   Vitals shown include unvalidated device data.

## 2019-06-07 RX ORDER — AMLODIPINE BESYLATE 5 MG/1
5 TABLET ORAL DAILY
Qty: 90 TAB | Refills: 1 | Status: ON HOLD | OUTPATIENT
Start: 2019-06-07 | End: 2019-12-19

## 2019-06-07 RX ORDER — ATORVASTATIN CALCIUM 20 MG/1
20 TABLET, FILM COATED ORAL EVERY EVENING
Qty: 90 TAB | Refills: 3 | Status: SHIPPED | OUTPATIENT
Start: 2019-06-07 | End: 2019-10-03 | Stop reason: SDUPTHER

## 2019-06-07 RX ORDER — FLECAINIDE ACETATE 50 MG/1
50 TABLET ORAL 2 TIMES DAILY
Qty: 135 TAB | Refills: 1 | Status: ON HOLD | OUTPATIENT
Start: 2019-06-07 | End: 2019-12-19

## 2019-06-07 NOTE — PROGRESS NOTES
Request for Flecainide 25 mg in am and 50 mg in the evening and Amlodipine 5 mg daily . Last office visit 4/30/19, next office visit 10/3/19. Refills per verbal order from Dr. Eliezer Henson.

## 2019-07-30 NOTE — TELEPHONE ENCOUNTER
From: Mahendra Rogel  To: Celeste Evans NP  Sent: 4/10/2017 1:41 PM EDT  Subject: Medication Renewal Request    Original authorizing provider: Celeste Evans NP    Dede Malhotra would like a refill of the following medications:  flecainide (TAMBOCOR) 50 mg tablet Celeste Evans NP]    Preferred pharmacy: [facility]    Comment:      Medication renewals requested in this message routed to other providers:  amLODIPine (NORVASC) 5 mg tablet Zara Barber MD] no

## 2019-10-03 ENCOUNTER — OFFICE VISIT (OUTPATIENT)
Dept: CARDIOLOGY CLINIC | Age: 69
End: 2019-10-03

## 2019-10-03 VITALS
RESPIRATION RATE: 14 BRPM | HEIGHT: 65 IN | BODY MASS INDEX: 26.99 KG/M2 | SYSTOLIC BLOOD PRESSURE: 120 MMHG | WEIGHT: 162 LBS | HEART RATE: 61 BPM | DIASTOLIC BLOOD PRESSURE: 76 MMHG | OXYGEN SATURATION: 95 %

## 2019-10-03 DIAGNOSIS — I44.0 FIRST DEGREE AV BLOCK: ICD-10-CM

## 2019-10-03 DIAGNOSIS — Z51.81 ENCOUNTER FOR MONITORING FLECAINIDE THERAPY: ICD-10-CM

## 2019-10-03 DIAGNOSIS — Z79.899 ENCOUNTER FOR MONITORING FLECAINIDE THERAPY: ICD-10-CM

## 2019-10-03 DIAGNOSIS — I10 ESSENTIAL HYPERTENSION: ICD-10-CM

## 2019-10-03 DIAGNOSIS — E78.5 HYPERLIPIDEMIA, UNSPECIFIED HYPERLIPIDEMIA TYPE: ICD-10-CM

## 2019-10-03 DIAGNOSIS — I49.3 PVC (PREMATURE VENTRICULAR CONTRACTION): Primary | ICD-10-CM

## 2019-10-03 RX ORDER — ATORVASTATIN CALCIUM 20 MG/1
20 TABLET, FILM COATED ORAL EVERY EVENING
Qty: 90 TAB | Refills: 3 | Status: SHIPPED | OUTPATIENT
Start: 2019-10-03 | End: 2020-09-03

## 2019-10-03 RX ORDER — SULFAMETHOXAZOLE AND TRIMETHOPRIM 800; 160 MG/1; MG/1
1 TABLET ORAL 2 TIMES DAILY
COMMUNITY
End: 2019-12-18

## 2019-10-03 NOTE — PROGRESS NOTES
Cardiac Electrophysiology Office Note     Subjective: Earline Serra is a 68 y.o. male patient who is seen for follow up of PVCs   He was kindly referred by Dr. Kelly Renteria. Interim hx:   He is here today after resuming 50 mg am and 25 mg pm flecainide  He feels no more PVC and he has intermittent dizziness standing while sailing but not much  He plans to retire in 2020    Past hx:  He reports he has PVCs for a \"long time\". For several years it has been transient and rarely bothered him. The past couple months he has noticed them more and thinks they have been occuring more frequently after spring break trip with 2 children in high school  He reports he feels the sensation of a skipped beat. Associated symptoms include \"queezy feeling\". He started the flecainide when he returned from his trip to [place]. Pmhx includes hyperlipidemia. Then hypertension    Exercise stress nuclear test 2016 showed  Good exercise capacity. No ischemia on ECG, PVC, but there are frequent PACs  BP was very high 250/100 with exercise. Perfusion images showed diaphragmatic attenuation artifacts in the inferior and inferolateral walls (base to distal)  GATED SPECT: LVEF 69% normal wall motion and thickening    Overall study: no ischemia or infarct  PACs but no PVC  Flecainide is effective in suppressing PVCs  BP is high and recommend BP medication: norvasc     Holter before flecainide showed 33 thousands PVCs, mostly one morphology and appears to come from outflow tract but there is another PVC with superior axis and not coming from same place     Normal stress echo  and normal EF on recent echo        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 5 children (all in good health), . Half brother may have a pacemaker.      Stress echo 12 with Dr Ariel Williamson  Normal resting electrocardiogram.  Above average functional capacity (>20%).   Appropriate heart rate response to exercise.  Normal resting blood pressure.  Appropriate blood pressure response to exercise. No chest pain.  No arrhythmias.  No ST changes.  Overall impression: Normal stress electrocardiogram.  Resting echo with normal regional and  global contractility.  Post exercise, uniformly enhanced myocardial contractility. Patient Active Problem List    Diagnosis Date Noted    Advanced directives, counseling/discussion 03/17/2016    PVC (premature ventricular contraction) 03/17/2016    Erectile dysfunction 02/24/2016    Encounter for long-term (current) use of other medications 02/23/2015    Unspecified vitamin D deficiency 02/23/2015    Attention deficit disorder without mention of hyperactivity 09/30/2014    Proteinuria 07/31/2014    Elevated blood pressure reading without diagnosis of hypertension 01/30/2014    Tinnitus of both ears 07/30/2013    Urticaria 07/30/2013    Calculus of kidney 07/30/2013    Insomnia, unspecified 07/30/2013    Mitral prolapse 02/07/2012    Hypercholesteremia 02/07/2012     Current Outpatient Medications   Medication Sig Dispense Refill    trimethoprim-sulfamethoxazole (BACTRIM DS) 160-800 mg per tablet Take 1 Tab by mouth two (2) times a day.  atorvastatin (LIPITOR) 20 mg tablet Take 1 Tab by mouth every evening. 90 Tab 3    flecainide (TAMBOCOR) 50 mg tablet Take 1 Tab by mouth two (2) times a day. Take 25 mg in the am and 50 mg in the evening 135 Tab 1    amLODIPine (NORVASC) 5 mg tablet Take 1 Tab by mouth daily. 90 Tab 1    finasteride (PROSCAR) 5 mg tablet Take 5 mg by mouth every evening.  sildenafil citrate (VIAGRA) 100 mg tablet Take 1 Tab by mouth as needed. Fill as generic. 8 Tab 0    tamsulosin (FLOMAX) 0.4 mg capsule Take 0.4 mg by mouth every evening.  flecainide (TAMBOCOR) 50 mg tablet Take 25 mg by mouth every morning.       multivit-min/FA/lycopen/lutein (CENTRUM SILVER MEN PO) Take  by mouth. Takes one po 3-4 days per week. No Known Allergies  Past Medical History:   Diagnosis Date    Arrhythmia     PVCs    Calculus of kidney 2011    Depression     Hypercholesterolemia     Hyperlipidemia     Hypertension     marginally high readings per pt - high 130s + per pt    Kidney stone     Mitral valve prolapse     Polycystic liver disease     Sleep apnea     mild - uses mouth piece     Past Surgical History:   Procedure Laterality Date    COLONOSCOPY N/A 6/5/2019    [facility] ENDOSCOPY    ENDOSCOPY, COLON, DIAGNOSTIC  2005    polyp- dr Josefina Ramirez, 2012, 2017    HX HEENT      uvulectomy    HX HEENT      wisdom teeth removed    HX VASECTOMY  2002     Family History   Problem Relation Age of Onset    Heart Disease Mother     Cancer Mother         bladder    Heart Failure Mother         CHF    Stroke Father     No Known Problems Sister     No Known Problems Brother      Social History     Tobacco Use    Smoking status: Former Smoker     Packs/day: 1.00     Years: 12.00     Pack years: 12.00     Types: Cigarettes    Smokeless tobacco: Never Used    Tobacco comment: quit 45 yrs ago - was an occasionally smoker in college/law school   Substance Use Topics    Alcohol use: Yes     Comment: glass of wine a few times a week if that         Review of Systems:   Constitutional: Negative for fever, chills, weight loss, malaise/fatigue. HEENT: Negative for nosebleeds, vision changes. Respiratory: Negative for cough, hemoptysis, sputum production, and wheezing. Cardiovascular:  + episodic palpitations, no orthopnea, claudication, leg swelling, syncope, and PND. Gastrointestinal: Negative for nausea, vomiting, diarrhea, constipation, blood in stool and melena. Genitourinary: Negative for dysuria, and hematuria. Musculoskeletal: Negative for myalgias, arthralgia.    Skin: Negative for rash. Heme: Does not bleed or bruise easily. Neurological: Negative for speech change and focal weakness     Objective:     Visit Vitals  /76 (BP 1 Location: Left arm, BP Patient Position: Sitting)   Pulse 61   Resp 14   Ht 5' 5\" (1.651 m)   Wt 162 lb (73.5 kg)   SpO2 95%   BMI 26.96 kg/m²      Physical Exam:   Constitutional: well-developed and well-nourished. No distress. Head: Normocephalic and atraumatic. Eyes: Pupils are equal, round  Neck: supple. No JVD present. Cardiovascular: regular rhythm and normal heart sounds. Exam reveals no gallop and no friction rub. No murmur heard. Pulmonary/Chest: Effort normal and breath sounds normal. No wheezes. Abdominal: Soft, no tenderness. Musculoskeletal: no edema. full ROM  Neurological: alert, oriented. Skin: Skin is warm and dry  Psychiatric: normal mood and affect. Behavior is normal. Judgment and thought content normal.       ECG NSR first degree av block  Poor R wave progression    Assessment/Plan:       ICD-10-CM ICD-9-CM    1. PVC (premature ventricular contraction) I49.3 427.69    2. Encounter for monitoring flecainide therapy Z51.81 V58.83 AMB POC EKG ROUTINE W/ 12 LEADS, INTER & REP    Z79.899 V58.69    3. First degree AV block I44.0 426.11    4. Essential hypertension I10 401.9    5. Hyperlipidemia, unspecified hyperlipidemia type E78.5 272.4    he is not symptomatic with PVCs when taking flecainide  I think the first degree AV block is related to his age. Looking back on the EKG with Dr. Ronalee Skiff before he even saw me back in 2016, the patient already had first degree AV block . I recommended he cut the Flecainide to 25 mg bid but 50 mg qam and 25 mg qpm works better for him  No indication for pacer or ablation at this time  I refilled his lipitor since he does not use express script any more.         Future Appointments   Date Time Provider Humaira Ni   5/7/2020 10:00 AM Autumn Kendrick MD [address] SCHED     Thank you for involving me in this patient's care and please call with further concerns or questions. Melvern Apley, M.D.   Electrophysiology/Cardiology  [facility]  [facility], [facility] [address] [place] [address]  [phone]

## 2019-12-16 NOTE — H&P
Subjective:   Patient ID: Yjackie Solis is a 68 y.o. male. Pain Score:   2  Chief Complaint: Follow-up of the Right Shoulder        75-year-old male with a history of right shoulder pain is been going on for a while. Initially he had an injection done back in March which worked but that ceased to be better and so he came back and had another injection back at the end of August.  That unfortunately did not really work at all. He was set up for an MRI which she had any comes back today for follow-up. He has persistent symptoms of pain typically tries to raise arm up over his head or sleep on that side at nighttime. Pain is all lateral to the arm.     REVIEW OF SYSTEMS:  On ROS the patient complains only of shoulder pain. They deny any other positive findings. The patient specifically denies any fever, chills, nausea or vomiting. They deny any redness or erythema. They deny any numbness or tingling. They deny any decrease in urination or decrease in urine production.   They deny any other complaints of joint pain or swelling.     Medical History        Past Medical History:   Diagnosis Date    Sleep apnea           Surgical History         Past Surgical History:   Procedure Laterality Date    NO RELEVANT ORTHOPAEDIC SURGERIES        NO RELEVANT SURGERIES                   Family History   Problem Relation Age of Onset    Coronary artery disease Mother      No Known Problems Father      No Known Problems Brother      No Known Problems Sister        [facility]  Review of Systems   9/19/2019     Constitutional: Unexplained: Negative  Genitourinary: Frequent Urination: Negative  HEENT: Vision Loss: Negative  Neurological: Memory Loss: Negative  Integumentary: Rash: Negative  Cardiovascular: Palpatations: Positive  Hematologic: Bruises/Bleeds Easily: Negative  Gastrointestinal: Constipation: Negative  Immunological: Seasonal Allergies: Negative  Musculoskeletal: Joint Pain: Positive  Objective:    Vitals       Vitals:     09/19/19 1419   BP: 130/70   Pulse: (!) 57   Weight: 162 lb   Height: 5' 7\"            Constitutional:  No acute distress. Well nourished. Well developed. Psychiatric: Alert and oriented x3. Cardiovascular:  No marked edema. Skin:  No skin Changes or Rashes  Neurological:  No Obvious Neuro Deficits       Patient Active Problem List    Diagnosis Date Noted    Advanced directives, counseling/discussion 03/17/2016    PVC (premature ventricular contraction) 03/17/2016    Erectile dysfunction 02/24/2016    Encounter for long-term (current) use of other medications 02/23/2015    Unspecified vitamin D deficiency 02/23/2015    Attention deficit disorder without mention of hyperactivity 09/30/2014    Proteinuria 07/31/2014    Elevated blood pressure reading without diagnosis of hypertension 01/30/2014    Tinnitus of both ears 07/30/2013    Urticaria 07/30/2013    Calculus of kidney 07/30/2013    Insomnia, unspecified 07/30/2013    Mitral prolapse 02/07/2012    Hypercholesteremia 02/07/2012     Past Medical History:   Diagnosis Date    Arrhythmia     PVCs    Calculus of kidney 2011    Depression     Hypercholesterolemia     Hyperlipidemia     Hypertension     marginally high readings per pt - high 130s + per pt    Kidney stone     Mitral valve prolapse     Polycystic liver disease     Sleep apnea     mild - uses mouth piece      Past Surgical History:   Procedure Laterality Date    COLONOSCOPY N/A 6/5/2019    COLONOSCOPY performed by Eugenie Fothergill, MD at [facility]. [address], COLON, DIAGNOSTIC  2005    polyp- dr Leopoldo Dresser, 2012, 2017    HX GI      COLONOSCOPY    HX HEENT      uvulectomy    HX HEENT      wisdom teeth removed    HX VASECTOMY  2002      Prior to Admission medications    Medication Sig Start Date End Date Taking? Authorizing Provider   fluorouracil (EFUDEX) 5 % chemo cream Apply  to affected area two (2) times a day.  Apply to face only using for 3 weeks    Provider, Historical   atorvastatin (LIPITOR) 20 mg tablet Take 1 Tab by mouth every evening. 10/3/19   Danielle Angel MD   flecainide (TAMBOCOR) 50 mg tablet Take 1 Tab by mouth two (2) times a day. Take 25 mg in the am and 50 mg in the evening 6/7/19   Danielle Angel MD   amLODIPine (NORVASC) 5 mg tablet Take 1 Tab by mouth daily. 6/7/19   Danielle Angel MD   finasteride (PROSCAR) 5 mg tablet Take 5 mg by mouth every evening. Provider, Historical   sildenafil citrate (VIAGRA) 100 mg tablet Take 1 Tab by mouth as needed. Fill as generic. 7/5/18   Boy Roberts MD   [facility]) 0.4 mg capsule Take 0.4 mg by mouth every evening. Provider, Historical     No Known Allergies   Social History     Tobacco Use    Smoking status: Former Smoker     Packs/day: 1.00     Years: 12.00     Pack years: 12.00     Types: Cigarettes    Smokeless tobacco: Never Used    Tobacco comment: quit 45 yrs ago - was an occasionally smoker in college/law school   Substance Use Topics    Alcohol use: Yes     Comment: glass of wine a few times a week if that       Family History   Problem Relation Age of Onset    Heart Disease Mother    [facility] Cancer Mother         bladder    Heart Failure Mother         CHF    Stroke Father     No Known Problems Sister     No Known Problems Brother     No Known Problems Daughter     No Known Problems Daughter     No Known Problems Son     No Known Problems Son     No Known Problems Son     Anesth Problems Neg Hx             No data found. General appearance: alert, cooperative, no distress, appears stated age  Head: Normocephalic, without obvious abnormality, atraumatic  Lungs: clear to auscultation bilaterally  Heart: regular rate and rhythm, S1, S2 normal, no murmur  Abdomen: soft, non-tender. Bowel sounds normal. No masses,  no organomegaly  Extremities: Walks with a normal gait today. Right shoulder:  No skin changes, rashes, erythema, deformity, or bruising. Positive impingement sign anteriorly and laterally. Pain with resisted rotator cuff strength testing but no mery weakness. He has reasonable strength in forward flexion, external rotation, internal rotation and external rotation with the arm in abduction. His pain is moderate but does affect his strength. Normal range of motion. Normal stability. No crepitance. Mild AC joint tenderness. Positive Whipple test with just pain. Load and shift testing is equivocal due to discomfort but no instability. Left shoulder  shows normal range of motion. There are no skin changes, rashes, deformity, or bruising. He has full strength. He has normal stability. He has good distal arm musculature. He has no edema. He has good pulses, good cap refill and good sensation distally. Pulses: 2+ and symmetric  Neurologic: Grossly normal         Procedures:    Large Joint Arthrocentesis: R subacromial bursa  Consent given by: patient  Timeout: Immediately prior to procedure a time out was called to verify the correct patient, procedure, equipment, support staff and site/side marked as required   Supporting Documentation  Indications: pain   Procedure Details  Location: Shoulder R subacromial bursa   Preparation: Patient was prepped and draped in the usual sterile fashion. Additional Procedural Details:  Risks and benefits of cortisone injection discussed and patient in agreement to proceed.     Needle size: 25 G  Approach: posterior  Patient tolerance: patient tolerated the procedure well with no immediate complications        Medications administered: 2 mL bupivacaine 0.5 %; 1 mL depo-medrol 40 MG/ML     Patient Education: Cortisone Flare Risk Discussed and Diabetes Risk Discussed         Radiographs:             Mri Shoulder Right Without Contrast ([id number])     Result Date: 9/17/2019  [facility], [facility] MRI INDICATION: Pain.  COMPARISON: None TECHNIQUE: Axial proton density fat-saturated; oblique coronal T1, T2 fat-saturated, and proton density fat-saturated; and oblique sagittal T2 fat-saturated MRI of the right shoulder. CONTRAST: None. FINDINGS: A.C. joint: Moderate osteoarthrosis. Anterior acromion process type: 2-3. Bone marrow: Within normal limits. Incidental bone island within posterior glenoid. No acute fracture, dislocation, or marrow replacing process. Joint fluid: Trace fluid signal in subacromial subdeltoid bursa. Rotator cuff tendons: Diffuse tendinopathy of supraspinatus and infraspinatus tendons with mild articular and bursal sided contour irregularity of the anterior supraspinatus tendon bursal sided contour irregularity of the anterior infraspinatus tendon. No full-thickness tendon tear demonstrated. Biceps tendon: Anatomically located. Intact origin. No substantial tendinopathy. Muscles: No edema or atrophy. Glenoid labrum: Intact. Glenohumeral joint capsule: within normal limits. Glenohumeral articular cartilage: Mild-moderate osteoarthrosis with small marginal osteophytes. Soft tissue mass: None. IMPRESSION: 1. Moderate acromioclavicular and mild-moderate glenohumeral osteoarthrosis. 2. Rotator cuff tendinopathy, greatest at anterior supraspinatus tendon. No full-thickness tendon tear demonstrated. Disler MD Nick Dumont     Reviewed the MRI which shows evidence of anterior lateral spur of the acromion with some AC arthrosis and evidence of impingement of the anterior supraspinatus tendon. Assessment:      1. Impingement syndrome of right shoulder    2. Primary osteoarthritis of right shoulder          Plan:      He has got evidence of some thinning of the rotator cuff due to impingement. He has got a fairly significant lateral and anterior spur as well as AC arthrosis. We talked at length today about surgical versus non surgical management and she wants to try another injection. We are also going to put him on some diclofenac which she has at home from her hip problem. The combination of the 2 hopefully make him feel better.   He will continue his physical therapy. He does improve and persistent bothering him we could do a subacromial decompression and distal clavicle excision although hopefully we will have to do that. We will see how he responds.         Orders Placed This Encounter    Large Joint Arthrocentesis    BMI >=25 PATIENT INSTRUCTIONS & EDUCATION    BP Elevated Patient Education & Instructions      Return in about 2 weeks (around 10/3/2019).    Julio César Rodriguez MD       Date of Surgery Update:  Natty Barragan was seen and examined. History and physical has been reviewed. The patient has been examined. There have been no significant clinical changes since the completion of the originally dated History and Physical.  Patient identified by surgeon; surgical site was confirmed by patient and surgeon.     Signed By: Stiven(Lydia) Theodore Paez MD     December 19, 2019 9:39 AM

## 2019-12-18 ENCOUNTER — HOSPITAL ENCOUNTER (OUTPATIENT)
Dept: PREADMISSION TESTING | Age: 69
Discharge: HOME | End: 2019-12-18
Payer: COMMERCIAL

## 2019-12-18 ENCOUNTER — ANESTHESIA EVENT (OUTPATIENT)
Dept: MEDSURG UNIT | Age: 69
End: 2019-12-18
Payer: COMMERCIAL

## 2019-12-18 VITALS
SYSTOLIC BLOOD PRESSURE: 143 MMHG | DIASTOLIC BLOOD PRESSURE: 81 MMHG | RESPIRATION RATE: 18 BRPM | TEMPERATURE: 97.8 F | HEIGHT: 67 IN | HEART RATE: 60 BPM | WEIGHT: 166.13 LBS | BODY MASS INDEX: 26.08 KG/M2

## 2019-12-18 LAB — HGB BLD-MCNC: 15.9 G/DL (ref 12.1–17)

## 2019-12-18 PROCEDURE — 36415 COLL VENOUS BLD VENIPUNCTURE: CPT

## 2019-12-18 PROCEDURE — 85018 HEMOGLOBIN: CPT

## 2019-12-18 RX ORDER — ONDANSETRON 2 MG/ML
4 INJECTION INTRAMUSCULAR; INTRAVENOUS AS NEEDED
Status: CANCELLED | OUTPATIENT
Start: 2019-12-18

## 2019-12-18 RX ORDER — DIPHENHYDRAMINE HYDROCHLORIDE 50 MG/ML
12.5 INJECTION, SOLUTION INTRAMUSCULAR; INTRAVENOUS AS NEEDED
Status: CANCELLED | OUTPATIENT
Start: 2019-12-18 | End: 2019-12-18

## 2019-12-18 RX ORDER — SODIUM CHLORIDE 0.9 % (FLUSH) 0.9 %
5-40 SYRINGE (ML) INJECTION AS NEEDED
Status: CANCELLED | OUTPATIENT
Start: 2019-12-18

## 2019-12-18 RX ORDER — MIDAZOLAM HYDROCHLORIDE 1 MG/ML
1 INJECTION, SOLUTION INTRAMUSCULAR; INTRAVENOUS
Status: CANCELLED | OUTPATIENT
Start: 2019-12-18

## 2019-12-18 RX ORDER — SODIUM CHLORIDE 0.9 % (FLUSH) 0.9 %
5-40 SYRINGE (ML) INJECTION EVERY 8 HOURS
Status: CANCELLED | OUTPATIENT
Start: 2019-12-18

## 2019-12-18 RX ORDER — FLUOROURACIL 50 MG/G
CREAM TOPICAL 2 TIMES DAILY
COMMUNITY
End: 2020-05-07

## 2019-12-18 RX ORDER — SODIUM CHLORIDE, SODIUM LACTATE, POTASSIUM CHLORIDE, CALCIUM CHLORIDE 600; 310; 30; 20 MG/100ML; MG/100ML; MG/100ML; MG/100ML
125 INJECTION, SOLUTION INTRAVENOUS CONTINUOUS
Status: CANCELLED | OUTPATIENT
Start: 2019-12-18

## 2019-12-18 RX ORDER — MORPHINE SULFATE 10 MG/ML
2 INJECTION, SOLUTION INTRAMUSCULAR; INTRAVENOUS
Status: CANCELLED | OUTPATIENT
Start: 2019-12-18

## 2019-12-18 RX ORDER — OXYCODONE HYDROCHLORIDE 5 MG/1
5 TABLET ORAL AS NEEDED
Status: CANCELLED | OUTPATIENT
Start: 2019-12-18

## 2019-12-18 RX ORDER — FENTANYL CITRATE 50 UG/ML
25 INJECTION, SOLUTION INTRAMUSCULAR; INTRAVENOUS
Status: CANCELLED | OUTPATIENT
Start: 2019-12-18

## 2019-12-18 NOTE — PERIOP NOTES
Preoperative instructions reviewed with patient. Patient given  2 bottles of CHG soap. Instructions reviewed on use of CHG soap. Patient given SSI infection FAQS sheet, Patient was given the opportunity to ask questions on the information provided.

## 2019-12-19 ENCOUNTER — ANESTHESIA (OUTPATIENT)
Dept: MEDSURG UNIT | Age: 69
End: 2019-12-19
Payer: COMMERCIAL

## 2019-12-19 ENCOUNTER — HOSPITAL ENCOUNTER (OUTPATIENT)
Age: 69
Setting detail: OUTPATIENT SURGERY
Discharge: HOME | End: 2019-12-19
Attending: ORTHOPAEDIC SURGERY | Admitting: ORTHOPAEDIC SURGERY
Payer: COMMERCIAL

## 2019-12-19 VITALS
HEART RATE: 73 BPM | DIASTOLIC BLOOD PRESSURE: 83 MMHG | RESPIRATION RATE: 8 BRPM | OXYGEN SATURATION: 97 % | SYSTOLIC BLOOD PRESSURE: 125 MMHG | TEMPERATURE: 97.6 F

## 2019-12-19 PROCEDURE — 77030019908 HC STETH ESOPH SIMS -A: Performed by: ANESTHESIOLOGY

## 2019-12-19 PROCEDURE — 77030018835 HC SOL IRR LR ICUM -A: Performed by: ORTHOPAEDIC SURGERY

## 2019-12-19 PROCEDURE — 74011250636 HC RX REV CODE- 250/636: Performed by: ANESTHESIOLOGY

## 2019-12-19 PROCEDURE — 76030000001 HC AMB SURG OR TIME 1 TO 1.5: Performed by: ORTHOPAEDIC SURGERY

## 2019-12-19 PROCEDURE — 74011250636 HC RX REV CODE- 250/636: Performed by: ORTHOPAEDIC SURGERY

## 2019-12-19 PROCEDURE — 77030040361 HC SLV COMPR DVT MDII -B: Performed by: ORTHOPAEDIC SURGERY

## 2019-12-19 PROCEDURE — 77030016678 HC BUR SHV4 S&N -B: Performed by: ORTHOPAEDIC SURGERY

## 2019-12-19 PROCEDURE — 74011000250 HC RX REV CODE- 250: Performed by: ORTHOPAEDIC SURGERY

## 2019-12-19 PROCEDURE — 77030002916 HC SUT ETHLN J&J -A: Performed by: ORTHOPAEDIC SURGERY

## 2019-12-19 PROCEDURE — 77030040922 HC BLNKT HYPOTHRM STRY -A

## 2019-12-19 PROCEDURE — 74011000250 HC RX REV CODE- 250: Performed by: NURSE ANESTHETIST, CERTIFIED REGISTERED

## 2019-12-19 PROCEDURE — 74011250636 HC RX REV CODE- 250/636: Performed by: NURSE ANESTHETIST, CERTIFIED REGISTERED

## 2019-12-19 PROCEDURE — 76210000035 HC AMBSU PH I REC 1 TO 1.5 HR: Performed by: ORTHOPAEDIC SURGERY

## 2019-12-19 PROCEDURE — 74011250636 HC RX REV CODE- 250/636: Performed by: PHYSICIAN ASSISTANT

## 2019-12-19 PROCEDURE — 76060000062 HC AMB SURG ANES 1 TO 1.5 HR: Performed by: ORTHOPAEDIC SURGERY

## 2019-12-19 PROCEDURE — 77030006884 HC BLD SHV INCIS S&N -B: Performed by: ORTHOPAEDIC SURGERY

## 2019-12-19 PROCEDURE — 77030010509 HC AIRWY LMA MSK TELE -A: Performed by: ANESTHESIOLOGY

## 2019-12-19 RX ORDER — FLECAINIDE ACETATE 50 MG/1
TABLET ORAL
Qty: 135 TAB | Refills: 1 | Status: SHIPPED | OUTPATIENT
Start: 2019-12-19 | End: 2020-06-14

## 2019-12-19 RX ORDER — SODIUM CHLORIDE 0.9 % (FLUSH) 0.9 %
5-40 SYRINGE (ML) INJECTION AS NEEDED
Status: DISCONTINUED | OUTPATIENT
Start: 2019-12-19 | End: 2019-12-19 | Stop reason: HOSPADM

## 2019-12-19 RX ORDER — MIDAZOLAM HYDROCHLORIDE 1 MG/ML
1 INJECTION, SOLUTION INTRAMUSCULAR; INTRAVENOUS AS NEEDED
Status: DISCONTINUED | OUTPATIENT
Start: 2019-12-19 | End: 2019-12-19 | Stop reason: HOSPADM

## 2019-12-19 RX ORDER — PROPOFOL 10 MG/ML
INJECTION, EMULSION INTRAVENOUS AS NEEDED
Status: DISCONTINUED | OUTPATIENT
Start: 2019-12-19 | End: 2019-12-19 | Stop reason: HOSPADM

## 2019-12-19 RX ORDER — SODIUM CHLORIDE, SODIUM LACTATE, POTASSIUM CHLORIDE, CALCIUM CHLORIDE 600; 310; 30; 20 MG/100ML; MG/100ML; MG/100ML; MG/100ML
125 INJECTION, SOLUTION INTRAVENOUS CONTINUOUS
Status: DISCONTINUED | OUTPATIENT
Start: 2019-12-19 | End: 2019-12-19 | Stop reason: HOSPADM

## 2019-12-19 RX ORDER — FENTANYL CITRATE 50 UG/ML
50 INJECTION, SOLUTION INTRAMUSCULAR; INTRAVENOUS AS NEEDED
Status: DISCONTINUED | OUTPATIENT
Start: 2019-12-19 | End: 2019-12-19 | Stop reason: HOSPADM

## 2019-12-19 RX ORDER — AMLODIPINE BESYLATE 5 MG/1
TABLET ORAL
Qty: 90 TAB | Refills: 1 | Status: SHIPPED | OUTPATIENT
Start: 2019-12-19 | End: 2020-06-14

## 2019-12-19 RX ORDER — PHENYLEPHRINE HCL IN 0.9% NACL 0.4MG/10ML
SYRINGE (ML) INTRAVENOUS AS NEEDED
Status: DISCONTINUED | OUTPATIENT
Start: 2019-12-19 | End: 2019-12-19 | Stop reason: HOSPADM

## 2019-12-19 RX ORDER — LIDOCAINE HYDROCHLORIDE 10 MG/ML
0.5 INJECTION, SOLUTION EPIDURAL; INFILTRATION; INTRACAUDAL; PERINEURAL AS NEEDED
Status: DISCONTINUED | OUTPATIENT
Start: 2019-12-19 | End: 2019-12-19 | Stop reason: HOSPADM

## 2019-12-19 RX ORDER — GLYCOPYRROLATE 0.2 MG/ML
INJECTION INTRAMUSCULAR; INTRAVENOUS AS NEEDED
Status: DISCONTINUED | OUTPATIENT
Start: 2019-12-19 | End: 2019-12-19 | Stop reason: HOSPADM

## 2019-12-19 RX ORDER — DEXTROSE, SODIUM CHLORIDE, SODIUM LACTATE, POTASSIUM CHLORIDE, AND CALCIUM CHLORIDE 5; .6; .31; .03; .02 G/100ML; G/100ML; G/100ML; G/100ML; G/100ML
125 INJECTION, SOLUTION INTRAVENOUS CONTINUOUS
Status: DISCONTINUED | OUTPATIENT
Start: 2019-12-19 | End: 2019-12-19 | Stop reason: HOSPADM

## 2019-12-19 RX ORDER — SODIUM CHLORIDE 0.9 % (FLUSH) 0.9 %
5-40 SYRINGE (ML) INJECTION EVERY 8 HOURS
Status: DISCONTINUED | OUTPATIENT
Start: 2019-12-19 | End: 2019-12-19 | Stop reason: HOSPADM

## 2019-12-19 RX ORDER — CEFAZOLIN SODIUM/WATER 2 G/20 ML
2 SYRINGE (ML) INTRAVENOUS ONCE
Status: COMPLETED | OUTPATIENT
Start: 2019-12-19 | End: 2019-12-19

## 2019-12-19 RX ORDER — ONDANSETRON 2 MG/ML
INJECTION INTRAMUSCULAR; INTRAVENOUS AS NEEDED
Status: DISCONTINUED | OUTPATIENT
Start: 2019-12-19 | End: 2019-12-19 | Stop reason: HOSPADM

## 2019-12-19 RX ORDER — FENTANYL CITRATE 50 UG/ML
INJECTION, SOLUTION INTRAMUSCULAR; INTRAVENOUS AS NEEDED
Status: DISCONTINUED | OUTPATIENT
Start: 2019-12-19 | End: 2019-12-19 | Stop reason: HOSPADM

## 2019-12-19 RX ORDER — LIDOCAINE HYDROCHLORIDE 20 MG/ML
INJECTION, SOLUTION EPIDURAL; INFILTRATION; INTRACAUDAL; PERINEURAL AS NEEDED
Status: DISCONTINUED | OUTPATIENT
Start: 2019-12-19 | End: 2019-12-19 | Stop reason: HOSPADM

## 2019-12-19 RX ORDER — ACETAMINOPHEN 325 MG/1
650 TABLET ORAL ONCE
Status: DISCONTINUED | OUTPATIENT
Start: 2019-12-19 | End: 2019-12-19 | Stop reason: HOSPADM

## 2019-12-19 RX ORDER — SODIUM CHLORIDE, SODIUM LACTATE, POTASSIUM CHLORIDE, AND CALCIUM CHLORIDE .6; .31; .03; .02 G/100ML; G/100ML; G/100ML; G/100ML
3000 IRRIGANT IRRIGATION ONCE
Status: COMPLETED | OUTPATIENT
Start: 2019-12-19 | End: 2019-12-19

## 2019-12-19 RX ORDER — ROPIVACAINE HYDROCHLORIDE 5 MG/ML
INJECTION, SOLUTION EPIDURAL; INFILTRATION; PERINEURAL
Status: COMPLETED | OUTPATIENT
Start: 2019-12-19 | End: 2019-12-19

## 2019-12-19 RX ORDER — DEXAMETHASONE SODIUM PHOSPHATE 4 MG/ML
INJECTION, SOLUTION INTRA-ARTICULAR; INTRALESIONAL; INTRAMUSCULAR; INTRAVENOUS; SOFT TISSUE AS NEEDED
Status: DISCONTINUED | OUTPATIENT
Start: 2019-12-19 | End: 2019-12-19 | Stop reason: HOSPADM

## 2019-12-19 RX ADMIN — MIDAZOLAM 4 MG: 1 INJECTION INTRAMUSCULAR; INTRAVENOUS at 08:18

## 2019-12-19 RX ADMIN — DEXAMETHASONE SODIUM PHOSPHATE 8 MG: 4 INJECTION, SOLUTION INTRAMUSCULAR; INTRAVENOUS at 08:36

## 2019-12-19 RX ADMIN — FENTANYL CITRATE 12.5 MCG: 50 INJECTION, SOLUTION INTRAMUSCULAR; INTRAVENOUS at 08:59

## 2019-12-19 RX ADMIN — SODIUM CHLORIDE, SODIUM LACTATE, POTASSIUM CHLORIDE, AND CALCIUM CHLORIDE 125 ML/HR: 600; 310; 30; 20 INJECTION, SOLUTION INTRAVENOUS at 07:52

## 2019-12-19 RX ADMIN — GLYCOPYRROLATE 0.2 MG: 0.2 INJECTION INTRAMUSCULAR; INTRAVENOUS at 08:41

## 2019-12-19 RX ADMIN — Medication 80 MCG: at 08:45

## 2019-12-19 RX ADMIN — LIDOCAINE HYDROCHLORIDE 100 MG: 20 INJECTION, SOLUTION EPIDURAL; INFILTRATION; INTRACAUDAL; PERINEURAL at 08:30

## 2019-12-19 RX ADMIN — ONDANSETRON HYDROCHLORIDE 4 MG: 2 INJECTION, SOLUTION INTRAVENOUS at 09:36

## 2019-12-19 RX ADMIN — PROPOFOL 150 MG: 10 INJECTION, EMULSION INTRAVENOUS at 08:30

## 2019-12-19 RX ADMIN — FENTANYL CITRATE 50 MCG: 50 INJECTION, SOLUTION INTRAMUSCULAR; INTRAVENOUS at 08:18

## 2019-12-19 RX ADMIN — Medication 80 MCG: at 08:51

## 2019-12-19 RX ADMIN — ROPIVACAINE HYDROCHLORIDE 30 ML: 5 INJECTION, SOLUTION EPIDURAL; INFILTRATION; PERINEURAL at 08:05

## 2019-12-19 RX ADMIN — FENTANYL CITRATE 37.5 MCG: 50 INJECTION, SOLUTION INTRAMUSCULAR; INTRAVENOUS at 09:36

## 2019-12-19 RX ADMIN — Medication 2 G: at 08:40

## 2019-12-19 RX ADMIN — FENTANYL CITRATE 50 MCG: 50 INJECTION, SOLUTION INTRAMUSCULAR; INTRAVENOUS at 08:30

## 2019-12-19 NOTE — ANESTHESIA PREPROCEDURE EVALUATION
Relevant Problems   No relevant active problems       Anesthetic History               Review of Systems / Medical History  Patient summary reviewed, nursing notes reviewed and pertinent labs reviewed    Pulmonary        Sleep apnea           Neuro/Psych         Psychiatric history     Cardiovascular    Hypertension        Dysrhythmias : PVC      Exercise tolerance: >4 METS     GI/Hepatic/Renal                Endo/Other             Other Findings              Physical Exam    Airway  Mallampati: I  TM Distance: > 6 cm  Neck ROM: normal range of motion   Mouth opening: Normal     Cardiovascular    Rhythm: regular  Rate: normal         Dental  No notable dental hx       Pulmonary  Breath sounds clear to auscultation               Abdominal         Other Findings            Anesthetic Plan    ASA: 3  Anesthesia type: general      Post-op pain plan if not by surgeon: peripheral nerve block single    Induction: Intravenous  Anesthetic plan and risks discussed with: Patient

## 2019-12-19 NOTE — ANESTHESIA PROCEDURE NOTES
Peripheral Block    Start time: 12/19/2019 8:00 AM  End time: 12/19/2019 8:05 AM  Performed by: Johann Montalvo MD  Authorized by: Johann Montalvo MD       Pre-procedure: Indications: at surgeon's request and post-op pain management    Preanesthetic Checklist: risks and benefits discussed, site marked and timeout performed    Timeout Time: 08:00          Block Type:   Block Type:   Interscalene  Laterality:  Right  Monitoring:  Standard ASA monitoring, continuous pulse ox, frequent vital sign checks, heart rate, responsive to questions and oxygen  Injection Technique:  Single shot  Procedures: ultrasound guided and nerve stimulator    Patient Position: supine  Prep: betadine and povidone-iodine 7.5% surgical scrub    Location:  Interscalene  Needle Type:  Stimuplex  Needle Gauge:  22 G  Needle Localization:  Nerve stimulator and ultrasound guidance  Motor Response: minimal motor response >0.4 mA      Assessment:  Number of attempts:  1  Injection Assessment:  Incremental injection every 5 mL, local visualized surrounding nerve on ultrasound, negative aspiration for blood, no paresthesia, negative aspiration for CSF and ultrasound image on chart  Patient tolerance:  Patient tolerated the procedure well with no immediate complications

## 2019-12-19 NOTE — ROUTINE PROCESS
Patient: Earline Serra MRN: [id number]  SSN: [id number] YOB: 1950  Age: 68 y.o. Sex: male Patient is status post Procedure(s): RIGHT SHOULDER ARTHROSCOPY,  SUBACROMIAL DECOMPRESSION WITH DISTAL CLAVICLE EXCISION (GENERAL W/ BLOCK). Surgeon(s) and Role: 
   Stiven Poewrs MD (Jody) - Primary Rizo Rides, DO - Fellow Local/Dose/Irrigation: see MAR Peripheral IV 12/19/19 Left Arm (Active) Site Assessment Clean, dry, & intact 12/19/2019  7:36 AM  
Phlebitis Assessment 0 12/19/2019  7:36 AM  
Infiltration Assessment 0 12/19/2019  7:36 AM  
Dressing Status Clean, dry, & intact 12/19/2019  7:36 AM  
Dressing Type Tape;Transparent 12/19/2019  7:36 AM  
Hub Color/Line Status Blue; Infusing 12/19/2019  7:36 AM  
        
Airway - Endotracheal Tube 12/19/19 (Active) Dressing/Packing:  Wound Shoulder Right-Dressing Type: 4 x 4;ABD pad;Special tape (comment)(medipore tape) (12/19/19 0700) Splint/Cast: Wound Shoulder Right-Splint Type/Material: Sling] Other:

## 2019-12-19 NOTE — DISCHARGE INSTRUCTIONS
Ortho Erin ToIII MD Diego Salguero PA-C  POST OPERATIVE INSTRUCTIONS  Arthroscopic Subacromial Decompression - Shoulder         1. First meal at home should be clear liquids. Progress to regular diet as tolerated. 2. Apply an ice bag or use cold therapy device for 20-30 minutes 4 times a day for the first 2-3 days to reduce swelling and pain and then use as desired. 3. You may remove the bulky dressing 48  hours after surgery and at that time it is ok to shower and at that time it is ok to shower. Cover incisions with bandaids after showering. A sling is provided for your comfort and must be worn until the nerve block wears off. After that, use is optional and usually worn for 3-5 days. 4. A recliner position is often more comfortable for sleeping the first several days/ weeks after surgery. 5. Start the attached exercises 2-3 days after surgery. These are designed to keep your shoulder from getting stiff. Physical therapy will be discussed at your first post-operative visit. 6. Medication Instructions are listed below:    All narcotics can cause constipation; Advise drinking plenty of fluids and taking over the counter stool softener such as Miralax. 7. You may drive when your arm is out of the sling and you are no longer taking pain medication. 8. A post operative appointment has been scheduled for you  1/2 at 3:40pm with Dr. Jevon Doll. Please call the office if you need to change the date or time of your appointment. 9. If you develop a fever greater than 101, unexpected redness, or swelling in your arm please call the office. Some bleeding or drainage should be expected the first few days after surgery. 10. If you have any questions, please call my office, and the  will put you in touch with one of my assistants.   [phone])                                       Shoulder  Exercises    Pendulum Swing     Note: If you have pain in your back, do not do this exercise. 1. Hold on to a table or the back of a chair with your good arm. Then bend forward a little and let your sore arm hang straight down. This exercise does not use the arm muscles. Rather, use your legs and your hips to create movement that makes your arm swing freely. 2. Use the movement from your hips and legs to guide the slightly swinging arm back and forth like a pendulum (or elephant trunk). Then guide it in circles that start small (about the size of a dinner plate). Make the circles a bit larger each day, as your pain allows. 3. Do this exercise for 5 minutes, 3 times each day. 4. As you have less pain, try bending over a little farther to do this exercise. This will increase the amount of movement at your shoulder. Shoulder flexion (lying down)    1. Lie on your back, holding a cane  2. With both hands. Your palms should face down as you hold the cane   3. Keeping your elbows straight, slowly raise your arms over your head. Raise them until you feel a stretch in your shoulders, upper back, and chest.  4. Hold for 15 to 30 seconds. 5. Repeat 2 to 4 times. Shoulder rotation (lying down)    1. Lie on your back. Hold a wand with both hands with your elbows bent and palms up. 2. Keep your elbows close to your body, and move the wand across your body toward the sore arm. 3. Hold for 8 to 12 seconds. 4. Repeat 2 to 4 times. Shoulder internal rotation with towel    1. Hold a towel above and behind your head with the arm that is not sore. 2. With your sore arm, reach behind your back and grasp the towel. 3. With the arm above your head, pull the towel upward. Do this until you feel a stretch on the front and outside of your sore shoulder. 4. Hold 15 to 30 seconds. 5. Repeat 2 to 4 times. Shoulder blade squeeze    1. Stand with your arms at your sides, and squeeze your shoulder blades together.  Do not raise your shoulders up as you squeeze. 2. Hold 6 seconds. 3. Repeat 8 to 12 times.

## 2019-12-19 NOTE — TELEPHONE ENCOUNTER
Request for Flecainide 50 mg take 0.5 tab in AM and 1 tab in PM and Amlodipine 5 mg daily. Last office visit 10/3/19, next office visit 4/2/20. Refills per verbal order from Dr. Ramon Matthews.

## 2019-12-19 NOTE — OP NOTES
Shoulder Arthroscopy Operative Note      Patient: Katharine Simms MRN: [id number]  SSN: [id number]    YOB: 1950  Age: 68 y.o. Sex: male        Date of Surgery: 12/19/2019    Preoperative Diagnosis:    RIGHT ROTATOR CUFF TEAR WITH IMPINGMENT, DJD    Postoperative Diagnosis:  right Shoulder Impingement, Degenerative Labral Tear and AC Arthritis    Procedures:  1. Right Shoulder Arthroscopy and Sub-Acromial Decompression  2. Arthroscopic Distal Clavicle Excision  3. Extensive Debridement of Degenerative Labral Tear and Subacromial Space    Surgeon(s) and Role:     Stiven Alcala MD (Jody) - Primary       FIRST ASSISTANT- Andre Salcido PA-C    Second Assistant- Sherman Jarvis DO    Anesthesia: General    Pathology: Impingement and AC Arthritis    Estimated Blood Loss:  < 20 ml      Specimens: * No specimens in log *     Complications: None     Hospital Problems  Date Reviewed: 12/19/2019    None          Indications: The patient is a 68 y.o. male who has right shoulder impingement and AC arthritis . The patient has exhausted nonoperative modalities and is electively admitted for outpatient right shoulder arthroscopy. PROCEDURE IN DETAIL: After the procedure was explained to the patient, including the risks, benefits and possible complications, the patient signed the informed consent. The patient was then taken to the operating suite. Following administration of general anesthesia and interscalene block for postoperative pain control and infusion of intravenous antibiotic, the patient was positioned on the operating table in the supine fashion. The  right  shoulder was then examined under anesthesia and noted to be stable through full range of motion. At this point, the patient was then carefully positioned in the lateral decubitus position, left side down. The axillary roll was placed. Care was taken to pad both the upper and lower extremities.  The right shoulder was then prepped and draped in the sterile fashion. The right arm was then placed in the [employer] traction device in 45 degrees abduction and 30 degrees forward flexion with 10 pounds of traction. The scope was introduced into the shoulder and diagnostic arthroscopy commenced. Articular surfaces of the humeral head and glenoid were visualized and noted to be intact. The anterior, posterior, superior and inferior labrum were visualized. There was degeneratively torn. Extensive debridement of the labrum was done back to stable tissue. The biceps anchor and the biceps itself was intact. The undersurface of the rotator cuff was then visualized. There was no tear. The scope was introduced into the subacromial space. An anterior portal was created for inflow and  lateral portal was established for debridement using a spinal needle for localization. Hypertrophic hemorrhagic bursal tissue was then resected. The bursal side of the cuff was visualized and was not torn. The underside of the acromion was identified and denuded of all soft tissue. An acromioplasty was then performed from the posterior portal using a helicut christelle with the cutting block technique. The Acromion was shaved down to a type one acromion to remove all impingement. At this point the distal clavicle was identified and an arthroscopic distal clavicle resection was then performed. The distal 10mm of distal clavicle was then resected. Care was taken to preserve the posterior/superior capsule. The scope was introduced back into the subacromial space. At this point, with the procedure complete, all arthroscopic equipment was removed from the shoulder. The portals were reapproximated using 2-0 nylon sutures. A sterile dressing was applied. The Cryo/Cuff and sling were applied. The patient was then transferred to the Recovery Room in stable condition. .     Signed: Stiven Hernandez MD (12/19/2019 at 9:34 AM)

## 2019-12-21 NOTE — ANESTHESIA POSTPROCEDURE EVALUATION
Procedure(s):  RIGHT SHOULDER ARTHROSCOPY,  SUBACROMIAL DECOMPRESSION WITH DISTAL CLAVICLE EXCISION (GENERAL W/ BLOCK). general    Anesthesia Post Evaluation      Multimodal analgesia: multimodal analgesia used between 6 hours prior to anesthesia start to PACU discharge  Patient location during evaluation: PACU  Patient participation: complete - patient participated  Level of consciousness: awake  Pain score: 2  Pain management: adequate  Airway patency: patent  Anesthetic complications: no  Cardiovascular status: acceptable  Respiratory status: acceptable  Hydration status: acceptable  Comments: I have evaluated the patient and meets criteria for discharge from PACU. Carmen Calderon MD  Post anesthesia nausea and vomiting:  controlled      Vitals Value Taken Time   /82 12/19/2019 10:15 AM   Temp 36.4 °C (97.6 °F) 12/19/2019  9:46 AM   Pulse 65 12/19/2019 10:25 AM   Resp 13 12/19/2019 10:25 AM   SpO2 94 % 12/19/2019 10:25 AM   Vitals shown include unvalidated device data.

## 2020-01-28 DIAGNOSIS — N52.9 ERECTILE DYSFUNCTION, UNSPECIFIED ERECTILE DYSFUNCTION TYPE: ICD-10-CM

## 2020-01-28 RX ORDER — SILDENAFIL 100 MG/1
100 TABLET, FILM COATED ORAL AS NEEDED
Qty: 8 TAB | Refills: 0 | Status: SHIPPED | OUTPATIENT
Start: 2020-01-28 | End: 2020-06-16

## 2020-03-12 ENCOUNTER — HOSPITAL ENCOUNTER (OUTPATIENT)
Dept: CT IMAGING | Age: 70
Discharge: HOME | End: 2020-03-12
Attending: INTERNAL MEDICINE
Payer: MEDICARE

## 2020-03-12 DIAGNOSIS — R10.30 LOWER ABDOMINAL PAIN: ICD-10-CM

## 2020-03-12 LAB — CREAT BLD-MCNC: 0.8 MG/DL (ref 0.6–1.3)

## 2020-03-12 PROCEDURE — 74177 CT ABD & PELVIS W/CONTRAST: CPT

## 2020-03-12 PROCEDURE — 82565 ASSAY OF CREATININE: CPT

## 2020-03-12 PROCEDURE — 74011000258 HC RX REV CODE- 258: Performed by: RADIOLOGY

## 2020-03-12 PROCEDURE — 74011636320 HC RX REV CODE- 636/320: Performed by: RADIOLOGY

## 2020-03-12 RX ORDER — SODIUM CHLORIDE 0.9 % (FLUSH) 0.9 %
10 SYRINGE (ML) INJECTION
Status: COMPLETED | OUTPATIENT
Start: 2020-03-12 | End: 2020-03-12

## 2020-03-12 RX ADMIN — SODIUM CHLORIDE 100 ML: 900 INJECTION, SOLUTION INTRAVENOUS at 09:15

## 2020-03-12 RX ADMIN — Medication 10 ML: at 09:14

## 2020-03-12 RX ADMIN — IOPAMIDOL 100 ML: 755 INJECTION, SOLUTION INTRAVENOUS at 09:14

## 2020-03-26 ENCOUNTER — TELEPHONE (OUTPATIENT)
Dept: CARDIOLOGY CLINIC | Age: 70
End: 2020-03-26

## 2020-03-26 NOTE — TELEPHONE ENCOUNTER
Called Pt. Two patient identifiers confirmed. Pt will be moving to 4 months out. Pt stated that he is taking lipitor and norvasc and read that they could be contradictory of each other. Pt stated he would like to know if he should be taking them together.  Will ask MD/NP

## 2020-03-26 NOTE — TELEPHONE ENCOUNTER
Called Pt. Two patient identifiers confirmed. Notified pt about NP recommendations. Pt verbalized understanding of information discussed w/ no further questions at this time.

## 2020-05-07 ENCOUNTER — DOCUMENTATION ONLY (OUTPATIENT)
Dept: INTERNAL MEDICINE CLINIC | Age: 70
End: 2020-05-07

## 2020-05-07 ENCOUNTER — VIRTUAL VISIT (OUTPATIENT)
Dept: INTERNAL MEDICINE CLINIC | Age: 70
End: 2020-05-07

## 2020-05-07 VITALS — DIASTOLIC BLOOD PRESSURE: 77 MMHG | SYSTOLIC BLOOD PRESSURE: 131 MMHG | HEART RATE: 50 BPM

## 2020-05-07 DIAGNOSIS — Z12.5 SCREENING FOR PROSTATE CANCER: ICD-10-CM

## 2020-05-07 DIAGNOSIS — Z00.00 MEDICARE ANNUAL WELLNESS VISIT, INITIAL: Primary | ICD-10-CM

## 2020-05-07 DIAGNOSIS — I49.3 PVC (PREMATURE VENTRICULAR CONTRACTION): ICD-10-CM

## 2020-05-07 DIAGNOSIS — N40.1 BENIGN PROSTATIC HYPERPLASIA WITH URINARY FREQUENCY: ICD-10-CM

## 2020-05-07 DIAGNOSIS — R35.0 BENIGN PROSTATIC HYPERPLASIA WITH URINARY FREQUENCY: ICD-10-CM

## 2020-05-07 DIAGNOSIS — E78.00 HYPERCHOLESTEREMIA: ICD-10-CM

## 2020-05-07 RX ORDER — MELOXICAM 7.5 MG/1
7.5 TABLET ORAL
COMMUNITY
Start: 2020-04-21 | End: 2021-10-19 | Stop reason: ALTCHOICE

## 2020-05-07 NOTE — PROGRESS NOTES
HISTORY OF PRESENT ILLNESS  Yolande Bryant is a 68 y.o. male. This is a real-time audio/video visit brought to you by our sponsors, the fine folks at [facility] [place] and [employer]. [employer] platform   HPI \"Ana M\" is seen today for a follow-up of chronic problems and to have a Medicare Wellness Visit. 1. Wellness visit, see attached note. He is due for labs. He is up-to-date with colonoscopy, vaccinations, Urology follow-up, and eye examination. 2. Chronic problems are reviewed. Hyperlipidemia. He is due for routine labs. Hypertension, stable on current regimen. PVC. He has rare symptoms at this point. He does note a hello her heart rate but I told him this was likely his normal heart rate at this point as well as some effects of the flecainide. BPH, follows up with Urology. Hyperlipidemia, due for routine labs. I have given him the okay to hold off on this for a couple of months until the pandemic restrictions at ease. Social history notable for him retiring. He is missing sailing very much, although his [employer] club is active in a lot of online teaching, and they have even had some simulated races just for fun. His surgical history is reviewed. Right shoulder arthroscopy was done for bone spurs. He did not have rotator cuff repair. Review of Systems   Constitutional: Negative for weight loss. HENT: Positive for hearing loss and tinnitus. Respiratory: Negative. Cardiovascular: Positive for palpitations. Negative for chest pain, leg swelling and PND. Gastrointestinal: Negative. Genitourinary: Positive for frequency. Musculoskeletal: Positive for joint pain. Negative for myalgias. Neurological: Negative for focal weakness. Physical Exam  Vitals signs and nursing note reviewed. Constitutional:       Appearance: He is not ill-appearing. Skin:     General: Skin is warm and dry. Neurological:      Mental Status: He is alert.    Psychiatric: Behavior: Behavior normal.         ASSESSMENT and PLAN  Diagnoses and all orders for this visit:    1. Medicare annual wellness visit, initial    2. Hypercholesteremia  -     METABOLIC PANEL, COMPREHENSIVE  -     CBC WITH AUTOMATED DIFF  -     LIPID PANEL  -     TSH 3RD GENERATION    3. Benign prostatic hyperplasia with urinary frequency  -     URINALYSIS W/ RFLX MICROSCOPIC    4. PVC (premature ventricular contraction)- See cardiologist as directed.      5. Screening for prostate cancer  -     PSA SCREENING (SCREENING)

## 2020-05-07 NOTE — PROGRESS NOTES
This is an Initial Medicare Annual Wellness Exam (AWV) (Performed 12 months after IPPE or effective date of Medicare Part B enrollment, Once in a lifetime)    Consent: Alisia Garcia, who was seen by synchronous (real-time) audio-video technology, and/or his healthcare decision maker, is aware that this patient-initiated, Telehealth encounter on 5/7/2020 is a billable service. While AWVs are fully covered by Medicare, any services rendered on this date that are not included in an AWV are subject to additional billing, with coverage as determined by his insurance carrier. He is aware that he may receive a bill for any such additional services and has provided verbal consent to proceed: Yes. I have reviewed the patient's medical history in detail and updated the computerized patient record.      History     Patient Active Problem List   Diagnosis Code    Mitral prolapse I34.1    Hypercholesteremia E78.00    Tinnitus of both ears H93.13    Urticaria L50.9    Calculus of kidney N20.0    Insomnia, unspecified G47.00    Elevated blood pressure reading without diagnosis of hypertension R03.0    Proteinuria R80.9    Attention deficit disorder without mention of hyperactivity F98.8    Encounter for long-term (current) use of other medications Z79.899    Unspecified vitamin D deficiency E55.9    Erectile dysfunction N52.9    Advanced directives, counseling/discussion Z71.89    PVC (premature ventricular contraction) I49.3     Past Medical History:   Diagnosis Date    Arrhythmia     PVCs    Calculus of kidney 2011    Depression     Hypercholesterolemia     Hyperlipidemia     Hypertension     marginally high readings per pt - high 130s + per pt    Kidney stone     Mitral valve prolapse     Sleep apnea     mild - uses mouth piece      Past Surgical History:   Procedure Laterality Date    COLONOSCOPY N/A 6/5/2019    COLONOSCOPY performed by Dax Toledo MD at [facility]    ENDOSCOPY, COLON, DIAGNOSTIC  2005    polyp- dr Bryson Greene, 2012, 2017    HX GI      COLONOSCOPY    HX HEENT      uvulectomy    HX HEENT      wisdom teeth removed    HX VASECTOMY       Current Outpatient Medications   Medication Sig Dispense Refill    meloxicam (MOBIC) 7.5 mg tablet Take 7.5 mg by mouth daily.  sildenafil citrate (VIAGRA) 100 mg tablet Take 1 Tab by mouth as needed for Erectile Dysfunction. Fill as generic. 8 Tab 0    amLODIPine (NORVASC) 5 mg tablet TAKE 1 TABLET BY MOUTH EVERY DAY 90 Tab 1    flecainide (TAMBOCOR) 50 mg tablet TAKE 1/2 TABLET BY MOUTH IN THE AM AND 1 TAB IN THE EVENING 135 Tab 1    atorvastatin (LIPITOR) 20 mg tablet Take 1 Tab by mouth every evening. 90 Tab 3    finasteride (PROSCAR) 5 mg tablet Take 5 mg by mouth every evening.  tamsulosin (FLOMAX) 0.4 mg capsule Take 0.4 mg by mouth every evening.        No Known Allergies    Family History   Problem Relation Age of Onset    Heart Disease Mother     Cancer Mother         bladder    Heart Failure Mother         CHF    Stroke Father     No Known Problems Sister     No Known Problems Brother     No Known Problems Daughter     No Known Problems Daughter     No Known Problems Son     No Known Problems Son     No Known Problems Son     Anesth Problems Neg Hx      Social History     Tobacco Use    Smoking status: Former Smoker     Packs/day: 0.50     Years: 12.00     Pack years: 6.00     Types: Cigarettes     Last attempt to quit: 1975     Years since quittin.0    Smokeless tobacco: Never Used    Tobacco comment: quit 45 yrs ago - was an occasionally smoker in college/law school   Substance Use Topics    Alcohol use: Yes     Comment: glass of wine a few times a week if that        Depression Risk Factor Screening:     3 most recent PHQ Screens 2020   Little interest or pleasure in doing things Not at all   Feeling down, depressed, irritable, or hopeless Not at all   Total Score PHQ 2 0 Alcohol Risk Factor Screening (MALE > 65): Do you average more 1 drink per night or more than 7 drinks a week: No, 5 / wk    In the past three months have you have had more than 4 drinks containing alcohol on one occasion: No      Functional Ability and Level of Safety:   Hearing: loss is mild    Activities of Daily Living: The home contains: no safety equipment. Patient does total self care     Ambulation: with no difficulty    Fall Risk:  Fall Risk Assessment, last 12 mths 5/7/2020   Able to walk? Yes   Fall in past 12 months? No   Fall with injury? -   Number of falls in past 12 months -   Fall Risk Score -       Abuse Screen:  Patient is not abused    Cognitive Screening   Has your family/caregiver stated any concerns about your memory: no      Patient Care Team   Patient Care Team:  Deedee Tomlin MD as PCP - General (Internal Medicine)  Deedee Tomlin MD as PCP - [facility] Provider  Manuel Donnelly MD (Cardiology)    Assessment/Plan   Education and counseling provided:  Are appropriate based on today's review and evaluation    Diagnoses and all orders for this visit:    1. Hypercholesteremia    2. Benign prostatic hyperplasia with urinary frequency    3. PVC (premature ventricular contraction)    4. Medicare annual wellness visit, initial         Health Maintenance Due   Topic Date Due    AAA Screening 73-69 YO Male Smoking Patients  05/28/2013    GLAUCOMA SCREENING Q2Y  03/01/2020    Lipid Screen  05/08/2020         Peder Median is a 68 y.o. male who was evaluated by an audio-video encounter for concerns as above. Patient identification was verified prior to start of the visit. A caregiver was present when appropriate. Due to this being a TeleHealth encounter (During [place] [id number] public health emergency), evaluation of the following organ systems was limited: Vitals/Constitutional/EENT/Resp/CV/GI//MS/Neuro/Skin/Heme-Lymph-Imm.   Pursuant to the emergency declaration under the [facility], [address] [place] [address] [facility] authority and the [employer] Act, this Virtual Visit was conducted, with patient's (and/or legal guardian's) consent, to reduce the patient's risk of exposure to COVID-19 and provide necessary medical care. Services were provided through a synchronous discussion virtually to substitute for in-person clinic visit. I was in the office. The patient was at home.         Annabel Crane MD

## 2020-05-07 NOTE — PATIENT INSTRUCTIONS
Medicare Wellness Visit, Male The best way to live healthy is to have a lifestyle where you eat a well-balanced diet, exercise regularly, limit alcohol use, and quit all forms of tobacco/nicotine, if applicable. Regular preventive services are another way to keep healthy. Preventive services (vaccines, screening tests, monitoring & exams) can help personalize your care plan, which helps you manage your own care. Screening tests can find health problems at the earliest stages, when they are easiest to treat. [facility] follows the current, evidence-based guidelines published by the [facility] Charlie Ruddy ([facility]) when recommending preventive services for our patients. Because we follow these guidelines, sometimes recommendations change over time as research supports it. (For example, a prostate screening blood test is no longer routinely recommended for men with no symptoms). Of course, you and your doctor may decide to screen more often for some diseases, based on your risk and co-morbidities (chronic disease you are already diagnosed with). Preventive services for you include: - Medicare offers their members a free annual wellness visit, which is time for you and your primary care provider to discuss and plan for your preventive service needs. Take advantage of this benefit every year! 
-All adults over age 72 should receive the recommended pneumonia vaccines. Current USPSTF guidelines recommend a series of two vaccines for the best pneumonia protection.  
-All adults should have a flu vaccine yearly and tetanus vaccine every 10 years. 
-All adults age 48 and older should receive the shingles vaccines (series of two vaccines).       
-All adults age 38-68 who are overweight should have a diabetes screening test once every three years.  
-Other screening tests & preventive services for persons with diabetes include: an eye exam to screen for diabetic retinopathy, a kidney function test, a foot exam, and stricter control over your cholesterol.  
-Cardiovascular screening for adults with routine risk involves an electrocardiogram (ECG) at intervals determined by the provider.  
-Colorectal cancer screening should be done for adults age 54-65 with no increased risk factors for colorectal cancer. There are a number of acceptable methods of screening for this type of cancer. Each test has its own benefits and drawbacks. Discuss with your provider what is most appropriate for you during your annual wellness visit. The different tests include: colonoscopy (considered the best screening method), a fecal occult blood test, a fecal DNA test, and sigmoidoscopy. 
-All adults born between [facility] should be screened once for Hepatitis C. 
-An Abdominal Aortic Aneurysm (AAA) Screening is recommended for men age 73-68 who has ever smoked in their lifetime. Here is a list of your current Health Maintenance items (your personalized list of preventive services) with a due date: 
Health Maintenance Due Topic Date Due  
 AAA Screening  05/28/2013  Glaucoma Screening   03/01/2020  Cholesterol Test   05/08/2020

## 2020-06-08 ENCOUNTER — TELEPHONE (OUTPATIENT)
Dept: INTERNAL MEDICINE CLINIC | Age: 70
End: 2020-06-08

## 2020-06-08 ENCOUNTER — VIRTUAL VISIT (OUTPATIENT)
Dept: INTERNAL MEDICINE CLINIC | Age: 70
End: 2020-06-08
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
## 2020-06-08 DIAGNOSIS — S30.861A INSECT BITE OF ABDOMINAL WALL, INITIAL ENCOUNTER: Primary | ICD-10-CM

## 2020-06-08 NOTE — TELEPHONE ENCOUNTER
----- Message from Isatu Elizabeth sent at 6/7/2020 11:52 AM EDT -----  Regarding: Non-Urgent Medical Question  Contact: [phone]  Shan Almonte:  [facility] [employer] [facility] all's well. Yesterday afternoon I was bitten by a deer tick. Immediately after I felt the bite (and found it), I removed the tick (which I have kept in a tightly closed plastic bag in case there is any reason to see it or test it) and doused the spot with 91% alcohol. This morning the bite site is slightly red, not particularly itchy or otherwise remarkable. I have attached a picture. Is there anything that you would recommend other than keeping an eye on it? Thanks.     Vineet Don  6/7/2020

## 2020-06-08 NOTE — PROGRESS NOTES
Joaquim Chau is a 65 y.o. male who was seen by synchronous (real-time) audio-video technology on 6/8/2020. He confirmed that, for purposes of billing, this is a virtual visit with his provider for which we will submit a claim for reimbursement to his insurance company. He is aware that he will be responsible for any copays, coinsurance amounts or other amounts not covered by his insurance company. Do you accept - YES    This visit was completed was completed virtually using Doxy. Me      Subjective: Joaquim Chau was seen for Insect Bite      -patient was working out in his yard over the weekend. Pulled a tick off of his right hip on Saturday.(2 days ago). Slightly red and pruritic. Has been cleaning with alcohol. Prior to Admission medications    Medication Sig Start Date End Date Taking? Authorizing Provider   meloxicam (MOBIC) 7.5 mg tablet Take 7.5 mg by mouth daily. 4/21/20  Yes Provider, Historical   sildenafil citrate (VIAGRA) 100 mg tablet Take 1 Tab by mouth as needed for Erectile Dysfunction. Fill as generic. 1/28/20  Yes [facility] MD   amLODIPine (NORVASC) 5 mg tablet TAKE 1 TABLET BY MOUTH EVERY DAY 12/19/19  Yes Shira Rodriguez MD   flecainide (TAMBOCOR) 50 mg tablet TAKE 1/2 TABLET BY MOUTH IN THE AM AND 1 TAB IN THE EVENING 12/19/19  Yes Shira Rodriguez MD   atorvastatin (LIPITOR) 20 mg tablet Take 1 Tab by mouth every evening. 10/3/19  Yes Shira Rodriguez MD   finasteride (PROSCAR) 5 mg tablet Take 5 mg by mouth every evening. Yes Provider, Historical   tamsulosin (FLOMAX) 0.4 mg capsule Take 0.4 mg by mouth every evening.    Yes Provider, Historical     No Known Allergies    Patient Active Problem List   Diagnosis Code    Mitral prolapse I34.1    Hypercholesteremia E78.00    Tinnitus of both ears H93.13    Urticaria L50.9    Calculus of kidney N20.0    Insomnia, unspecified G47.00    Elevated blood pressure reading without diagnosis of hypertension R03.0    Proteinuria R80.9    Attention deficit disorder without mention of hyperactivity F98.8    Encounter for long-term (current) use of other medications Z79.899    Unspecified vitamin D deficiency E55.9    Erectile dysfunction N52.9    Advanced directives, counseling/discussion Z71.89    PVC (premature ventricular contraction) I49.3          ROS - per HPI      Objective:     General: alert, cooperative, no distress   Mental  status: pe mental status_general use: normal mood, behavior, speech, dress, motor activity, and thought processes, able to follow commands   Eyes: EOM intact, normal sclera   Mouth: not examined   Neck: no visualized mass   Resp: PULM - obs findings: normal effort and no respiratory distress   Neuro: neuro - obs: no gross deficits   Musculoskeletal: normal ROM of neck   Skin: Right hip - small raised pink lesion with small speck of dark brown center. Psychiatric: normal affect, no hallucinations       Assessment & Plan:   Diagnoses and all orders for this visit:    1. Insect bite of abdominal wall, initial encounter  - tick bite. No bulls eye lesion. Small. Keep area clean and dry. Continue to monitor. CPT Codes 47922-83754 for Established Patients may apply to this Telehealth Visit  Time-based coding, delete if not needed: I spent at least 15 minutes with this established patient, and >50% of the time was spent counseling and/or coordinating care regarding insect bite    Due to this being a TeleHealth evaluation, many elements of the physical examination are unable to be assessed. Pursuant to the emergency declaration under the [facility], [facility] waiver authority and the [employer] Act, this Virtual  Visit was conducted, with patient's consent, to reduce the patient's risk of exposure to COVID-19 and provide continuity of care for an established patient.      Services were provided through a video synchronous discussion virtually to substitute for in-person clinic visit. We discussed the expected course, resolution and complications of the diagnosis(es) in detail. Medication risks, benefits, costs, interactions, and alternatives were discussed as indicated. I advised him to contact the office if his condition worsens, changes or fails to improve as anticipated. He expressed understanding with the diagnosis(es) and plan.      Guerda Campos MD

## 2020-06-08 NOTE — TELEPHONE ENCOUNTER
Spoke with pt = states he was outside Saturday clearing branches. That evening he found a deer tick at his belt line right side. He removed it and used alcohol to area. Bite area is a little larger this morning and itchy. Asked if he has tried any otc med such as benadryl? He has not. Scheduled a telemed visit with Dr Jarett Booker today at 9 am for further evaluation.  Will forward to Dr Jarett Booker.

## 2020-06-14 RX ORDER — FLECAINIDE ACETATE 50 MG/1
TABLET ORAL
Qty: 135 TAB | Refills: 1 | Status: SHIPPED | OUTPATIENT
Start: 2020-06-14 | End: 2020-06-16

## 2020-06-14 RX ORDER — AMLODIPINE BESYLATE 5 MG/1
TABLET ORAL
Qty: 90 TAB | Refills: 1 | Status: SHIPPED | OUTPATIENT
Start: 2020-06-14 | End: 2021-11-16

## 2020-06-15 NOTE — PROGRESS NOTES
Cardiac Electrophysiology VIRTUAL VISIT Note   Pursuant to the emergency declaration under the [facility], [facility] waiver authority and the [employer] Act, this Virtual  Visit was conducted, with patient's consent, to reduce the patient's risk of exposure to COVID-19 and provide continuity of care for an established patient. Services were provided through a video synchronous discussion virtually to substitute for in-person clinic visit. Subjective: Alisia Garcia is a 65 y.o. male patient who is seen virtually via synchronous video for follow up of PVCs. 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  No longer noting associated symptoms. Occasional intermittent dizziness, but mild. Denies chest pain, palpitations, SOB, PND, orthopnea, syncope, or edema. Normal LVEF on last check in 2016 via echo. No ischemia on nuclear stress test in 2016. Last ECG in 10/2019 showed NSR with 1st degree AVB & no significant QRS prolongation. Reports BP well controlled. HR using his device at home, also has iwatch: 40-high 50 ranges   If he exercises he can get to 120 bpm      Previous:  Exercise stress nuclear test (08/2016):   Good exercise capacity. No ischemia on ECG, PVC, but there are frequent PACs  BP was very high 250/100 with exercise. LVEF 69%, no ischemia or infarct. Echo (03/21/2016): LVEF 60-65%, no RWMA. Mild MR. Holter prior to flecainide showed 33K PVCs, mostly unifocal, appeared to originate from outflow tract, but additional PVCs with superior axis, originating from alternate location. Races sailboats.      Mother  of CHF at age 79 yo and father  of CVA 79 yo. 5 children (all in good health), . Half brother may have a pacemaker. Patient Active Problem List    Diagnosis Date Noted    Advanced directives, counseling/discussion 2016    PVC (premature ventricular contraction) 2016    Erectile dysfunction 2016    Encounter for long-term (current) use of other medications 2015    Unspecified vitamin D deficiency 2015    Attention deficit disorder without mention of hyperactivity 2014    Proteinuria 2014    Elevated blood pressure reading without diagnosis of hypertension 2014    Tinnitus of both ears 2013    Urticaria 2013    Calculus of kidney 2013    Insomnia, unspecified 2013    Mitral prolapse 2012    Hypercholesteremia 2012     Current Outpatient Medications   Medication Sig Dispense Refill    flecainide (TAMBOCOR) 50 mg tablet TAKE 1/2 TABLET BY MOUTH IN THE AM AND 1 TAB IN THE EVENING 135 Tab 1    amLODIPine (NORVASC) 5 mg tablet TAKE 1 TABLET BY MOUTH EVERY DAY 90 Tab 1    meloxicam (MOBIC) 7.5 mg tablet Take 7.5 mg by mouth daily.  sildenafil citrate (VIAGRA) 100 mg tablet Take 1 Tab by mouth as needed for Erectile Dysfunction. Fill as generic. 8 Tab 0    atorvastatin (LIPITOR) 20 mg tablet Take 1 Tab by mouth every evening. 90 Tab 3    finasteride (PROSCAR) 5 mg tablet Take 5 mg by mouth every evening.  tamsulosin (FLOMAX) 0.4 mg capsule Take 0.4 mg by mouth every evening.        No Known Allergies  Past Medical History:   Diagnosis Date    Arrhythmia     PVCs    Calculus of kidney     Depression     Hypercholesterolemia     Hyperlipidemia     Hypertension     marginally high readings per pt - high 130s + per pt    Kidney stone     Mitral valve prolapse     Sleep apnea     mild - uses mouth piece     Past Surgical History:   Procedure Laterality Date    COLONOSCOPY N/A 2019    COLONOSCOPY performed by Lori Arcos MD at [facility]. [address], COLON, DIAGNOSTIC  2005    polyp- dr Sherrill Crane, , 2017    HX GI      COLONOSCOPY    HX HEENT      uvulectomy    HX HEENT      wisdom teeth removed    HX ORTHOPAEDIC Right 2019    shoulder arthro    HX VASECTOMY  2002     Family History   Problem Relation Age of Onset    Heart Disease Mother     Cancer Mother         bladder    Heart Failure Mother         CHF    Stroke Father     No Known Problems Sister     No Known Problems Brother     No Known Problems Daughter     No Known Problems Daughter     No Known Problems Son     No Known Problems Son     No Known Problems Son     Anesth Problems Neg Hx      Social History     Tobacco Use    Smoking status: Former Smoker     Packs/day: 0.50     Years: 12.00     Pack years: 6.00     Types: Cigarettes     Last attempt to quit: 1975     Years since quittin.1    Smokeless tobacco: Never Used    Tobacco comment: quit 45 yrs ago - was an occasionally smoker in college/law school   Substance Use Topics    Alcohol use: Yes     Comment: glass of wine a few times a week if that         Review of Systems:   Constitutional: Negative for fever, chills, weight loss, malaise/fatigue. HEENT: Negative for nosebleeds, vision changes. Respiratory: Negative for cough, hemoptysis, sputum production, and wheezing. Cardiovascular:  No chest pain, no orthopnea, claudication, leg swelling, syncope, and PND. Gastrointestinal: Negative for nausea, vomiting, diarrhea, constipation, blood in stool and melena. Genitourinary: Negative for dysuria, and hematuria. Musculoskeletal: Negative for myalgias, arthralgia. Skin: Negative for rash. Heme: Does not bleed or bruise easily. Neurological: Negative for speech change and focal weakness     Objective:   Due to this being a TeleHealth evaluation, many elements of the physical examination are unable to be assessed. General: Well developed, in no acute distress, cooperative and alert  HEENT: Pupils equal/round. No marked JVD visible on video. Respiratory: No audible wheezing, no signs of respiratory distress, lips non cyanotic  Extremities:  No edema  Neuro: A&Ox3, speech clear, no facial droop, answering questions appropriately  Skin: Skin color is normal. Non diaphoretic on visible skin during exam      Assessment/Plan:       ICD-10-CM ICD-9-CM    1. PVC (premature ventricular contraction) I49.3 427.69    2. Sinus bradycardia R00.1 427.89    3. Essential hypertension I10 401.9    4. First degree AV block I44.0 426.11    5. High risk medication use Z79.899 V58.69      Asymptomatic with PVCs on current flecainide low dose. Last ECG in 10/2019 showed no significant QRS prolongation. First degree AVB likely related to his age. Tends to have sinus bradycardia    Last echo & nuclear stress test showed normal LVEF in 2016. No reversible ischemia noted in 2016. No indication for pacer or ablation at this time but I have always been concerned about his evening slow HR to the 40 bpm range and toxicity of flecainide  If he has symptoms then dual chamber pacer is indicated  He will try to use flecainide 25 mg bid and recheck holter in 1-2 months    BP well controlled. EP clinic follow up 6 months    Addendum  holter 7/29/2020  HR 41-92 average 57  3 PVC and 45 PAC  First degree av block   Flecainide is ok to continue and is effective for PVC  No pacemaker indicated for now    Future Appointments   Date Time Provider Humaira Raine   6/16/2020 11:30 AM Savannah Crisostomo  [address]       We discussed the expected course, resolution and complications of the diagnosis(es) in detail. Medication risks, benefits, costs, interactions, and alternatives were discussed as indicated. I advised him to contact the office if his condition worsens, changes or fails to improve as anticipated.  He expressed understanding with the diagnosis(es) and plan. Patient was made aware and verbalized understanding that an appointment will be scheduled for them for a virtual visit and/or office visit within the above time frame. Patient understanding his/her responsibility to call and change time/date if he/she so chooses. Thank you for involving me in this patient's care and please call with further concerns or questions. Gabriel Dickerson M.D.   Electrophysiology/Cardiology  [facility] [place]  [facility] [address], [address] [place] [address] [place] [address]  [phone]

## 2020-06-16 ENCOUNTER — VIRTUAL VISIT (OUTPATIENT)
Dept: CARDIOLOGY CLINIC | Age: 70
End: 2020-06-16

## 2020-06-16 DIAGNOSIS — I49.3 PVC (PREMATURE VENTRICULAR CONTRACTION): Primary | ICD-10-CM

## 2020-06-16 DIAGNOSIS — Z79.899 HIGH RISK MEDICATION USE: ICD-10-CM

## 2020-06-16 DIAGNOSIS — R00.1 SINUS BRADYCARDIA: ICD-10-CM

## 2020-06-16 DIAGNOSIS — I44.0 FIRST DEGREE AV BLOCK: ICD-10-CM

## 2020-06-16 DIAGNOSIS — I10 ESSENTIAL HYPERTENSION: ICD-10-CM

## 2020-06-16 RX ORDER — FLECAINIDE ACETATE 50 MG/1
25 TABLET ORAL 2 TIMES DAILY
Qty: 90 TAB | Refills: 1
Start: 2020-06-16 | End: 2021-09-07 | Stop reason: SDUPTHER

## 2020-06-16 NOTE — PROGRESS NOTES
Verified patient with two types of identifiers. Notified patient to decrease Flecainide to 25 mg BID. Scheduled patient for 24 hour Holter and a 6 month follow. Patient verbalized understanding and will call with any other questions.       Future Appointments   Date Time Provider Humaira Ni   7/28/2020  9:00 [facility]   12/8/2020  8:20 AM Suzanne Doe  [address]

## 2020-06-29 ENCOUNTER — HOSPITAL ENCOUNTER (OUTPATIENT)
Dept: PHYSICAL THERAPY | Age: 70
Discharge: HOME | End: 2020-06-29
Payer: MEDICARE

## 2020-06-29 PROCEDURE — 97110 THERAPEUTIC EXERCISES: CPT | Performed by: PHYSICAL THERAPY ASSISTANT

## 2020-06-29 PROCEDURE — 97161 PT EVAL LOW COMPLEX 20 MIN: CPT | Performed by: PHYSICAL THERAPY ASSISTANT

## 2020-06-29 NOTE — PROGRESS NOTES
[facility] Physical Therapy  [address] [facility], [address] [place] [address]  Phone: [phone]  Fax: [phone]    Plan of Care/Statement of Necessity for Physical Therapy Services  2-15    Patient name: Bertrand Bustillos  : 1950  Provider#: [id number]  Referral source: Stiven Arias (Jody),*      Medical/Treatment Diagnosis: Pain in right shoulder [M25.511]     Prior Hospitalization: see medical history     Comorbidities: see chart  Prior Level of Function: see chart  Medications: Verified on Patient Summary List    Start of Care: 2020      Onset Date: 19       The Plan of Care and following information is based on the information from the initial evaluation. Assessment/ key information: Pt is s/p R SAD that affects his ability to sail, sleep, and function. Pt is a good candidate for therapy. Problem List: pain affecting function, decrease ROM, decrease strength, edema affecting function, decrease ADL/ functional abilitiies, decrease activity tolerance and decrease flexibility/ joint mobility   Treatment Plan may include any combination of the following: Therapeutic exercise, Therapeutic activities, Neuromuscular re-education, Physical agent/modality, Manual therapy and Patient education  Patient / Family readiness to learn indicated by: asking questions  Persons(s) to be included in education: patient (P)  Barriers to Learning/Limitations: None  Patient Goal (s): sail w/o discomfort  Patient Self Reported Health Status: good  Rehabilitation Potential: good    Short Term Goals: To be accomplished in 2 weeks:  Pt will be I w/ HEP in order to take active role in therapy  Pt will demonstrate proper posture in order to decrease strain on shoulder  Pt will demonstrate over 60 deg of shoulder IR ROM in order to better perform ADL's  Long Term Goals:  To be accomplished in 8 weeks:  Pt will demonstrate to sail his boat for over 2 hours w/o increase in pain  Pt will be able to sleep on his R shoulder for over 30 minutes  Frequency / Duration: Patient to be seen 2 times per week for 8 weeks. Patient/ Caregiver education and instruction: self care, activity modification and exercises    [x]  Plan of care has been reviewed with PTA    Certification Period: 6/29/2020-9/20/2020  William Morales, PT, DPT, OCS 6/29/2020   ________________________________________________________________________    I certify that the above Therapy Services are being furnished while the patient is under my care. I agree with the treatment plan and certify that this therapy is necessary.     [de-identified] Signature:____________________  Date:____________Time: _________

## 2020-06-29 NOTE — PROGRESS NOTES
PT INITIAL EVALUATION NOTE - [facility] 2-15    Patient Name: Halima Lara  Date:2020  : 1950  [x]  Patient  Verified  Payor: Dennie Floro / Plan: [place] / Product Type: Medicare /    In time:11:05  Out time:11:55  Total Treatment Time (min): 50  Total Timed Codes (min): 25  1:1 Treatment Time ( [address] only): 25   Visit #: 1     Treatment Area: Pain in right shoulder [M25.511]    SUBJECTIVE  Pain Level (0-10 scale): 1  Any medication changes, allergies to medications, adverse drug reactions, diagnosis change, or new procedure performed?: [] No    [x] Yes (see summary sheet for update)  Subjective:    Pt complains of R shoulder pain s/p SAD on 19. Pt wore sling for 4 weeks and attended therapy for 1 month. Pt states his shoulder was doing ok but still having a lot of pain. Pt received a cortisone injection in March and one in May that have moderately helped. Pt returned to sailing 2 weeks ago and did not have much pain with it. Pt recently purchased a new sail boat that will require him to pull a lever in a circular motion for a long period of time. 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[place] [address]: alone  Pt Goals: \"no pain when sailing\"  Barriers: none  Motivation: good  Substance use: none  FABQ Score: see FOTO  Cognition: A & O x 4        OBJECTIVE/EXAMINATION  Posture:   Forward head and rounded shoulders  Other Observations:  n/a  Functional  and Pinch:  NT  Palpation: TTP over R upper trap and infraspinatus    Right shoulder PROM: 180 deg flexion, ER 90 deg, IR 50 deg    MMT: R sh abd 4/5, ER 4-/5, IR 5/5  Neurological: Reflexes / Sensations: normal  Incision: healing normally  Special Tests: NT due to surgery    25 min Therapeutic Exercise:  [x] See flow sheet :   Rationale: increase ROM, increase strength and improve coordination to improve the patients ability to perform ADL's    With   [x] TE   [] TA   [] neuro   [] other: Patient Education: [x] Review HEP    [] Progressed/Changed HEP based on:   [x] positioning   [x] body mechanics   [] transfers   [x] heat/ice application    [x] other: sleeping posture, sailing, piano     Other Objective/Functional Measures: NT    Pain Level (0-10 scale) post treatment: 1    ASSESSMENT/Changes in Function:     [x]  See Plan of [address] PT, DPT, OCS 6/29/2020

## 2020-07-07 ENCOUNTER — HOSPITAL ENCOUNTER (OUTPATIENT)
Dept: PHYSICAL THERAPY | Age: 70
Discharge: HOME | End: 2020-07-07
Payer: MEDICARE

## 2020-07-07 PROCEDURE — 97140 MANUAL THERAPY 1/> REGIONS: CPT | Performed by: PHYSICAL THERAPY ASSISTANT

## 2020-07-07 PROCEDURE — 97110 THERAPEUTIC EXERCISES: CPT | Performed by: PHYSICAL THERAPY ASSISTANT

## 2020-07-07 NOTE — PROGRESS NOTES
PT DAILY TREATMENT NOTE - [facility] 2-15    Patient Name: Valeria Gruber  Date:2020  : 1950  [x]  Patient  Verified  Payor: [place] MEDICARE / Plan: [place] MEDICARE PART A & B / Product Type: Medicare /    In time:8:45 am  Out time:9:45  Total Treatment Time (min): 60  Total Timed Codes (min): 50  1:1 Treatment Time ( [address] only): 45   Visit #:  2    Treatment Area: Pain in right shoulder [M25.511]    SUBJECTIVE  Pain Level (0-10 scale): 0  Any medication changes, allergies to medications, adverse drug reactions, diagnosis change, or new procedure performed?: [x] No    [] Yes (see summary sheet for update)  Subjective functional status/changes:   [] No changes reported  Pt states he is doing well w/ no pain or soreness. Pt was able to sail for 1.5 hours over the weekend w/o pain.     OBJECTIVE    Modality rationale: decrease edema, decrease inflammation and decrease pain to improve the patients ability to reach overhead   Min Type Additional Details       [] Estim: []Att   []Unatt    []TENS instruct                  []IFC  []Premod   []NMES                     []Other:  []w/US   []w/ice   []w/heat  Position:  Location:       []  Traction: [] Cervical       []Lumbar                       [] Prone          []Supine                       []Intermittent   []Continuous Lbs:  [] before manual  [] after manual  []w/heat    []  Ultrasound: []Continuous   [] Pulsed                       at: []1MHz   []3MHz Location:  W/cm2:    [] Paraffin         Location:   []w/heat   10 [x]  Ice     []  Heat  []  Ice massage Position: supine  Location: R shoulder    []  Laser  []  Other: Position:  Location:      []  Vasopneumatic Device Pressure:       [] lo [] med [] hi   Temperature:      [x] Skin assessment post-treatment:  [x]intact []redness- no adverse reaction    []redness  adverse reaction:     40 min Therapeutic Exercise:  [x] See flow sheet :   Rationale: increase ROM, increase strength and improve coordination to improve the patients ability to perform ADL's    10 min Manual Therapy: passive shoulder ROM, STM to pec major    Rationale: decrease pain, increase ROM, increase tissue extensibility and decrease trigger points to improve the patients ability to perform ADL's            With   [] TE   [] TA   [] neuro   [] other: Patient Education: [x] Review HEP    [] Progressed/Changed HEP based on:   [] positioning   [] body mechanics   [] transfers   [] heat/ice application    [] other:      Other Objective/Functional Measures: NT     Pain Level (0-10 scale) post treatment: 0    ASSESSMENT/Changes in Function:   Pt tolerated there-ex very well today w/o pain. Pt educated on sailing activities and how to modify. Patient will continue to benefit from skilled PT services to modify and progress therapeutic interventions, address functional mobility deficits, address ROM deficits, address strength deficits, analyze and address soft tissue restrictions, analyze and cue movement patterns and analyze and modify body mechanics/ergonomics to attain remaining goals.      []  See Plan of Care  []  See progress note/recertification  []  See Discharge Summary         Progress towards goals / Updated goals:  progressing well    PLAN  [x]  Upgrade activities as tolerated     []  Continue plan of care  []  Update interventions per flow sheet       []  Discharge due to:_  []  Other:_      Peter Ace, PT, DPT, OCS 7/7/2020

## 2020-07-14 ENCOUNTER — HOSPITAL ENCOUNTER (OUTPATIENT)
Dept: PHYSICAL THERAPY | Age: 70
Discharge: HOME | End: 2020-07-14
Payer: MEDICARE

## 2020-07-14 PROCEDURE — 97140 MANUAL THERAPY 1/> REGIONS: CPT | Performed by: PHYSICAL THERAPY ASSISTANT

## 2020-07-14 PROCEDURE — 97110 THERAPEUTIC EXERCISES: CPT | Performed by: PHYSICAL THERAPY ASSISTANT

## 2020-07-14 NOTE — PROGRESS NOTES
PT DAILY TREATMENT NOTE - [facility] 2-15    Patient Name: Stacie Su  Date:2020  : 1950  [x]  Patient  Verified  Payor: [place] MEDICARE / Plan: [place] MEDICARE PART A & B / Product Type: Medicare /    In time:8:00 am  Out time:9:00 am  Total Treatment Time (min): 60  Total Timed Codes (min): 50  1:1 Treatment Time ([facility] only): 50   Visit #:  3    Treatment Area: Pain in right shoulder [M25.511]    SUBJECTIVE  Pain Level (0-10 scale): 0  Any medication changes, allergies to medications, adverse drug reactions, diagnosis change, or new procedure performed?: [x] No    [] Yes (see summary sheet for update)  Subjective functional status/changes:   [] No changes reported  Pt states he was not too sore after last session but woke up w/ some stiffness this morning.   OBJECTIVE    Modality rationale: decrease edema, decrease inflammation and decrease pain to improve the patients ability to reach overhead   Min Type Additional Details       [] Estim: []Att   []Unatt    []TENS instruct                  []IFC  []Premod   []NMES                     []Other:  []w/US   []w/ice   []w/heat  Position:  Location:       []  Traction: [] Cervical       []Lumbar                       [] Prone          []Supine                       []Intermittent   []Continuous Lbs:  [] before manual  [] after manual  []w/heat    []  Ultrasound: []Continuous   [] Pulsed                       at: []1MHz   []3MHz Location:  W/cm2:    [] Paraffin         Location:   []w/heat   10 [x]  Ice     []  Heat  []  Ice massage Position: supine  Location: R shoulder    []  Laser  []  Other: Position:  Location:      []  Vasopneumatic Device Pressure:       [] lo [] med [] hi   Temperature:      [x] Skin assessment post-treatment:  [x]intact []redness- no adverse reaction    []redness  adverse reaction:     40 min Therapeutic Exercise:  [x] See flow sheet :   Rationale: increase ROM, increase strength and improve coordination to improve the patients ability to perform ADL's    10 min Manual Therapy: passive shoulder ROM, STM to pec major    Rationale: decrease pain, increase ROM, increase tissue extensibility and decrease trigger points to improve the patients ability to perform ADL's            With   [] TE   [] TA   [] neuro   [] other: Patient Education: [x] Review HEP    [] Progressed/Changed HEP based on:   [] positioning   [] body mechanics   [] transfers   [] heat/ice application    [] other:      Other Objective/Functional Measures: NT     Pain Level (0-10 scale) post treatment: 0    ASSESSMENT/Changes in Function:   Pt performed IR stretches to help posterior capsule. Pt tolerated there-ex well. Patient will continue to benefit from skilled PT services to modify and progress therapeutic interventions, address functional mobility deficits, address ROM deficits, address strength deficits, analyze and address soft tissue restrictions, analyze and cue movement patterns and analyze and modify body mechanics/ergonomics to attain remaining goals.      []  See Plan of Care  []  See progress note/recertification  []  See Discharge Summary         Progress towards goals / Updated goals:  progressing well    PLAN  [x]  Upgrade activities as tolerated     [x]  Continue plan of care  []  Update interventions per flow sheet       []  Discharge due to:_  []  Other:_      Jaden Calvin, PT, DPT, OCS 7/14/2020

## 2020-07-21 ENCOUNTER — HOSPITAL ENCOUNTER (OUTPATIENT)
Dept: PHYSICAL THERAPY | Age: 70
Discharge: HOME | End: 2020-07-21
Payer: MEDICARE

## 2020-07-21 PROCEDURE — 97140 MANUAL THERAPY 1/> REGIONS: CPT | Performed by: PHYSICAL THERAPY ASSISTANT

## 2020-07-21 PROCEDURE — 97110 THERAPEUTIC EXERCISES: CPT | Performed by: PHYSICAL THERAPY ASSISTANT

## 2020-07-21 NOTE — PROGRESS NOTES
PT DAILY TREATMENT NOTE - [facility] 2-15    Patient Name: Sonam Viera  Date:2020  : 1950  [x]  Patient  Verified  Payor: Fernandaveronica Alicia / Plan: [place] MEDICARE PART A & B / Product Type: Medicare /    In time:8:15 am  Out time:9:20 am  Total Treatment Time (min): 65  Total Timed Codes (min): 55  1:1 Treatment Time ( [address] only): 55   Visit #:  4    Treatment Area: Pain in right shoulder [M25.511]    SUBJECTIVE  Pain Level (0-10 scale): 0  Any medication changes, allergies to medications, adverse drug reactions, diagnosis change, or new procedure performed?: [x] No    [] Yes (see summary sheet for update)  Subjective functional status/changes:   [] No changes reported  Pt states he is doing well w/ no acute pain. Some dull ache after activity or sailing.     OBJECTIVE    Modality rationale: decrease edema, decrease inflammation and decrease pain to improve the patients ability to reach overhead   Min Type Additional Details       [] Estim: []Att   []Unatt    []TENS instruct                  []IFC  []Premod   []NMES                     []Other:  []w/US   []w/ice   []w/heat  Position:  Location:       []  Traction: [] Cervical       []Lumbar                       [] Prone          []Supine                       []Intermittent   []Continuous Lbs:  [] before manual  [] after manual  []w/heat    []  Ultrasound: []Continuous   [] Pulsed                       at: []1MHz   []3MHz Location:  W/cm2:    [] Paraffin         Location:   []w/heat   10 [x]  Ice     []  Heat  []  Ice massage Position: supine  Location: R shoulder    []  Laser  []  Other: Position:  Location:      []  Vasopneumatic Device Pressure:       [] lo [] med [] hi   Temperature:      [x] Skin assessment post-treatment:  [x]intact []redness- no adverse reaction    []redness  adverse reaction:     45 min Therapeutic Exercise:  [x] See flow sheet :   Rationale: increase ROM, increase strength and improve coordination to improve the patients ability to perform ADL's    10 min Manual Therapy: passive shoulder ROM, STM to pec major    Rationale: decrease pain, increase ROM, increase tissue extensibility and decrease trigger points to improve the patients ability to perform ADL's            With   [] TE   [] TA   [] neuro   [] other: Patient Education: [x] Review HEP    [] Progressed/Changed HEP based on:   [] positioning   [] body mechanics   [] transfers   [] heat/ice application    [] other:      Other Objective/Functional Measures: NT     Pain Level (0-10 scale) post treatment: 0    ASSESSMENT/Changes in Function:   Pt added RC strengthening and scapular stability exercises. Pt tolerated them well but was fatigued by end of session. Patient will continue to benefit from skilled PT services to modify and progress therapeutic interventions, address functional mobility deficits, address ROM deficits, address strength deficits, analyze and address soft tissue restrictions, analyze and cue movement patterns and analyze and modify body mechanics/ergonomics to attain remaining goals.      []  See Plan of Care  []  See progress note/recertification  []  See Discharge Summary         Progress towards goals / Updated goals:  progressing well    PLAN  [x]  Upgrade activities as tolerated     [x]  Continue plan of care  []  Update interventions per flow sheet       []  Discharge due to:_  []  Other:_      Jaden Calvin, PT, DPT, OCS 7/21/2020

## 2020-07-28 ENCOUNTER — CLINICAL SUPPORT (OUTPATIENT)
Dept: CARDIOLOGY CLINIC | Age: 70
End: 2020-07-28

## 2020-07-28 ENCOUNTER — HOSPITAL ENCOUNTER (OUTPATIENT)
Dept: LAB | Age: 70
Discharge: HOME | End: 2020-07-28
Payer: MEDICARE

## 2020-07-28 DIAGNOSIS — I10 ESSENTIAL HYPERTENSION: ICD-10-CM

## 2020-07-28 DIAGNOSIS — I44.0 FIRST DEGREE AV BLOCK: ICD-10-CM

## 2020-07-28 DIAGNOSIS — Z79.899 HIGH RISK MEDICATION USE: ICD-10-CM

## 2020-07-28 DIAGNOSIS — I49.3 PVC (PREMATURE VENTRICULAR CONTRACTION): ICD-10-CM

## 2020-07-28 DIAGNOSIS — R00.1 SINUS BRADYCARDIA: ICD-10-CM

## 2020-07-28 PROCEDURE — 80061 LIPID PANEL: CPT

## 2020-07-28 PROCEDURE — 85025 COMPLETE CBC W/AUTO DIFF WBC: CPT

## 2020-07-28 PROCEDURE — 80053 COMPREHEN METABOLIC PANEL: CPT

## 2020-07-28 PROCEDURE — 81001 URINALYSIS AUTO W/SCOPE: CPT

## 2020-07-28 PROCEDURE — 36415 COLL VENOUS BLD VENIPUNCTURE: CPT

## 2020-07-28 PROCEDURE — 84153 ASSAY OF PSA TOTAL: CPT

## 2020-07-28 PROCEDURE — 84443 ASSAY THYROID STIM HORMONE: CPT

## 2020-07-28 PROCEDURE — 83036 HEMOGLOBIN GLYCOSYLATED A1C: CPT

## 2020-07-28 NOTE — PROGRESS NOTES
Applied 24 hr holter per Dr Wright [facility] dx: PVCs. Pt has #[id number]   Chargeable visit.

## 2020-07-29 LAB
ALBUMIN SERPL-MCNC: 4.4 G/DL (ref 3.7–4.7)
ALBUMIN/GLOB SERPL: 2.6 {RATIO} (ref 1.2–2.2)
ALP SERPL-CCNC: 87 IU/L (ref 39–117)
ALT SERPL-CCNC: 23 IU/L (ref 0–44)
APPEARANCE UR: CLEAR
AST SERPL-CCNC: 24 IU/L (ref 0–40)
BASOPHILS # BLD AUTO: 0.1 X10E3/UL (ref 0–0.2)
BASOPHILS NFR BLD AUTO: 1 %
BILIRUB SERPL-MCNC: 0.4 MG/DL (ref 0–1.2)
BILIRUB UR QL STRIP: NEGATIVE
BUN SERPL-MCNC: 14 MG/DL (ref 8–27)
BUN/CREAT SERPL: 17 (ref 10–24)
CALCIUM SERPL-MCNC: 9.7 MG/DL (ref 8.6–10.2)
CHLORIDE SERPL-SCNC: 106 MMOL/L (ref 96–106)
CHOLEST SERPL-MCNC: 130 MG/DL (ref 100–199)
CO2 SERPL-SCNC: 25 MMOL/L (ref 20–29)
COLOR UR: YELLOW
CREAT SERPL-MCNC: 0.82 MG/DL (ref 0.76–1.27)
EOSINOPHIL # BLD AUTO: 0.1 X10E3/UL (ref 0–0.4)
EOSINOPHIL NFR BLD AUTO: 3 %
ERYTHROCYTE [DISTWIDTH] IN BLOOD BY AUTOMATED COUNT: 12.9 % (ref 11.6–15.4)
GLOBULIN SER CALC-MCNC: 1.7 G/DL (ref 1.5–4.5)
GLUCOSE SERPL-MCNC: 106 MG/DL (ref 65–99)
GLUCOSE UR QL: NEGATIVE
HCT VFR BLD AUTO: 46.7 % (ref 37.5–51)
HDLC SERPL-MCNC: 46 MG/DL
HGB BLD-MCNC: 15.9 G/DL (ref 13–17.7)
HGB UR QL STRIP: NEGATIVE
IMM GRANULOCYTES # BLD AUTO: 0 X10E3/UL (ref 0–0.1)
IMM GRANULOCYTES NFR BLD AUTO: 0 %
KETONES UR QL STRIP: NEGATIVE
LDLC SERPL CALC-MCNC: 69 MG/DL (ref 0–99)
LEUKOCYTE ESTERASE UR QL STRIP: NEGATIVE
LYMPHOCYTES # BLD AUTO: 1 X10E3/UL (ref 0.7–3.1)
LYMPHOCYTES NFR BLD AUTO: 22 %
MCH RBC QN AUTO: 32.4 PG (ref 26.6–33)
MCHC RBC AUTO-ENTMCNC: 34 G/DL (ref 31.5–35.7)
MCV RBC AUTO: 95 FL (ref 79–97)
MICRO URNS: NORMAL
MONOCYTES # BLD AUTO: 0.5 X10E3/UL (ref 0.1–0.9)
MONOCYTES NFR BLD AUTO: 11 %
NEUTROPHILS # BLD AUTO: 2.8 X10E3/UL (ref 1.4–7)
NEUTROPHILS NFR BLD AUTO: 63 %
NITRITE UR QL STRIP: NEGATIVE
PH UR STRIP: 5.5 [PH] (ref 5–7.5)
PLATELET # BLD AUTO: 193 X10E3/UL (ref 150–450)
POTASSIUM SERPL-SCNC: 5.1 MMOL/L (ref 3.5–5.2)
PROT SERPL-MCNC: 6.1 G/DL (ref 6–8.5)
PROT UR QL STRIP: NEGATIVE
PSA SERPL-MCNC: 1.1 NG/ML (ref 0–4)
RBC # BLD AUTO: 4.9 X10E6/UL (ref 4.14–5.8)
SODIUM SERPL-SCNC: 145 MMOL/L (ref 134–144)
SP GR UR: 1.03 (ref 1–1.03)
TRIGL SERPL-MCNC: 75 MG/DL (ref 0–149)
TSH SERPL DL<=0.005 MIU/L-ACNC: 1.98 UIU/ML (ref 0.45–4.5)
UROBILINOGEN UR STRIP-MCNC: 0.2 MG/DL (ref 0.2–1)
VLDLC SERPL CALC-MCNC: 15 MG/DL (ref 5–40)
WBC # BLD AUTO: 4.4 X10E3/UL (ref 3.4–10.8)

## 2020-07-30 ENCOUNTER — HOSPITAL ENCOUNTER (OUTPATIENT)
Dept: PHYSICAL THERAPY | Age: 70
Discharge: HOME | End: 2020-07-30
Payer: MEDICARE

## 2020-07-30 PROCEDURE — 97140 MANUAL THERAPY 1/> REGIONS: CPT | Performed by: PHYSICAL THERAPY ASSISTANT

## 2020-07-30 PROCEDURE — 97110 THERAPEUTIC EXERCISES: CPT | Performed by: PHYSICAL THERAPY ASSISTANT

## 2020-07-30 NOTE — PROGRESS NOTES
PT DAILY TREATMENT NOTE - [facility] 2-15    Patient Name: Eduard Lobo  Date:2020  : 1950  [x]  Patient  Verified  Payor: VA MEDICARE / Plan: VA MEDICARE PART A & B / Product Type: Medicare /    In time:8:20 am  Out time:9:05 am  Total Treatment Time (min): 45  Total Timed Codes (min): 45  1:1 Treatment Time ( [address] only): 45   Visit #:  5    Treatment Area: Pain in right shoulder [M25.511]    SUBJECTIVE  Pain Level (0-10 scale): 0  Any medication changes, allergies to medications, adverse drug reactions, diagnosis change, or new procedure performed?: [x] No    [] Yes (see summary sheet for update)  Subjective functional status/changes:   [] No changes reported  Pt states he is still doing well but does notice stiffness and tightness at end ranges of motion.     OBJECTIVE    Modality rationale: decrease edema, decrease inflammation and decrease pain to improve the patients ability to reach overhead   Min Type Additional Details       [] Estim: []Att   []Unatt    []TENS instruct                  []IFC  []Premod   []NMES                     []Other:  []w/US   []w/ice   []w/heat  Position:  Location:       []  Traction: [] Cervical       []Lumbar                       [] Prone          []Supine                       []Intermittent   []Continuous Lbs:  [] before manual  [] after manual  []w/heat    []  Ultrasound: []Continuous   [] Pulsed                       at: []1MHz   []3MHz Location:  W/cm2:    [] Paraffin         Location:   []w/heat   NT [x]  Ice     []  Heat  []  Ice massage Position: supine  Location: R shoulder    []  Laser  []  Other: Position:  Location:      []  Vasopneumatic Device Pressure:       [] lo [] med [] hi   Temperature:      [x] Skin assessment post-treatment:  [x]intact []redness- no adverse reaction    []redness  adverse reaction:     35 min Therapeutic Exercise:  [x] See flow sheet :   Rationale: increase ROM, increase strength and improve coordination to improve the patients ability to perform ADL's    10 min Manual Therapy: passive shoulder ROM, STM to pec major    Rationale: decrease pain, increase ROM, increase tissue extensibility and decrease trigger points to improve the patients ability to perform ADL's            With   [] TE   [] TA   [] neuro   [] other: Patient Education: [x] Review HEP    [] Progressed/Changed HEP based on:   [] positioning   [] body mechanics   [] transfers   [] heat/ice application    [] other:      Other Objective/Functional Measures:   R shoulder PROM: flexion 165 deg w/ pain, ER 90 deg, IR to T10  TTP over R upper trap due to mild hiking of shoulder   R shoulder MMT: ER 4+/5, IR 5/5, abd 4/5    Pain Level (0-10 scale) post treatment: 0    ASSESSMENT/Changes in Function:   Pt has made good progress over the past 4 weeks. Pt has greatly improved in function and shoulder mobility. Pt demonstrates good strength and ROM for this stage of recovery. Pt will be seen for one more visit and then d/c'd to HEP. Patient will continue to benefit from skilled PT services to modify and progress therapeutic interventions, address functional mobility deficits, address ROM deficits, address strength deficits, analyze and address soft tissue restrictions, analyze and cue movement patterns and analyze and modify body mechanics/ergonomics to attain remaining goals.      []  See Plan of Care  [x]  See progress note/recertification  []  See Discharge Summary         Progress towards goals / Updated goals:  progressing well    PLAN  [x]  Upgrade activities as tolerated     [x]  Continue plan of care  []  Update interventions per flow sheet       []  Discharge due to:_  []  Other:_      Aviva Duron, PT, DPT, OCS 7/30/2020

## 2020-07-30 NOTE — PROGRESS NOTES
[facility]   [address] [facility], [address] [place] [address]  Phone: [phone] Fax: [phone]    Progress Note    Name: Danilo Puente   : 1950   MD: Hosey Dakin, Julious(Lydia) P,*       Treatment Diagnosis: Pain in right shoulder [M25.511]  Start of Care: 2020    Visits from Start of Care: 5  Missed Visits: 0    Objective/Functional Measures:   R shoulder PROM: flexion 165 deg w/ pain, ER 90 deg, IR to T10  TTP over R upper trap due to mild hiking of shoulder   R shoulder MMT: ER 4+/5, IR 5/5, abd 4/5    Pain Level (0-10 scale) post treatment: 0    ASSESSMENT/Changes in Function:   Pt has made good progress over the past 4 weeks. Pt has greatly improved in function and shoulder mobility. Pt demonstrates good strength and ROM for this stage of recovery. Pt will be seen for one more visit and then d/c'd to HEP. Tony Morales, PT, DPT, OCS 2020     ________________________________________________________________________  NOTE TO PHYSICIAN:  Please complete the following and fax to: [facility]: Fax: [phone]  . Retain this original for your records. If you are unable to process this request in 24 hours, please contact our office.        ____ I have read the above report and request that my patient continue therapy with the following changes/special instructions:  ____ I have read the above report and request that my patient be discharged from therapy    Physician's Signature:_________________ Date:___________Time:__________

## 2020-07-31 LAB
HBA1C MFR BLD: 5.1 % (ref 4.8–5.6)
SPECIMEN STATUS REPORT, ROLRST: NORMAL

## 2020-08-06 ENCOUNTER — TELEPHONE (OUTPATIENT)
Dept: CARDIOLOGY CLINIC | Age: 70
End: 2020-08-06

## 2020-08-06 NOTE — TELEPHONE ENCOUNTER
Called pt two patient identifiers confirmed. Notified pt olter results and Dr. Jenny Buck recommendations. Pt verbalized understanding of information discussed w/ no further questions at this time.

## 2020-08-06 NOTE — TELEPHONE ENCOUNTER
----- Message from Donnie Tesfaye MD sent at 7/30/2020  3:16 PM EDT -----  holter 7/29/2020  HR 41-92 average 57  3 PVC and 45 PAC  First degree av block   Flecainide is ok to continue and is effective for PVC  No pacemaker indicated for now

## 2020-08-13 ENCOUNTER — HOSPITAL ENCOUNTER (OUTPATIENT)
Dept: PHYSICAL THERAPY | Age: 70
Discharge: HOME | End: 2020-08-13
Payer: MEDICARE

## 2020-08-13 PROCEDURE — 97110 THERAPEUTIC EXERCISES: CPT | Performed by: PHYSICAL THERAPY ASSISTANT

## 2020-08-13 PROCEDURE — 97140 MANUAL THERAPY 1/> REGIONS: CPT | Performed by: PHYSICAL THERAPY ASSISTANT

## 2020-08-13 NOTE — PROGRESS NOTES
PT DAILY TREATMENT NOTE - [facility] 2-15    Patient Name: Yaneth Jay  Date:2020  : 1950  [x]  Patient  Verified  Payor: VA MEDICARE / Plan: VA MEDICARE PART A & B / Product Type: Medicare /    In time:9:15 am  Out time:10:05 am  Total Treatment Time (min): 55  Total Timed Codes (min): 45  1:1 Treatment Time ( [address] only): 45   Visit #:  6    Treatment Area: Pain in right shoulder [M25.511]    SUBJECTIVE  Pain Level (0-10 scale): 0  Any medication changes, allergies to medications, adverse drug reactions, diagnosis change, or new procedure performed?: [x] No    [] Yes (see summary sheet for update)  Subjective functional status/changes:   [] No changes reported  Pt states he is having some soreness but overall his shoulder is doing well.     OBJECTIVE    Modality rationale: decrease edema, decrease inflammation and decrease pain to improve the patients ability to reach overhead   Min Type Additional Details       [] Estim: []Att   []Unatt    []TENS instruct                  []IFC  []Premod   []NMES                     []Other:  []w/US   []w/ice   []w/heat  Position:  Location:       []  Traction: [] Cervical       []Lumbar                       [] Prone          []Supine                       []Intermittent   []Continuous Lbs:  [] before manual  [] after manual  []w/heat    []  Ultrasound: []Continuous   [] Pulsed                       at: []1MHz   []3MHz Location:  W/cm2:    [] Paraffin         Location:   []w/heat   10 [x]  Ice     []  Heat  []  Ice massage Position: supine  Location: R shoulder    []  Laser  []  Other: Position:  Location:      []  Vasopneumatic Device Pressure:       [] lo [] med [] hi   Temperature:      [x] Skin assessment post-treatment:  [x]intact []redness- no adverse reaction    []redness  adverse reaction:     35 min Therapeutic Exercise:  [x] See flow sheet :   Rationale: increase ROM, increase strength and improve coordination to improve the patients ability to perform ADL's    10 min Manual Therapy: passive shoulder ROM, STM to pec major    Rationale: decrease pain, increase ROM, increase tissue extensibility and decrease trigger points to improve the patients ability to perform ADL's            With   [] TE   [] TA   [] neuro   [] other: Patient Education: [x] Review HEP    [] Progressed/Changed HEP based on:   [] positioning   [] body mechanics   [] transfers   [] heat/ice application    [] other:      Other Objective/Functional Measures:   See previous note    Pain Level (0-10 scale) post treatment: 0    ASSESSMENT/Changes in Function:   Pt has made very good progress and given an updated HEP. Pt will be d/c'd from our care at this time.      []  See Plan of Care  []  See progress note/recertification  [x]  See Discharge Summary         Progress towards goals / Updated goals:  See note    PLAN  []  Upgrade activities as tolerated     []  Continue plan of care  []  Update interventions per flow sheet       [x]  Discharge due to:_met goals  []  Other:_      Lara Mcneal, PT, DPT, OCS 8/13/2020

## 2020-09-03 RX ORDER — ATORVASTATIN CALCIUM 20 MG/1
TABLET, FILM COATED ORAL
Qty: 90 TAB | Refills: 3 | Status: SHIPPED | OUTPATIENT
Start: 2020-09-03 | End: 2021-04-13 | Stop reason: SDUPTHER

## 2020-09-03 NOTE — TELEPHONE ENCOUNTER
Request for Atorvastatin 20 mg daily. Last office visit 6/16/20, next office visit 12/8/20. Last Lipid Panel 7/28/20. Refills per verbal order from Dr. Edith Tejada.

## 2020-12-08 ENCOUNTER — OFFICE VISIT (OUTPATIENT)
Dept: CARDIOLOGY CLINIC | Age: 70
End: 2020-12-08
Payer: MEDICARE

## 2020-12-08 VITALS
RESPIRATION RATE: 14 BRPM | DIASTOLIC BLOOD PRESSURE: 72 MMHG | WEIGHT: 165 LBS | HEIGHT: 67 IN | BODY MASS INDEX: 25.9 KG/M2 | HEART RATE: 56 BPM | SYSTOLIC BLOOD PRESSURE: 120 MMHG

## 2020-12-08 DIAGNOSIS — I49.3 PVC (PREMATURE VENTRICULAR CONTRACTION): ICD-10-CM

## 2020-12-08 DIAGNOSIS — Z79.899 ENCOUNTER FOR MONITORING FLECAINIDE THERAPY: Primary | ICD-10-CM

## 2020-12-08 DIAGNOSIS — I44.0 FIRST DEGREE AV BLOCK: ICD-10-CM

## 2020-12-08 DIAGNOSIS — R00.1 SINUS BRADYCARDIA: ICD-10-CM

## 2020-12-08 DIAGNOSIS — I10 ESSENTIAL HYPERTENSION: ICD-10-CM

## 2020-12-08 DIAGNOSIS — Z51.81 ENCOUNTER FOR MONITORING FLECAINIDE THERAPY: Primary | ICD-10-CM

## 2020-12-08 PROCEDURE — G0463 HOSPITAL OUTPT CLINIC VISIT: HCPCS | Performed by: INTERNAL MEDICINE

## 2020-12-08 PROCEDURE — 93005 ELECTROCARDIOGRAM TRACING: CPT | Performed by: INTERNAL MEDICINE

## 2020-12-08 PROCEDURE — G8419 CALC BMI OUT NRM PARAM NOF/U: HCPCS | Performed by: INTERNAL MEDICINE

## 2020-12-08 PROCEDURE — 93010 ELECTROCARDIOGRAM REPORT: CPT | Performed by: INTERNAL MEDICINE

## 2020-12-08 PROCEDURE — G8427 DOCREV CUR MEDS BY ELIG CLIN: HCPCS | Performed by: INTERNAL MEDICINE

## 2020-12-08 PROCEDURE — G8432 DEP SCR NOT DOC, RNG: HCPCS | Performed by: INTERNAL MEDICINE

## 2020-12-08 PROCEDURE — G8754 DIAS BP LESS 90: HCPCS | Performed by: INTERNAL MEDICINE

## 2020-12-08 PROCEDURE — 3017F COLORECTAL CA SCREEN DOC REV: CPT | Performed by: INTERNAL MEDICINE

## 2020-12-08 PROCEDURE — 99213 OFFICE O/P EST LOW 20 MIN: CPT | Performed by: INTERNAL MEDICINE

## 2020-12-08 PROCEDURE — G8536 NO DOC ELDER MAL SCRN: HCPCS | Performed by: INTERNAL MEDICINE

## 2020-12-08 PROCEDURE — 1101F PT FALLS ASSESS-DOCD LE1/YR: CPT | Performed by: INTERNAL MEDICINE

## 2020-12-08 PROCEDURE — G8752 SYS BP LESS 140: HCPCS | Performed by: INTERNAL MEDICINE

## 2020-12-08 RX ORDER — SILDENAFIL 100 MG/1
TABLET, FILM COATED ORAL
COMMUNITY
End: 2020-12-29 | Stop reason: SDUPTHER

## 2020-12-08 NOTE — PROGRESS NOTES
Cardiac Electrophysiology OFFICE Note     Subjective: Heidy Lema is a 65 y.o. male patient who presents for follow up of PVCs. Has had PVCs for several years, well controlled with flecainide between 25 mg po bid. No longer noting associated symptoms so he used 25 mg qhs only    ECG today shows sinus bradycardia and first degree av block    He has Apple Watch     Occasional intermittent dizziness, but mild. Denies chest pain, palpitations, SOB, PND, orthopnea, syncope, or edema. Normal LVEF on last check in 2016 via echo. No ischemia on nuclear stress test in 2016. Reports BP well controlled at home      Previous:  Holter (2020): HR 41-92, average 57.  3 PVCs and 45 PACs. First degree AV block. Exercise stress nuclear test (2016): Good exercise capacity. No ischemia on ECG, PVC, but there are frequent PACs. BP was very high 250/100 with exercise. LVEF 69%, no ischemia or infarct. Echo (2016): LVEF 60-65%, no RWMA. Mild MR. Holter prior to flecainide showed 33K PVCs, mostly unifocal, appeared to originate from outflow tract, but additional PVCs with superior axis, originating from alternate location. 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 Mother  of CHF at age 81 yo and father  of CVA 81 yo. 5 children (all in good health), . Half brother may have a pacemaker.        Patient Active Problem List    Diagnosis Date Noted    Advanced directives, counseling/discussion 2016    PVC (premature ventricular contraction) 2016    Erectile dysfunction 2016    Encounter for long-term (current) use of other medications 2015    Unspecified vitamin D deficiency 2015    Attention deficit disorder without mention of hyperactivity 2014    Proteinuria 2014    Elevated blood pressure reading without diagnosis of hypertension 01/30/2014    Tinnitus of both ears 07/30/2013    Urticaria 07/30/2013    Calculus of kidney 07/30/2013    Insomnia, unspecified 07/30/2013    Mitral prolapse 02/07/2012    Hypercholesteremia 02/07/2012     Current Outpatient Medications   Medication Sig Dispense Refill    atorvastatin (LIPITOR) 20 mg tablet TAKE 1 TABLET BY MOUTH EVERY DAY IN THE EVENING 90 Tab 3    Bifidobacterium Infantis (Align) 4 mg cap As directed 14 Cap 0    psyllium husk (Metamucil) 0.4 gram cap As directed 28 Cap 0    flecainide (TAMBOCOR) 50 mg tablet Take 0.5 Tabs by mouth two (2) times a day. 90 Tab 1    amLODIPine (NORVASC) 5 mg tablet TAKE 1 TABLET BY MOUTH EVERY DAY 90 Tab 1    meloxicam (MOBIC) 7.5 mg tablet Take 7.5 mg by mouth daily as needed.  finasteride (PROSCAR) 5 mg tablet Take 5 mg by mouth every evening.  tamsulosin (FLOMAX) 0.4 mg capsule Take 0.4 mg by mouth every evening.        No Known Allergies  Past Medical History:   Diagnosis Date    Arrhythmia     PVCs    Calculus of kidney 2011    Depression     Hypercholesterolemia     Hyperlipidemia     Hypertension     marginally high readings per pt - high 130s + per pt    Kidney stone     Mitral valve prolapse     Sleep apnea     mild - uses mouth piece     Past Surgical History:   Procedure Laterality Date    COLONOSCOPY N/A 6/5/2019    COLONOSCOPY performed by Liberty Church MD at [address] [place] [address], COLON, DIAGNOSTIC  2005    polyp- dr [facility], 2012, 2017    HX GI      COLONOSCOPY    HX HEENT      uvulectomy    HX HEENT      wisdom teeth removed    HX ORTHOPAEDIC Right 12/2019    shoulder arthro    HX VASECTOMY  2002     Family History   Problem Relation Age of Onset    Heart Disease Mother     Cancer Mother         bladder    Heart Failure Mother         CHF    Stroke Father     No Known Problems Sister     No Known Problems Brother     No Known Problems Daughter     No Known Problems Daughter     No Known Problems Son     No Known Problems Son     No Known Problems Son     Anesth Problems Neg Hx      Social History     Tobacco Use    Smoking status: Former Smoker     Packs/day: 0.50     Years: 12.00     Pack years: 6.00     Types: Cigarettes     Last attempt to quit: 1975     Years since quittin.6    Smokeless tobacco: Never Used    Tobacco comment: quit 45 yrs ago - was an occasionally smoker in college/law school   Substance Use Topics    Alcohol use: Yes     Comment: glass of wine a few times a week if that         Review of Systems: All other review of systems otherwise negative. Constitutional: Negative for fever, chills, weight loss, malaise/fatigue. HEENT: Negative for nosebleeds, vision changes. Respiratory: Negative for cough, hemoptysis, sputum production, and wheezing. Cardiovascular:  No chest pain, no orthopnea, claudication, leg swelling, syncope, and PND. Gastrointestinal: Negative for nausea, vomiting, diarrhea, constipation, blood in stool and melena. Genitourinary: Negative for dysuria, and hematuria. Musculoskeletal: Negative for myalgias, arthralgia. Skin: Negative for rash. Heme: Does not bleed or bruise easily. Neurological: Negative for speech change and focal weakness     Objective:     Visit Vitals  /72 (BP 1 Location: Left arm, BP Patient Position: Sitting)   Pulse (!) 56   Resp 14   Ht 5' 7\" (1.702 m)   Wt 165 lb (74.8 kg)   BMI 25.84 kg/m²     Physical Exam:   Constitutional: well-developed and well-nourished. No respiratory distress. Head: Normocephalic and atraumatic. Eyes: Pupils are equal, round  ENT: hearing normal  Neck: supple. No JVD present. Cardiovascular: Normal rate, regular rhythm. Exam reveals no gallop and no friction rub. No murmur heard. Pulmonary/Chest: Effort normal and breath sounds normal. No wheezes. Abdominal: Soft, no tenderness.    Musculoskeletal: Normal ROM & gait   Vasc/lymphatic: No edema. Neurological: alert,oriented. Skin: Skin is warm and dry  Psychiatric: normal mood and affect. Behavior is normal. Judgment and thought content normal.      Assessment/Plan:       ICD-10-CM ICD-9-CM    1. Encounter for monitoring flecainide therapy  Z51.81 V58.83 AMB POC EKG ROUTINE W/ 12 LEADS, INTER & REP    Z79.899 V58.69    2. PVC (premature ventricular contraction)  I49.3 427.69    3. Sinus bradycardia  R00.1 427.89    4. Essential hypertension  I10 401.9    5. First degree AV block  I44.0 426.11        Asymptomatic with PVCs on current flecainide low dose at night only. Last ECG in 10/2019 showed no significant QRS prolongation. First degree AVB is mild and he has cut down on flecainide dose  Tends to have sinus bradycardia    Last echo & nuclear stress test showed normal LVEF in 2016. No reversible ischemia noted in 2016. No indication for pacer or ablation at this time      BP well controlled on norvasc    EP clinic follow up 6 months        Future Appointments   Date Time Provider Humaira Ni   12/8/2020  8:20 AM MD LUIS F Sarkar AMB     Thank you for involving me in this patient's care and please call with further concerns or questions. Tati Ely M.D.   Electrophysiology/Cardiology  [facility]  [address]  [phone]

## 2020-12-29 RX ORDER — SILDENAFIL 100 MG/1
100 TABLET, FILM COATED ORAL
Qty: 20 TAB | Refills: 5 | Status: SHIPPED | OUTPATIENT
Start: 2020-12-29 | End: 2022-08-22 | Stop reason: SDUPTHER

## 2021-01-27 ENCOUNTER — IMMUNIZATION (OUTPATIENT)
Dept: INTERNAL MEDICINE CLINIC | Age: 71
End: 2021-01-27
Payer: MEDICARE

## 2021-01-27 DIAGNOSIS — Z23 ENCOUNTER FOR IMMUNIZATION: Primary | ICD-10-CM

## 2021-01-27 PROCEDURE — 0011A PR IMM ADMN SARSCOV2 100 MCG/0.5 ML 1ST DOSE: CPT | Performed by: FAMILY MEDICINE

## 2021-01-27 PROCEDURE — 91301 COVID-19, MRNA, LNP-S, PF, 100MCG/0.5ML DOSE(MODERNA): CPT | Performed by: FAMILY MEDICINE

## 2021-02-24 ENCOUNTER — IMMUNIZATION (OUTPATIENT)
Dept: INTERNAL MEDICINE CLINIC | Age: 71
End: 2021-02-24
Payer: MEDICARE

## 2021-02-24 DIAGNOSIS — Z23 ENCOUNTER FOR IMMUNIZATION: Primary | ICD-10-CM

## 2021-02-24 PROCEDURE — 0012A COVID-19, MRNA, LNP-S, PF, 100MCG/0.5ML DOSE(MODERNA): CPT | Performed by: FAMILY MEDICINE

## 2021-02-24 PROCEDURE — 91301 COVID-19, MRNA, LNP-S, PF, 100MCG/0.5ML DOSE(MODERNA): CPT | Performed by: FAMILY MEDICINE

## 2021-05-18 ENCOUNTER — HOSPITAL ENCOUNTER (OUTPATIENT)
Dept: LAB | Age: 71
Discharge: HOME | End: 2021-05-18
Payer: MEDICARE

## 2021-05-18 PROCEDURE — 36415 COLL VENOUS BLD VENIPUNCTURE: CPT

## 2021-05-18 PROCEDURE — 80061 LIPID PANEL: CPT

## 2021-05-24 ENCOUNTER — TELEPHONE (OUTPATIENT)
Dept: CARDIOLOGY CLINIC | Age: 71
End: 2021-05-24

## 2021-05-24 DIAGNOSIS — E78.5 HYPERLIPIDEMIA, UNSPECIFIED HYPERLIPIDEMIA TYPE: Primary | ICD-10-CM

## 2021-05-24 NOTE — TELEPHONE ENCOUNTER
----- Message from Gita Quiñones NP sent at 5/24/2021 10:08 AM EDT -----  Triglycerides elevated, had been controlled for the last 3 years. LDL well controlled. Recommend lifestyle changes for initial management, including exercise, Mediterranean diet, fewer starches. Continue atorvastatin for now, recheck lipid panel in 3-6 months.

## 2021-05-24 NOTE — TELEPHONE ENCOUNTER
Verified patient with two types of identifiers. Notified patient of results and NP recommendations. Patient will work on diet. He says he has been exercising frequently. Will mail lab slip for updated labs in 3-6 months. Patient verbalized understanding and will call with any other questions.

## 2021-07-20 ENCOUNTER — OFFICE VISIT (OUTPATIENT)
Dept: CARDIOLOGY CLINIC | Age: 71
End: 2021-07-20
Payer: MEDICARE

## 2021-07-20 VITALS
WEIGHT: 159 LBS | HEART RATE: 60 BPM | HEIGHT: 67 IN | RESPIRATION RATE: 16 BRPM | DIASTOLIC BLOOD PRESSURE: 70 MMHG | BODY MASS INDEX: 24.96 KG/M2 | SYSTOLIC BLOOD PRESSURE: 126 MMHG

## 2021-07-20 DIAGNOSIS — I44.0 FIRST DEGREE AV BLOCK: ICD-10-CM

## 2021-07-20 DIAGNOSIS — I10 ESSENTIAL HYPERTENSION: ICD-10-CM

## 2021-07-20 DIAGNOSIS — I49.3 PVC (PREMATURE VENTRICULAR CONTRACTION): Primary | ICD-10-CM

## 2021-07-20 DIAGNOSIS — Z51.81 ENCOUNTER FOR MONITORING FLECAINIDE THERAPY: ICD-10-CM

## 2021-07-20 DIAGNOSIS — Z79.899 ENCOUNTER FOR MONITORING FLECAINIDE THERAPY: ICD-10-CM

## 2021-07-20 DIAGNOSIS — R00.1 SINUS BRADYCARDIA: ICD-10-CM

## 2021-07-20 PROCEDURE — 93005 ELECTROCARDIOGRAM TRACING: CPT | Performed by: INTERNAL MEDICINE

## 2021-07-20 PROCEDURE — 99214 OFFICE O/P EST MOD 30 MIN: CPT | Performed by: INTERNAL MEDICINE

## 2021-07-20 PROCEDURE — G8432 DEP SCR NOT DOC, RNG: HCPCS | Performed by: INTERNAL MEDICINE

## 2021-07-20 PROCEDURE — G8536 NO DOC ELDER MAL SCRN: HCPCS | Performed by: INTERNAL MEDICINE

## 2021-07-20 PROCEDURE — G8427 DOCREV CUR MEDS BY ELIG CLIN: HCPCS | Performed by: INTERNAL MEDICINE

## 2021-07-20 PROCEDURE — 93010 ELECTROCARDIOGRAM REPORT: CPT | Performed by: INTERNAL MEDICINE

## 2021-07-20 PROCEDURE — 3017F COLORECTAL CA SCREEN DOC REV: CPT | Performed by: INTERNAL MEDICINE

## 2021-07-20 PROCEDURE — G8420 CALC BMI NORM PARAMETERS: HCPCS | Performed by: INTERNAL MEDICINE

## 2021-07-20 PROCEDURE — G0463 HOSPITAL OUTPT CLINIC VISIT: HCPCS | Performed by: INTERNAL MEDICINE

## 2021-07-20 PROCEDURE — 1101F PT FALLS ASSESS-DOCD LE1/YR: CPT | Performed by: INTERNAL MEDICINE

## 2021-07-20 RX ORDER — NITROFURANTOIN 25; 75 MG/1; MG/1
CAPSULE ORAL
Status: ON HOLD | COMMUNITY
Start: 2021-06-25 | End: 2022-10-28

## 2021-07-21 NOTE — PROGRESS NOTES
Cardiac Electrophysiology OFFICE Note     Subjective: Megha Navas is a 66 y.o. male patient, retired , who presents for follow up of PVCs. Has had PVCs for several years, well controlled with flecainide between 25 mg po bid and now qhs. No longer noting associated symptoms      He has lost weight being active. He sails frequently    ECG today shows sinus rhythm and first degree av block    60 bpm on vital signs check is high for him    He has Apple Watch     Denies chest pain, palpitations, SOB, PND, orthopnea, syncope, or edema. Normal LVEF on last check in 2016 via echo. No ischemia on nuclear stress test in 2016. Reports BP well controlled at home      Previous:  Holter (2020): HR 41-92, average 57.  3 PVCs and 45 PACs. First degree AV block. Exercise stress nuclear test (2016): Good exercise capacity. No ischemia on ECG, PVC, but there are frequent PACs. BP was very high 250/100 with exercise. LVEF 69%, no ischemia or infarct. Echo (2016): LVEF 60-65%, no RWMA. Mild MR. Holter prior to flecainide showed 33K PVCs, mostly unifocal, appeared to originate from outflow tract, but additional PVCs with superior axis, originating from alternate location. 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 Mother  of CHF at age 81 yo and father  of CVA 81 yo.   5 children (all in good health), . Half brother may have a pacemaker.        Patient Active Problem List    Diagnosis Date Noted    Advanced directives, counseling/discussion 2016    PVC (premature ventricular contraction) 2016    Erectile dysfunction 2016    Encounter for long-term (current) use of other medications 2015    Unspecified vitamin D deficiency 2015    Attention deficit disorder without mention of hyperactivity 09/30/2014    Proteinuria 07/31/2014    Elevated blood pressure reading without diagnosis of hypertension 01/30/2014    Tinnitus of both ears 07/30/2013    Urticaria 07/30/2013    Calculus of kidney 07/30/2013    Insomnia, unspecified 07/30/2013    Mitral prolapse 02/07/2012    Hypercholesteremia 02/07/2012     Current Outpatient Medications   Medication Sig Dispense Refill    nitrofurantoin, macrocrystal-monohydrate, (MACROBID) 100 mg capsule       atorvastatin (LIPITOR) 20 mg tablet Take 1 Tab by mouth daily. 90 Tab 3    sildenafil citrate (VIAGRA) 100 mg tablet Take 1 Tab by mouth daily as needed for Erectile Dysfunction. 20 Tab 5    flecainide (TAMBOCOR) 50 mg tablet Take 0.5 Tabs by mouth two (2) times a day. (Patient taking differently: Take 25 mg by mouth daily.) 90 Tab 1    amLODIPine (NORVASC) 5 mg tablet TAKE 1 TABLET BY MOUTH EVERY DAY 90 Tab 1    finasteride (PROSCAR) 5 mg tablet Take 5 mg by mouth every evening.  tamsulosin (FLOMAX) 0.4 mg capsule Take 0.4 mg by mouth every evening.  meloxicam (MOBIC) 7.5 mg tablet Take 7.5 mg by mouth daily as needed.  (Patient not taking: Reported on 7/20/2021)       No Known Allergies  Past Medical History:   Diagnosis Date    Arrhythmia     PVCs    Calculus of kidney 2011    Depression     Hypercholesterolemia     Hyperlipidemia     Hypertension     marginally high readings per pt - high 130s + per pt    Kidney stone     Mitral valve prolapse     Sleep apnea     mild - uses mouth piece     Past Surgical History:   Procedure Laterality Date    COLONOSCOPY N/A 6/5/2019    COLONOSCOPY performed by Reema Hsu MD at [address] [place] [address], COLON, DIAGNOSTIC  2005    polyp- dr Doyle Montanez, 2012, 2017    HX GI      COLONOSCOPY    HX HEENT      uvulectomy    HX HEENT      wisdom teeth removed    HX ORTHOPAEDIC Right 12/2019    shoulder arthro    HX VASECTOMY  2002     Family History   Problem Relation Age of Onset  Heart Disease Mother     Cancer Mother         bladder    Heart Failure Mother         CHF    Stroke Father     No Known Problems Sister     No Known Problems Brother     No Known Problems Daughter     No Known Problems Daughter     No Known Problems Son     No Known Problems Son     No Known Problems Son     Anesth Problems Neg Hx      Social History     Tobacco Use    Smoking status: Former Smoker     Packs/day: 0.50     Years: 12.00     Pack years: 6.00     Types: Cigarettes     Quit date: 1975     Years since quittin.2    Smokeless tobacco: Never Used    Tobacco comment: quit 45 yrs ago - was an occasionally smoker in college/law school   Substance Use Topics    Alcohol use: Yes     Comment: glass of wine a few times a week if that         Review of Systems: All other review of systems otherwise negative. Constitutional: Negative for fever, chills, weight loss, malaise/fatigue. HEENT: Negative for nosebleeds, vision changes. Respiratory: Negative for cough, hemoptysis, sputum production, and wheezing. Cardiovascular:  No chest pain, no orthopnea, claudication, leg swelling, syncope, and PND. Gastrointestinal: Negative for nausea, vomiting, diarrhea, constipation, blood in stool and melena. Genitourinary: Negative for dysuria, and hematuria. Musculoskeletal: Negative for myalgias, arthralgia. Skin: Negative for rash. Heme: Does not bleed or bruise easily. Neurological: Negative for speech change and focal weakness     Objective:     Visit Vitals  /70 (BP 1 Location: Left upper arm, BP Patient Position: Sitting)   Pulse 60   Resp 16   Ht 5' 7\" (1.702 m)   Wt 159 lb (72.1 kg)   BMI 24.90 kg/m²     Physical Exam:   Constitutional: well-developed and well-nourished. No respiratory distress. Head: Normocephalic and atraumatic. Eyes: Pupils are equal, round  ENT: hearing normal  Neck: supple. No JVD present. Cardiovascular: Normal rate, regular rhythm.  Exam reveals no gallop and no friction rub. No murmur heard. Pulmonary/Chest: Effort normal and breath sounds normal. No wheezes. Abdominal: Soft, no tenderness. Musculoskeletal: Normal ROM & gait   Vasc/lymphatic: No edema. Neurological: alert,oriented. Skin: Skin is warm and dry  Psychiatric: normal mood and affect. Behavior is normal. Judgment and thought content normal.      Assessment/Plan:       ICD-10-CM ICD-9-CM    1. PVC (premature ventricular contraction)  I49.3 427.69    2. Encounter for monitoring flecainide therapy  Z51.81 V58.83 AMB POC EKG ROUTINE W/ 12 LEADS, INTER & REP    Z79.899 V58.69    3. Sinus bradycardia  R00.1 427.89    4. First degree AV block  I44.0 426.11    5. Essential hypertension  I10 401.9      ECG NSR 60 bpm and first degree av block   No prolonged QRS    Asymptomatic with PVCs on current flecainide low dose at night only. Tends to have sinus bradycardia and first degree av block 250 ms  No bundle branch block    He wants to try off flecainide again   In the past he did the same and had to come back on with flecainide  He did not have enough sx to do ablation yet and he agrees no ablation or pacemaker    Last echo & nuclear stress test showed normal LVEF in 2016. No reversible ischemia noted in 2016. BP well controlled on norvasc    EP clinic follow up 6 months according to his request        Future Appointments   Date Time Provider Humaira Ni   7/21/2022  8:20 AM MD LUIS F Gilmore  AMB     Thank you for involving me in this patient's care and please call with further concerns or questions. Eugene Mo M.D.   Electrophysiology/Cardiology  [facility], [facility]  [phone] , [address]  [address]  [phone]

## 2021-09-07 RX ORDER — FLECAINIDE ACETATE 50 MG/1
25 TABLET ORAL DAILY
Qty: 45 TABLET | Refills: 1 | Status: SHIPPED | OUTPATIENT
Start: 2021-09-07 | End: 2021-09-07

## 2021-09-07 RX ORDER — FLECAINIDE ACETATE 50 MG/1
25 TABLET ORAL DAILY
Qty: 45 TABLET | Refills: 1 | Status: SHIPPED | OUTPATIENT
Start: 2021-09-07 | End: 2022-03-14 | Stop reason: SDUPTHER

## 2021-09-29 NOTE — PROGRESS NOTES
Alexia Bush is a 77 y.o. male who presents for fever and body/joint aches. Fever started today up to 101.4 and body aches x2 days. He denies nasal congestion, sinus pressure, ear pain, sore throat, coughing, abdominal pain, n/v/d. States he saw his urologist today for his BPH and had a normal urinalysis. No urinary symptoms other than BPH symptoms. He has tried tylenol today. He returned from [place] 4 days ago, unsure if he caught something on the plane but does not recall any sick contacts. Past Medical History:  
Diagnosis Date  Calculus of kidney 2011  Depression  Hypercholesterolemia  Hyperlipidemia  Kidney stone  Mitral valve prolapse  Polycystic liver disease Past Surgical History:  
Procedure Laterality Date  ENDOSCOPY, COLON, DIAGNOSTIC  2005  
 polyp- dr Keila Tijerina, 2012, due 15  
 HX HEENT    
 uvulectomy  HX VASECTOMY  2002 Meds:  
Current Outpatient Prescriptions Medication Sig Dispense Refill  atorvastatin (LIPITOR) 20 mg tablet Take 1 Tab by mouth daily. 90 Tab 3  
 sildenafil citrate (VIAGRA) 100 mg tablet Take 1 Tab by mouth as needed. Fill as generic. 8 Tab 0  
 flecainide (TAMBOCOR) 50 mg tablet Take 1 Tab by mouth two (2) times a day. (Patient taking differently: Take 25 mg by mouth two (2) times a day.) 180 Tab 1  
 amLODIPine (NORVASC) 5 mg tablet TAKE 1 TABLET DAILY 90 Tab 1  
 tamsulosin (FLOMAX) 0.4 mg capsule Take 0.4 mg by mouth daily.  aspirin delayed-release 81 mg tablet Take  by mouth daily. Allergies:  
No Known Allergies Smoker: 
History Smoking Status  Former Smoker  Packs/day: 1.00  Years: 12.00  Types: Cigarettes Smokeless Tobacco  
 Never Used ETOH:  
History Alcohol Use  Yes Comment: wine 1-2 glasse per day FH:  
Family History Problem Relation Age of Onset  Heart Disease Mother  Cancer Mother  Stroke Mother  Stroke Father  No Known Problems Sister  No Known Problems Brother ROS: 
General/Constitutional:   No weight loss or weight gain Eyes:   No redness, pruritis, pain, visual changes, swelling, or discharge Ears:    No pain, loss or changes in hearing Nose: No lew congestion or rhinorrea Neck:   No swelling, masses, stiffness, pain, or limited movement Cardiac:    No chest pain Respiratory:  cough GI:   No nausea/vomiting, diarrhea, abdominal pain, bloody or dark stools Skin: No rash Physical Exam: 
Visit Vitals  /61 (BP 1 Location: Left arm, BP Patient Position: Sitting)  Pulse 72  Temp 100.1 °F (37.8 °C) (Oral)  Resp 16  
 Ht 5' 5.5\" (1.664 m)  Wt 166 lb (75.3 kg)  SpO2 95%  BMI 27.2 kg/m2 General: Alert and oriented, in no acute distress. Responds to all questions appropriately. SKIN: No rash. Normal color. HEAD: No sinus tenderness. EYES: Conjunctiva are clear; pupils round and reactive to light. EARS: External normal, canals clear, tympanic membranes normal. 
NOSE: Edema, erythema, no mucous drainage. OROPHARYNX: tonsils absent surgically, no erythema or edema NECK: Supple; no masses; anterior and posterior cervical lymphadenopathy LUNGS: Respirations unlabored; clear to auscultation bilaterally, no wheeze, rales or rhonchi. CARDIOVASCULAR: Regular, rate, and rhythm without murmurs, gallops or rubs. EXTREMITIES: No edema, cyanosis or clubbing. NEUROLOGIC: Speech intact; face symmetrical; moves all extremities equally Rapid influenza negative Assessment: ICD-10-CM ICD-9-CM 1. Viral illness B34.9 079.99   
2. Fever, unspecified fever cause R50.9 780.60 AMB POC ANETA INFLUENZA A/B TEST 3. Body aches R52 780.96 AMB POC ANETA INFLUENZA A/B TEST Febrile illness x1 day with no focal findings on examination other than mild nasal erythema and cervical adenopathy ?viral URI.  No other symptoms and no recent exposure to tick/insect bites. He did return from [place] 4 days ago but does not recall any sick contacts. Advised to monitor fever over weekend, use tylenol/advil prn for fever, follow up with PCP early next week if still elevated or ED sooner if new symptoms or worsening. If you get suddenly worse, go to the nearest hospital Emergency Room Bactrim Counseling:  I discussed with the patient the risks of sulfa antibiotics including but not limited to GI upset, allergic reaction, drug rash, diarrhea, dizziness, photosensitivity, and yeast infections.  Rarely, more serious reactions can occur including but not limited to aplastic anemia, agranulocytosis, methemoglobinemia, blood dyscrasias, liver or kidney failure, lung infiltrates or desquamative/blistering drug rashes.

## 2021-10-19 ENCOUNTER — OFFICE VISIT (OUTPATIENT)
Dept: INTERNAL MEDICINE CLINIC | Age: 71
End: 2021-10-19
Payer: MEDICARE

## 2021-10-19 VITALS
BODY MASS INDEX: 24.48 KG/M2 | DIASTOLIC BLOOD PRESSURE: 73 MMHG | HEIGHT: 67 IN | HEART RATE: 59 BPM | WEIGHT: 156 LBS | OXYGEN SATURATION: 96 % | TEMPERATURE: 96.8 F | RESPIRATION RATE: 16 BRPM | SYSTOLIC BLOOD PRESSURE: 116 MMHG

## 2021-10-19 DIAGNOSIS — R35.0 BENIGN PROSTATIC HYPERPLASIA WITH URINARY FREQUENCY: ICD-10-CM

## 2021-10-19 DIAGNOSIS — I49.3 PVC (PREMATURE VENTRICULAR CONTRACTION): ICD-10-CM

## 2021-10-19 DIAGNOSIS — Z00.00 MEDICARE ANNUAL WELLNESS VISIT, SUBSEQUENT: Primary | ICD-10-CM

## 2021-10-19 DIAGNOSIS — E78.00 HYPERCHOLESTEREMIA: ICD-10-CM

## 2021-10-19 DIAGNOSIS — Z13.31 SCREENING FOR DEPRESSION: ICD-10-CM

## 2021-10-19 DIAGNOSIS — Z12.5 SCREENING FOR PROSTATE CANCER: ICD-10-CM

## 2021-10-19 DIAGNOSIS — N40.1 BENIGN PROSTATIC HYPERPLASIA WITH URINARY FREQUENCY: ICD-10-CM

## 2021-10-19 DIAGNOSIS — E78.5 HYPERLIPIDEMIA, UNSPECIFIED HYPERLIPIDEMIA TYPE: ICD-10-CM

## 2021-10-19 LAB
ALBUMIN SERPL-MCNC: 4 G/DL (ref 3.5–5)
ALBUMIN/GLOB SERPL: 1.4 {RATIO} (ref 1.1–2.2)
ALP SERPL-CCNC: 104 U/L (ref 45–117)
ALT SERPL-CCNC: 48 U/L (ref 12–78)
ANION GAP SERPL CALC-SCNC: 3 MMOL/L (ref 5–15)
AST SERPL-CCNC: 40 U/L (ref 15–37)
BASOPHILS # BLD: 0.1 K/UL (ref 0–0.1)
BASOPHILS NFR BLD: 1 % (ref 0–1)
BILIRUB SERPL-MCNC: 0.4 MG/DL (ref 0.2–1)
BUN SERPL-MCNC: 12 MG/DL (ref 6–20)
BUN/CREAT SERPL: 16 (ref 12–20)
CALCIUM SERPL-MCNC: 10.1 MG/DL (ref 8.5–10.1)
CHLORIDE SERPL-SCNC: 106 MMOL/L (ref 97–108)
CHOLEST SERPL-MCNC: 145 MG/DL
CO2 SERPL-SCNC: 30 MMOL/L (ref 21–32)
CREAT SERPL-MCNC: 0.77 MG/DL (ref 0.7–1.3)
DIFFERENTIAL METHOD BLD: NORMAL
EOSINOPHIL # BLD: 0.2 K/UL (ref 0–0.4)
EOSINOPHIL NFR BLD: 3 % (ref 0–7)
ERYTHROCYTE [DISTWIDTH] IN BLOOD BY AUTOMATED COUNT: 12.5 % (ref 11.5–14.5)
GLOBULIN SER CALC-MCNC: 2.8 G/DL (ref 2–4)
GLUCOSE SERPL-MCNC: 93 MG/DL (ref 65–100)
HCT VFR BLD AUTO: 47.9 % (ref 36.6–50.3)
HDLC SERPL-MCNC: 52 MG/DL
HDLC SERPL: 2.8 {RATIO} (ref 0–5)
HGB BLD-MCNC: 16.3 G/DL (ref 12.1–17)
IMM GRANULOCYTES # BLD AUTO: 0 K/UL (ref 0–0.04)
IMM GRANULOCYTES NFR BLD AUTO: 0 % (ref 0–0.5)
LDLC SERPL CALC-MCNC: 69 MG/DL (ref 0–100)
LYMPHOCYTES # BLD: 0.8 K/UL (ref 0.8–3.5)
LYMPHOCYTES NFR BLD: 16 % (ref 12–49)
MCH RBC QN AUTO: 32.5 PG (ref 26–34)
MCHC RBC AUTO-ENTMCNC: 34 G/DL (ref 30–36.5)
MCV RBC AUTO: 95.6 FL (ref 80–99)
MONOCYTES # BLD: 0.6 K/UL (ref 0–1)
MONOCYTES NFR BLD: 11 % (ref 5–13)
NEUTS SEG # BLD: 3.5 K/UL (ref 1.8–8)
NEUTS SEG NFR BLD: 69 % (ref 32–75)
NRBC # BLD: 0 K/UL (ref 0–0.01)
NRBC BLD-RTO: 0 PER 100 WBC
PLATELET # BLD AUTO: 195 K/UL (ref 150–400)
PMV BLD AUTO: 10.3 FL (ref 8.9–12.9)
POTASSIUM SERPL-SCNC: 4.4 MMOL/L (ref 3.5–5.1)
PROT SERPL-MCNC: 6.8 G/DL (ref 6.4–8.2)
RBC # BLD AUTO: 5.01 M/UL (ref 4.1–5.7)
SODIUM SERPL-SCNC: 139 MMOL/L (ref 136–145)
TRIGL SERPL-MCNC: 120 MG/DL (ref ?–150)
TSH SERPL DL<=0.05 MIU/L-ACNC: 1.73 UIU/ML (ref 0.36–3.74)
VLDLC SERPL CALC-MCNC: 24 MG/DL
WBC # BLD AUTO: 5.1 K/UL (ref 4.1–11.1)

## 2021-10-19 PROCEDURE — G8427 DOCREV CUR MEDS BY ELIG CLIN: HCPCS | Performed by: INTERNAL MEDICINE

## 2021-10-19 PROCEDURE — G8420 CALC BMI NORM PARAMETERS: HCPCS | Performed by: INTERNAL MEDICINE

## 2021-10-19 PROCEDURE — 3017F COLORECTAL CA SCREEN DOC REV: CPT | Performed by: INTERNAL MEDICINE

## 2021-10-19 PROCEDURE — G0439 PPPS, SUBSEQ VISIT: HCPCS | Performed by: INTERNAL MEDICINE

## 2021-10-19 PROCEDURE — G8536 NO DOC ELDER MAL SCRN: HCPCS | Performed by: INTERNAL MEDICINE

## 2021-10-19 PROCEDURE — 1101F PT FALLS ASSESS-DOCD LE1/YR: CPT | Performed by: INTERNAL MEDICINE

## 2021-10-19 PROCEDURE — G8510 SCR DEP NEG, NO PLAN REQD: HCPCS | Performed by: INTERNAL MEDICINE

## 2021-10-19 NOTE — PROGRESS NOTES
HISTORY OF PRESENT ILLNESS  James Muro is a 66 y.o. male. HPI  Assessment: Campbell Odell is seen today for a Medicare Wellness Visit and follow up of chronic problems. Preventive Care. See attached Wellness Visit note. He is due for labs. He is up to date with urology follow up, vaccinations and colonoscopy. Chronic Problems Reviewed. Blood pressure is fine. He follows up with Breanna Yu of cardiology for PVCs. Cholesterol is due. He is considering a possible UroLift procedure. Review of Systems   Constitutional: Negative for chills, fever and weight loss. HENT: Positive for hearing loss. Respiratory: Negative. Cardiovascular: Positive for palpitations. Negative for chest pain, leg swelling and PND. Gastrointestinal: Negative. Genitourinary: Positive for dysuria and frequency. Recurrent UTI    Musculoskeletal: Negative for myalgias. Neurological: Negative for focal weakness. Physical Exam  Vitals and nursing note reviewed. Constitutional:       General: He is not in acute distress. Appearance: He is well-developed. HENT:      Head: Normocephalic and atraumatic. Right Ear: Tympanic membrane, ear canal and external ear normal.      Left Ear: Tympanic membrane, ear canal and external ear normal.   Eyes:      General:         Right eye: No discharge. Left eye: No discharge. Pupils: Pupils are equal, round, and reactive to light. Neck:      Thyroid: No thyromegaly. Vascular: No carotid bruit. Cardiovascular:      Rate and Rhythm: Normal rate and regular rhythm. Heart sounds: Normal heart sounds. No murmur heard. No friction rub. No gallop. Pulmonary:      Effort: Pulmonary effort is normal. No respiratory distress. Breath sounds: Normal breath sounds. No wheezing or rales. Abdominal:      General: Bowel sounds are normal. There is no distension. Palpations: Abdomen is soft. There is no mass. Tenderness:  There is no abdominal tenderness. There is no guarding or rebound. Musculoskeletal:         General: No tenderness. Normal range of motion. Cervical back: Normal range of motion and neck supple. Lymphadenopathy:      Cervical: No cervical adenopathy. Skin:     General: Skin is warm and dry. Findings: No rash. Neurological:      Mental Status: He is alert and oriented to person, place, and time. Deep Tendon Reflexes: Reflexes are normal and symmetric. Psychiatric:         Behavior: Behavior normal.         ASSESSMENT and PLAN  Diagnoses and all orders for this visit:    1. Medicare annual wellness visit, subsequent    2. Hypercholesteremia  -     METABOLIC PANEL, COMPREHENSIVE; Future  -     CBC WITH AUTOMATED DIFF; Future  -     TSH 3RD GENERATION; Future  -     LIPID PANEL; Future    3. Benign prostatic hyperplasia with urinary frequency    4. PVC (premature ventricular contraction)    5. Screening for prostate cancer    6. Screening for depression  -     DEPRESSION SCREEN ANNUAL    7.  Hyperlipidemia, unspecified hyperlipidemia type  -     LIPID PANEL

## 2021-10-19 NOTE — PATIENT INSTRUCTIONS
Medicare Wellness Visit, Male    The best way to live healthy is to have a lifestyle where you eat a well-balanced diet, exercise regularly, limit alcohol use, and quit all forms of tobacco/nicotine, if applicable. Regular preventive services are another way to keep healthy. Preventive services (vaccines, screening tests, monitoring & exams) can help personalize your care plan, which helps you manage your own care. Screening tests can find health problems at the earliest stages, when they are easiest to treat. [facility] follows the current, evidence-based guidelines published by the [facility] Charlie Ruddy ([facility]) when recommending preventive services for our patients. Because we follow these guidelines, sometimes recommendations change over time as research supports it. (For example, a prostate screening blood test is no longer routinely recommended for men with no symptoms). Of course, you and your doctor may decide to screen more often for some diseases, based on your risk and co-morbidities (chronic disease you are already diagnosed with). Preventive services for you include:  - Medicare offers their members a free annual wellness visit, which is time for you and your primary care provider to discuss and plan for your preventive service needs. Take advantage of this benefit every year!  -All adults over age 72 should receive the recommended pneumonia vaccines. Current USPSTF guidelines recommend a series of two vaccines for the best pneumonia protection.   -All adults should have a flu vaccine yearly and tetanus vaccine every 10 years.  -All adults age 48 and older should receive the shingles vaccines (series of two vaccines).        -All adults age 38-68 who are overweight should have a diabetes screening test once every three years.   -Other screening tests & preventive services for persons with diabetes include: an eye exam to screen for diabetic retinopathy, a kidney function test, a foot exam, and stricter control over your cholesterol.   -Cardiovascular screening for adults with routine risk involves an electrocardiogram (ECG) at intervals determined by the provider.   -Colorectal cancer screening should be done for adults age 54-65 with no increased risk factors for colorectal cancer. There are a number of acceptable methods of screening for this type of cancer. Each test has its own benefits and drawbacks. Discuss with your provider what is most appropriate for you during your annual wellness visit. The different tests include: colonoscopy (considered the best screening method), a fecal occult blood test, a fecal DNA test, and sigmoidoscopy.  -All adults born between [facility] should be screened once for Hepatitis C.  -An Abdominal Aortic Aneurysm (AAA) Screening is recommended for men age 73-68 who has ever smoked in their lifetime. Here is a list of your current Health Maintenance items (your personalized list of preventive services) with a due date: There are no preventive care reminders to display for this patient.

## 2021-10-19 NOTE — PROGRESS NOTES
This is the Subsequent Medicare Annual Wellness Exam, performed 12 months or more after the Initial AWV or the last Subsequent AWV    I have reviewed the patient's medical history in detail and updated the computerized patient record. Assessment/Plan   Education and counseling provided:  Are appropriate based on today's review and evaluation    1. Medicare annual wellness visit, subsequent  2. Hypercholesteremia  3. Benign prostatic hyperplasia with urinary frequency  4. PVC (premature ventricular contraction)  5. Screening for prostate cancer  6. Screening for depression  -     DEPRESSION SCREEN ANNUAL       Depression Risk Factor Screening     3 most recent PHQ Screens 10/19/2021   Little interest or pleasure in doing things Not at all   Feeling down, depressed, irritable, or hopeless Not at all   Total Score PHQ 2 0       Alcohol Risk Screen    Do you average more than 1 drink per night or more than 7 drinks a week: Yes- 2 per day    In the past three months have you have had more than 4 drinks containing alcohol on one occasion: No        Functional Ability and Level of Safety    Hearing: loss is mild      Activities of Daily Living: The home contains: no safety equipment. Patient does total self care      Ambulation: with no difficulty     Fall Risk:  Fall Risk Assessment, last 12 mths 10/19/2021   Able to walk? Yes   Fall in past 12 months? 0   Do you feel unsteady? 0   Are you worried about falling 0   Number of falls in past 12 months -   Fall with injury? -      Abuse Screen:  Patient is not abused       Cognitive Screening    Has your family/caregiver stated any concerns about your memory: no         Health Maintenance Due   There are no preventive care reminders to display for this patient.     Patient Care Team   Patient Care Team:  Ulysses Hanson MD as PCP - General (Internal Medicine)  Ulysses Hanson MD as PCP - [facility]  Heriberto Greenberg MD (Cardiology)    History Patient Active Problem List   Diagnosis Code    Mitral prolapse I34.1    Hypercholesteremia E78.00    Tinnitus of both ears H93.13    Urticaria L50.9    Calculus of kidney N20.0    Insomnia, unspecified G47.00    Elevated blood pressure reading without diagnosis of hypertension R03.0    Proteinuria R80.9    Attention deficit disorder without mention of hyperactivity F98.8    Encounter for long-term (current) use of other medications Z79.899    Unspecified vitamin D deficiency E55.9    Erectile dysfunction N52.9    Advanced directives, counseling/discussion Z71.89    PVC (premature ventricular contraction) I49.3     Past Medical History:   Diagnosis Date    Arrhythmia     PVCs    Calculus of kidney 2011    Depression     Hypercholesterolemia     Hyperlipidemia     Hypertension     marginally high readings per pt - high 130s + per pt    Kidney stone     Mitral valve prolapse     Sleep apnea     mild - uses mouth piece      Past Surgical History:   Procedure Laterality Date    COLONOSCOPY N/A 6/5/2019    [facility] ENDOSCOPY    ENDOSCOPY, COLON, DIAGNOSTIC  2005    polyp- dr Rivka Soto, 2012, 2017    HX GI      COLONOSCOPY    HX HEENT      uvulectomy    HX HEENT      wisdom teeth removed    HX ORTHOPAEDIC Right 12/2019    shoulder arthro    HX VASECTOMY  2002     Current Outpatient Medications   Medication Sig Dispense Refill    flecainide (TAMBOCOR) 50 mg tablet Take 0.5 Tablets by mouth daily. 45 Tablet 1    nitrofurantoin, macrocrystal-monohydrate, (MACROBID) 100 mg capsule       atorvastatin (LIPITOR) 20 mg tablet Take 1 Tab by mouth daily. 90 Tab 3    sildenafil citrate (VIAGRA) 100 mg tablet Take 1 Tab by mouth daily as needed for Erectile Dysfunction. 20 Tab 5    amLODIPine (NORVASC) 5 mg tablet TAKE 1 TABLET BY MOUTH EVERY DAY 90 Tab 1    finasteride (PROSCAR) 5 mg tablet Take 5 mg by mouth every evening.       tamsulosin (FLOMAX) 0.4 mg capsule Take 0.4 mg by mouth two (2) times a day.        No Known Allergies    Family History   Problem Relation Age of Onset    Heart Disease Mother     Cancer Mother         bladder    Heart Failure Mother         CHF    Stroke Father     No Known Problems Sister     No Known Problems Brother     No Known Problems Daughter     No Known Problems Daughter     No Known Problems Son     No Known Problems Son     No Known Problems Son     Anesth Problems Neg Hx      Social History     Tobacco Use    Smoking status: Former Smoker     Packs/day: 0.50     Years: 12.00     Pack years: 6.00     Types: Cigarettes     Quit date: 1975     Years since quittin.4    Smokeless tobacco: Never Used    Tobacco comment: quit 45 yrs ago - was an occasionally smoker in college/law school   Substance Use Topics    Alcohol use: Yes     Comment: glass of wine a few times a week if that          Alycia Cisneros MD

## 2021-10-26 RX ORDER — ATORVASTATIN CALCIUM 20 MG/1
20 TABLET, FILM COATED ORAL DAILY
Qty: 90 TABLET | Refills: 3 | Status: SHIPPED | OUTPATIENT
Start: 2021-10-26 | End: 2022-11-03 | Stop reason: SDUPTHER

## 2021-10-26 NOTE — TELEPHONE ENCOUNTER
Request for Lipitor 20 mg dialy. Last office visit 7/20/21, next office visit 7/21/22. Refills per verbal order from Dr. Laura Dodson.

## 2021-11-16 RX ORDER — AMLODIPINE BESYLATE 5 MG/1
TABLET ORAL
Qty: 90 TABLET | Refills: 1 | Status: SHIPPED | OUTPATIENT
Start: 2021-11-16 | End: 2022-03-29

## 2021-11-16 NOTE — TELEPHONE ENCOUNTER
Received refill request for amlodipine 5 mg po tabs. Refill authorized.     Future Appointments   Date Time Provider [facility]   7/21/2022  8:20 AM MD LUIS F Gilbert AMB

## 2021-12-23 RX ORDER — AMOXICILLIN AND CLAVULANATE POTASSIUM 875; 125 MG/1; MG/1
1 TABLET, FILM COATED ORAL EVERY 12 HOURS
Qty: 14 TABLET | Refills: 0 | Status: SHIPPED | OUTPATIENT
Start: 2021-12-23 | End: 2021-12-30

## 2022-03-14 RX ORDER — FLECAINIDE ACETATE 50 MG/1
25 TABLET ORAL DAILY
Qty: 45 TABLET | Refills: 1 | Status: SHIPPED | OUTPATIENT
Start: 2022-03-14 | End: 2022-09-29 | Stop reason: SDUPTHER

## 2022-03-14 NOTE — TELEPHONE ENCOUNTER
Request for Flecainide 25 mg daily. Last office visit 7/20/21, next office visit 7/21/22. Refills per verbal order from Dr. Rai Villasenor.

## 2022-03-27 NOTE — PROGRESS NOTES
Cardiology OFFICE Note     Subjective: Marylee Liter is a 66 y.o. male patient, retired , who presents for follow up of PVCs. Recently he was close to running out of his flecainide so he started taking it every other day   He started to have more PVCs but they have calmed down now that he is back on flecainide 25mg every day  He denies any dizziness or lightheadedness  Says about a week ago his BP was elevated at 140/85, he doesn't normally check it at home  Then today his BP was 150/88 when he first got here, BP came down to 136/82  He denies any increased stress, says he was out of town the last 4-5 days so he may have been eating more sodium than usual   Says about two months ago his urologist changed him from amlodipine to terazosin  He is very active, goes sailing, hikes  ECG today shows sinus bradycardia, VR 47 bpm   Denies chest pain or dyspnea or edema   Normal LVEF on last echo in . No ischemia on nuclear stress test in 2016. Previous:  Holter (2020): HR 41-92, average 57.  3 PVCs and 45 PACs. First degree AV block. Exercise stress nuclear test (2016): Good exercise capacity. No ischemia on ECG, PVC, but there are frequent PACs. BP was very high 250/100 with exercise. LVEF 69%, no ischemia or infarct. Echo (2016): LVEF 60-65%, no RWMA. Mild MR. Holter prior to flecainide showed 33K PVCs, mostly unifocal, appeared to originate from outflow tract, but additional PVCs with superior axis, originating from alternate location. 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 Mother  of CHF at age 81 yo and father  of CVA 81 yo.   5 children (all in good health), . Half brother may have a pacemaker.      Patient Active Problem List    Diagnosis Date Noted    Advanced directives, counseling/discussion 03/17/2016    PVC (premature ventricular contraction) 03/17/2016    Erectile dysfunction 02/24/2016    Encounter for long-term (current) use of other medications 02/23/2015    Unspecified vitamin D deficiency 02/23/2015    Attention deficit disorder without mention of hyperactivity 09/30/2014    Proteinuria 07/31/2014    Elevated blood pressure reading without diagnosis of hypertension 01/30/2014    Tinnitus of both ears 07/30/2013    Urticaria 07/30/2013    Calculus of kidney 07/30/2013    Insomnia, unspecified 07/30/2013    Mitral prolapse 02/07/2012    Hypercholesteremia 02/07/2012     Current Outpatient Medications   Medication Sig Dispense Refill    terazosin (HYTRIN) 5 mg capsule Take 5 mg by mouth nightly.  flecainide (TAMBOCOR) 50 mg tablet Take 0.5 Tablets by mouth daily. 45 Tablet 1    atorvastatin (LIPITOR) 20 mg tablet Take 1 Tablet by mouth daily. 90 Tablet 3    nitrofurantoin, macrocrystal-monohydrate, (MACROBID) 100 mg capsule       sildenafil citrate (VIAGRA) 100 mg tablet Take 1 Tab by mouth daily as needed for Erectile Dysfunction. 20 Tab 5    finasteride (PROSCAR) 5 mg tablet Take 5 mg by mouth every evening.  tamsulosin (FLOMAX) 0.4 mg capsule Take 0.4 mg by mouth two (2) times a day.  (Patient not taking: Reported on 3/29/2022)       No Known Allergies  Past Medical History:   Diagnosis Date    Arrhythmia     PVCs    Calculus of kidney 2011    Depression     Hypercholesterolemia     Hyperlipidemia     Hypertension     marginally high readings per pt - high 130s + per pt    Kidney stone     Mitral valve prolapse     Sleep apnea     mild - uses mouth piece     Past Surgical History:   Procedure Laterality Date    COLONOSCOPY N/A 6/5/2019    COLONOSCOPY performed by Mariia Arango MD at [facility]. [address], COLON, DIAGNOSTIC  2005    polyp- dr Julio Shah, 2012, 2017    HX GI      COLONOSCOPY    HX HEENT      uvulectomy    HX HEENT      wisdom teeth removed    HX ORTHOPAEDIC Right 2019    shoulder arthro    HX VASECTOMY  2002     Family History   Problem Relation Age of Onset    Heart Disease Mother     Cancer Mother         bladder    Heart Failure Mother         CHF    Stroke Father     No Known Problems Sister     No Known Problems Brother     No Known Problems Daughter     No Known Problems Daughter     No Known Problems Son     No Known Problems Son     No Known Problems Son     Anesth Problems Neg Hx      Social History     Tobacco Use    Smoking status: Former Smoker     Packs/day: 0.50     Years: 12.00     Pack years: 6.00     Types: Cigarettes     Quit date: 1975     Years since quittin.9    Smokeless tobacco: Never Used    Tobacco comment: quit 45 yrs ago - was an occasionally smoker in college/law school   Substance Use Topics    Alcohol use: Yes     Comment: glass of wine a few times a week if that         Review of Systems: All other review of systems otherwise negative. Constitutional: Negative for fever, chills, weight loss, malaise/fatigue. HEENT: Negative for nosebleeds, vision changes. Respiratory: Negative for cough, hemoptysis, sputum production, and wheezing. Cardiovascular:  No chest pain, no orthopnea, claudication, leg swelling, syncope, and PND. Gastrointestinal: Negative for nausea, vomiting, diarrhea, constipation, blood in stool and melena. Genitourinary: Negative for dysuria, and hematuria. Musculoskeletal: Negative for myalgias, arthralgia. Skin: Negative for rash. Heme: Does not bleed or bruise easily. Neurological: Negative for speech change and focal weakness     Objective:     Visit Vitals  /82 (BP 1 Location: Left upper arm, BP Patient Position: Sitting, BP Cuff Size: Adult)   Pulse (!) 53   Resp 16   Ht 5' 7\" (1.702 m)   Wt 160 lb (72.6 kg)   SpO2 97%   BMI 25.06 kg/m²     Physical Exam:   Constitutional: well-developed and well-nourished.  No respiratory distress. Head: Normocephalic and atraumatic. Eyes: Pupils are equal, round  ENT: hearing normal  Neck: supple. No JVD present. Cardiovascular: Bradycardic, regular rhythm. Exam reveals no gallop and no friction rub. No murmur heard. Pulmonary/Chest: Effort normal and breath sounds normal. No wheezes. Abdominal: Soft, no tenderness. Musculoskeletal: Normal ROM & gait   Vasc/lymphatic: No edema. Neurological: alert,oriented. Skin: Skin is warm and dry  Psychiatric: normal mood and affect. Behavior is normal. Judgment and thought content normal.      Assessment/Plan:       ICD-10-CM ICD-9-CM    1. PVC (premature ventricular contraction)  I49.3 427.69 AMB POC EKG ROUTINE W/ 12 LEADS, INTER & REP   2. Essential hypertension  I10 401.9    3. Sinus bradycardia  R00.1 427.89    4. Palpitations  R00.2 785. 1      ECG shows sinus bradycardia VR 47 bpm with first degree av block on flecainide 25mg daily, QTc 404 ms   PVCs are well controlled on low dose flecainide and we discussed leaving his dose at 25mg daily due to his baseline bradycardia  He is not anxious to have an ablation at this time  For his elevated blood pressure I asked him to check his BP twice daily and then send me the readings in 2 weeks for review  Last echo showed normal EF in 2016, no ischemia on stress test in 2016, labs ok in 10/21  He will follow up with Dr. Abbe Rosario in May 2022    Jose Miguel Sanabria, ANGEL, [address] [facility]  [facility]  [address], [address] [facility] [address]  [address]  () [phone] ([facility]) [phone]

## 2022-03-29 ENCOUNTER — OFFICE VISIT (OUTPATIENT)
Dept: CARDIOLOGY CLINIC | Age: 72
End: 2022-03-29
Payer: MEDICARE

## 2022-03-29 VITALS
HEART RATE: 53 BPM | WEIGHT: 160 LBS | DIASTOLIC BLOOD PRESSURE: 82 MMHG | BODY MASS INDEX: 25.11 KG/M2 | HEIGHT: 67 IN | RESPIRATION RATE: 16 BRPM | SYSTOLIC BLOOD PRESSURE: 136 MMHG | OXYGEN SATURATION: 97 %

## 2022-03-29 DIAGNOSIS — R00.2 PALPITATIONS: ICD-10-CM

## 2022-03-29 DIAGNOSIS — I49.3 PVC (PREMATURE VENTRICULAR CONTRACTION): Primary | ICD-10-CM

## 2022-03-29 DIAGNOSIS — R00.1 SINUS BRADYCARDIA: ICD-10-CM

## 2022-03-29 DIAGNOSIS — I10 ESSENTIAL HYPERTENSION: ICD-10-CM

## 2022-03-29 PROCEDURE — G8419 CALC BMI OUT NRM PARAM NOF/U: HCPCS | Performed by: NURSE PRACTITIONER

## 2022-03-29 PROCEDURE — G0463 HOSPITAL OUTPT CLINIC VISIT: HCPCS | Performed by: NURSE PRACTITIONER

## 2022-03-29 PROCEDURE — 93010 ELECTROCARDIOGRAM REPORT: CPT | Performed by: NURSE PRACTITIONER

## 2022-03-29 PROCEDURE — 99214 OFFICE O/P EST MOD 30 MIN: CPT | Performed by: NURSE PRACTITIONER

## 2022-03-29 PROCEDURE — 3017F COLORECTAL CA SCREEN DOC REV: CPT | Performed by: NURSE PRACTITIONER

## 2022-03-29 PROCEDURE — G8536 NO DOC ELDER MAL SCRN: HCPCS | Performed by: NURSE PRACTITIONER

## 2022-03-29 PROCEDURE — G8432 DEP SCR NOT DOC, RNG: HCPCS | Performed by: NURSE PRACTITIONER

## 2022-03-29 PROCEDURE — 93005 ELECTROCARDIOGRAM TRACING: CPT | Performed by: NURSE PRACTITIONER

## 2022-03-29 PROCEDURE — G8752 SYS BP LESS 140: HCPCS | Performed by: NURSE PRACTITIONER

## 2022-03-29 PROCEDURE — 1101F PT FALLS ASSESS-DOCD LE1/YR: CPT | Performed by: NURSE PRACTITIONER

## 2022-03-29 PROCEDURE — G8427 DOCREV CUR MEDS BY ELIG CLIN: HCPCS | Performed by: NURSE PRACTITIONER

## 2022-03-29 PROCEDURE — G8754 DIAS BP LESS 90: HCPCS | Performed by: NURSE PRACTITIONER

## 2022-03-29 RX ORDER — TERAZOSIN 5 MG/1
5 CAPSULE ORAL
COMMUNITY

## 2022-03-29 NOTE — PATIENT INSTRUCTIONS
Please continue flecainide 25mg daily     Please check your blood pressure twice daily and keep a written record of your blood pressure readings  Please send us readings in 2 weeks over [employer]

## 2022-04-05 ENCOUNTER — PATIENT MESSAGE (OUTPATIENT)
Dept: CARDIOLOGY CLINIC | Age: 72
End: 2022-04-05

## 2022-04-06 NOTE — TELEPHONE ENCOUNTER
Verified patients using two identifiers. Spoke with Mr. Viviana Alcocer regarding his BP readings. It does appear they have fluctuated some. Encouraged him to continue to check his BP readings over the next two weeks as advised by [facility]. Asked that he continue to make notes next to each reading including if he is feeling any PVCs at that time. He states his BP was well controlled when he checked it this morning and that overall he has been feeling okay. He does state that his PVCs are only intermittent. Requested that he follow back up with us when he has completed his 2 weeks of BP readings but encouraged him to call in the meantime if he does have any other concerns. Patient verbalized understanding and had no further questions at this time. ----- Message -----  From: Kirt Rogel  Sent: 4/5/2022   3:38 PM EDT  To: Berneta Soulier Nurse Pool  Subject: Blood Pressure Summary                           I know that you were looking for two weeks of readings, but I got a few readings that were surprisingly high and thought that I'd send you what I have recorded over the last 8 days or so in the interim. I have highlighted readings over 192 (systolic), etc.  You will see that for some of the readings I took a second reading to see if there was a material difference in switching arms. In some instances I just waited a few minutes to test the initial reading. I also have noticed that I'm very aware of occasional \"sensations\" (perhaps some odd PVCs - none that I would characterize as pain) in the vicinity of my heart. I don't know whether this may have any relation to what seems to me to be a higher than normal blood pressure. I welcome any thoughts that you might have. Many thanks.   Carloz Haley

## 2022-05-19 ENCOUNTER — OFFICE VISIT (OUTPATIENT)
Dept: CARDIOLOGY CLINIC | Age: 72
End: 2022-05-19
Payer: MEDICARE

## 2022-05-19 VITALS
DIASTOLIC BLOOD PRESSURE: 78 MMHG | OXYGEN SATURATION: 95 % | HEART RATE: 53 BPM | HEIGHT: 67 IN | WEIGHT: 155 LBS | BODY MASS INDEX: 24.33 KG/M2 | RESPIRATION RATE: 18 BRPM | SYSTOLIC BLOOD PRESSURE: 106 MMHG

## 2022-05-19 DIAGNOSIS — Z79.899 ENCOUNTER FOR MONITORING FLECAINIDE THERAPY: ICD-10-CM

## 2022-05-19 DIAGNOSIS — R00.1 SINUS BRADYCARDIA: Primary | ICD-10-CM

## 2022-05-19 DIAGNOSIS — Z51.81 ENCOUNTER FOR MONITORING FLECAINIDE THERAPY: ICD-10-CM

## 2022-05-19 DIAGNOSIS — I44.0 FIRST DEGREE AV BLOCK: ICD-10-CM

## 2022-05-19 DIAGNOSIS — R07.89 CHEST DISCOMFORT: ICD-10-CM

## 2022-05-19 DIAGNOSIS — I10 ESSENTIAL HYPERTENSION: ICD-10-CM

## 2022-05-19 DIAGNOSIS — I49.3 PVC (PREMATURE VENTRICULAR CONTRACTION): ICD-10-CM

## 2022-05-19 PROCEDURE — G8752 SYS BP LESS 140: HCPCS | Performed by: INTERNAL MEDICINE

## 2022-05-19 PROCEDURE — G8420 CALC BMI NORM PARAMETERS: HCPCS | Performed by: INTERNAL MEDICINE

## 2022-05-19 PROCEDURE — G8427 DOCREV CUR MEDS BY ELIG CLIN: HCPCS | Performed by: INTERNAL MEDICINE

## 2022-05-19 PROCEDURE — 99214 OFFICE O/P EST MOD 30 MIN: CPT | Performed by: INTERNAL MEDICINE

## 2022-05-19 PROCEDURE — G0463 HOSPITAL OUTPT CLINIC VISIT: HCPCS | Performed by: INTERNAL MEDICINE

## 2022-05-19 PROCEDURE — G8536 NO DOC ELDER MAL SCRN: HCPCS | Performed by: INTERNAL MEDICINE

## 2022-05-19 PROCEDURE — 1101F PT FALLS ASSESS-DOCD LE1/YR: CPT | Performed by: INTERNAL MEDICINE

## 2022-05-19 PROCEDURE — G8510 SCR DEP NEG, NO PLAN REQD: HCPCS | Performed by: INTERNAL MEDICINE

## 2022-05-19 PROCEDURE — 3017F COLORECTAL CA SCREEN DOC REV: CPT | Performed by: INTERNAL MEDICINE

## 2022-05-19 PROCEDURE — G8754 DIAS BP LESS 90: HCPCS | Performed by: INTERNAL MEDICINE

## 2022-05-19 NOTE — PATIENT INSTRUCTIONS
You have been scheduled for an echo stress test.    Please wear comfortable clothing (shorts or pants with a shirt or blouse) and walking/athletic shoes.     Do not eat or drink anything, except water, for at least 2 hours prior to your test.    Do take your scheduled medications prior to your test.

## 2022-05-19 NOTE — PROGRESS NOTES
Cardiac Electrophysiology OFFICE Note     Subjective:       Greg Dawkins is a 66 y.o. male patient, retired , who presents for follow up of PVCs. Has had PVCs for several years, had been well controlled with flecainide between 25 mg po bid and now qhs.  Symptoms had been quiescent, but has noted a few episodes of symptomatic PVCs more recently when he tried to space out dosing of his flecainide. He's also noted some intermittent brief chest discomfort, not necessarily associated with exertion, but has occurred on multiple occasions.  Still quite active, hikes frequently. ECG on 2022 showed SB 47 bpm with 1st degree AVB & QRSd 94 ms. Normal LVEF on last check in  via echo.  No ischemia on nuclear stress test in 2016. States BP had been elevated in early April for a few days, had SBP in the 170s.  It has since normalized. Previous:   Holter prior to flecainide showed 33K PVCs, mostly unifocal, appeared to originate from outflow tract, but additional PVCs with superior axis, originating from alternate location. 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 Races sailboats. Mother  of CHF at age 79 yo and father  of CVA 79 yo.   5 children (all in good health), . Half brother may have a pacemaker.        Patient Problem list  · Advanced directives, counseling/discussion 2016   · PVC (premature ventricular contraction) 2016   · Erectile dysfunction 2016   · Encounter for long-term (current) use of other medications 2015   · Unspecified vitamin D deficiency 2015   · Attention deficit disorder without mention of hyperactivity 2014   · Proteinuria 2014   · Elevated blood pressure reading without diagnosis of hypertension 2014   · Tinnitus of both ears 2013   · Urticaria 07/30/2013   · Calculus of kidney 07/30/2013   · Insomnia, unspecified 07/30/2013   · Mitral prolapse 02/07/2012   · Hypercholesteremia 02/07/2012     Current Outpatient Medications   Medication Sig Dispense Refill   · terazosin (HYTRIN) 5 mg capsule Take 5 mg by mouth nightly. · flecainide (TAMBOCOR) 50 mg tablet Take 0.5 Tablets by mouth daily. 45 Tablet 1   · atorvastatin (LIPITOR) 20 mg tablet Take 1 Tablet by mouth daily. 90 Tablet 3   · nitrofurantoin, macrocrystal-monohydrate, (MACROBID) 100 mg capsule   · sildenafil citrate (VIAGRA) 100 mg tablet Take 1 Tab by mouth daily as needed for Erectile Dysfunction. 20 Tab 5   · finasteride (PROSCAR) 5 mg tablet Take 5 mg by mouth every evening. · tamsulosin (FLOMAX) 0.4 mg capsule Take 0.4 mg by mouth two (2) times a day.  (Patient not taking: Reported on 3/29/2022)     No Known Allergies   Past Medical History:   Diagnosis Date   · Arrhythmia   PVCs   · Calculus of kidney 2011   · Depression   · Hypercholesterolemia   · Hyperlipidemia   · Hypertension   marginally high readings per pt - high 130s + per pt   · Kidney stone   · Mitral valve prolapse   · Sleep apnea   mild - uses mouth piece     Past Surgical History:   Procedure Laterality Date   · COLONOSCOPY N/A 6/5/2019   COL[facility]COPY   · ENDOSCOPY, COLON, DIAGNOSTIC 2005   polyp- dr Geoffrey Fisher, 2012, 2017   · HX GI   COLONOSCOPY   · HX HEENT   uvulectomy   · HX HEENT   wisdom teeth removed   · HX ORTHOPAEDIC Right 12/2019   shoulder arthro   · HX VASECTOMY 2002     Family History   Problem Relation Age of Onset   · Heart Disease Mother   · Cancer Mother      bladder   · Heart Failure Mother      CHF   · Stroke Father   · No Known Problems Sister   · No Known Problems Brother   · No Known Problems Daughter   · No Known Problems Daughter   · No Known Problems Son   · No Known Problems Son   · No Known Problems Son   · Anesth Problems Neg Hx     Social History Tobacco Use   · Smoking status: Former Smoker   Packs/day: 0.50   Years: 12.00   Pack years: 6.00   Types: Cigarettes   Quit date: 1975   Years since quittin.0   · Smokeless tobacco: Never Used   · Tobacco comment: quit 45 yrs ago - was an occasionally smoker in college/law school   Substance Use Topics   · Alcohol use: Yes   Comment: glass of wine a few times a week if that         Review of Systems: All other review of systems otherwise negative. Constitutional: Negative for fever, chills, weight loss, malaise/fatigue. HEENT: Negative for nosebleeds, vision changes. Respiratory: Negative for cough, hemoptysis, sputum production, and wheezing. Cardiovascular:  + chest pain, no orthopnea, claudication, leg swelling, syncope, and PND. + palpitations   Gastrointestinal: Negative for nausea, vomiting, diarrhea, constipation, blood in stool and melena. Genitourinary: Negative for dysuria, and hematuria. Musculoskeletal: Negative for myalgias, arthralgia. Skin: Negative for rash. Heme: Does not bleed or bruise easily. Neurological: Negative for speech change and focal weakness.       Objective:     Visit Vitals   /78 (BP 1 Location: Left upper arm, BP Patient Position: Sitting, BP Cuff Size: Adult)   Pulse (!) 53   Resp 18   Ht 5' 7\" (1.702 m)   Wt 155 lb (70.3 kg)   SpO2 95%   BMI 24.28 kg/m²     Physical Exam:   Constitutional: Well-developed and well-nourished. No respiratory distress. Head: Normocephalic and atraumatic. Eyes: Pupils are equal, round. ENT: Hearing grossly normal.   Neck: Supple. No JVD present. Cardiovascular: Bradycardic rate, occasionally irregular rhythm. Exam reveals no gallop and no friction rub. No murmur heard. Pulmonary/Chest: Effort normal and breath sounds normal. No wheezes. Abdominal: Soft, no tenderness. Musculoskeletal: Normal ROM & gait. Vasc/lymphatic: No edema. Neurological: Alert,oriented. Skin: Skin is warm and dry. Psychiatric: Normal mood and affect. Behavior is normal. Judgment and thought content normal.       Assessment/Plan:     Imaging/Studies:   Holter (7/29/2020): HR 41-92, average 57.  3 PVCs and 45 PACs.  First degree AV block. Exercise stress nuclear test (08/2016): Good exercise capacity. No ischemia on ECG, PVC, but there are frequent PACs.  BP was very high 250/100 with exercise.  LVEF 69%, no ischemia or infarct. Echo (03/21/2016): LVEF 60-65%, no RWMA. Allegra Garcia5 MR. ICD-10-CM ICD-9-CM    1. Sinus bradycardia  R00.1 427.89 ECHO STRESS   2. PVC (premature ventricular contraction)  I49.3 427.69 ECHO STRESS   3. Essential hypertension  I10 401.9    4. First degree AV block  I44.0 426.11    5. Encounter for monitoring flecainide therapy  Z51.81 V58.83     Z79.899 V58.69    6. Chest discomfort  R07.89 786.59 ECHO STRESS           PVCs: Asymptomatic, on very low dose flecainide once daily.  ECG on 03/30/2022 showed sinus bradycardia 47 bpm with 1st degree AVB & narrow QRS.  He had a flare a couple months ago when he tried every other day flecainide dosing, but this has since calmed down.  He has not been interested in ablation or pacemaker, but may consider depending on results of stress echo. Sinus bradycardia: Asymptomatic, down to 47 bpm on recent ECG but 53 today.  Also with 1st degree AVB. Chest discomfort: Not always associated with exertion & activity tolerance is still excellent.  Nuclear stress test in 2016 showed normal LVEF & no ischemia.  Will check stress echo to rule out ischemia & check for chronotropic variability & PVC behavior with exercise. HTN: BP had been high last month for a few days, but now better controlled.      Addendum  Rare PAC, no PVC  Normal stress echo     Future Appointments   Date Time Provider Humaira Ni   6/28/2022 11:00 AM AMANDEEP RICE   6/28/2022 11:00 AM AMANDEEP BARRERA       Thank you for involving me in this patient's care and please call with further concerns or questions. Jeff Álvarez M.D.    Electrophysiology/Cardiology   [facility]   [address] [id number] [facility] [place]Paul [place], [address] [place] [address]   [phone]                                        [phone]

## 2022-06-28 ENCOUNTER — ANCILLARY PROCEDURE (OUTPATIENT)
Dept: CARDIOLOGY CLINIC | Age: 72
End: 2022-06-28
Payer: MEDICARE

## 2022-06-28 VITALS
SYSTOLIC BLOOD PRESSURE: 120 MMHG | HEIGHT: 67 IN | BODY MASS INDEX: 24.33 KG/M2 | DIASTOLIC BLOOD PRESSURE: 70 MMHG | WEIGHT: 155 LBS

## 2022-06-28 DIAGNOSIS — R00.1 SINUS BRADYCARDIA: ICD-10-CM

## 2022-06-28 DIAGNOSIS — R07.89 CHEST DISCOMFORT: ICD-10-CM

## 2022-06-28 DIAGNOSIS — I49.3 PVC (PREMATURE VENTRICULAR CONTRACTION): ICD-10-CM

## 2022-06-28 PROCEDURE — 93351 STRESS TTE COMPLETE: CPT | Performed by: INTERNAL MEDICINE

## 2022-07-05 LAB
STRESS ANGINA INDEX: 0
STRESS BASELINE DIAS BP: 70 MMHG
STRESS BASELINE HR: 52 BPM
STRESS BASELINE ST DEPRESSION: 0 MM
STRESS BASELINE SYS BP: 120 MMHG
STRESS ESTIMATED WORKLOAD: 17.2 METS
STRESS EXERCISE DUR MIN: 13 MIN
STRESS EXERCISE DUR SEC: 0 SEC
STRESS O2 SAT PEAK: 98 %
STRESS O2 SAT REST: 98 %
STRESS PEAK DIAS BP: 80 MMHG
STRESS PEAK SYS BP: 190 MMHG
STRESS PERCENT HR ACHIEVED: 89 %
STRESS POST PEAK HR: 130 BPM
STRESS RATE PRESSURE PRODUCT: NORMAL BPM*MMHG
STRESS TARGET HR: 146 BPM

## 2022-07-05 PROCEDURE — 93351 STRESS TTE COMPLETE: CPT | Performed by: INTERNAL MEDICINE

## 2022-07-06 ENCOUNTER — TELEPHONE (OUTPATIENT)
Dept: CARDIOLOGY CLINIC | Age: 72
End: 2022-07-06

## 2022-07-06 NOTE — TELEPHONE ENCOUNTER
Attempted to reach patient by telephone. A message was left for return call.      [facility] message sent with results as well

## 2022-07-06 NOTE — TELEPHONE ENCOUNTER
----- Message from Cassandra Dennis MD sent at 7/5/2022 10:22 PM EDT -----  Rare PAC, no PVC  Normal stress echo

## 2022-07-11 ENCOUNTER — PATIENT MESSAGE (OUTPATIENT)
Dept: INTERNAL MEDICINE CLINIC | Age: 72
End: 2022-07-11

## 2022-08-22 RX ORDER — SILDENAFIL 100 MG/1
TABLET, FILM COATED ORAL
Qty: 20 TABLET | Refills: 0 | Status: SHIPPED | OUTPATIENT
Start: 2022-08-22

## 2022-09-09 ENCOUNTER — OFFICE VISIT (OUTPATIENT)
Dept: SLEEP MEDICINE | Age: 72
End: 2022-09-09
Payer: MEDICARE

## 2022-09-09 VITALS
OXYGEN SATURATION: 95 % | SYSTOLIC BLOOD PRESSURE: 123 MMHG | DIASTOLIC BLOOD PRESSURE: 73 MMHG | BODY MASS INDEX: 23.7 KG/M2 | HEIGHT: 67 IN | WEIGHT: 151 LBS | TEMPERATURE: 98.2 F | HEART RATE: 53 BPM

## 2022-09-09 DIAGNOSIS — G47.33 OSA (OBSTRUCTIVE SLEEP APNEA): Primary | ICD-10-CM

## 2022-09-09 PROCEDURE — G8420 CALC BMI NORM PARAMETERS: HCPCS | Performed by: INTERNAL MEDICINE

## 2022-09-09 PROCEDURE — G8427 DOCREV CUR MEDS BY ELIG CLIN: HCPCS | Performed by: INTERNAL MEDICINE

## 2022-09-09 PROCEDURE — G8752 SYS BP LESS 140: HCPCS | Performed by: INTERNAL MEDICINE

## 2022-09-09 PROCEDURE — 1101F PT FALLS ASSESS-DOCD LE1/YR: CPT | Performed by: INTERNAL MEDICINE

## 2022-09-09 PROCEDURE — G8432 DEP SCR NOT DOC, RNG: HCPCS | Performed by: INTERNAL MEDICINE

## 2022-09-09 PROCEDURE — G8536 NO DOC ELDER MAL SCRN: HCPCS | Performed by: INTERNAL MEDICINE

## 2022-09-09 PROCEDURE — 1123F ACP DISCUSS/DSCN MKR DOCD: CPT | Performed by: INTERNAL MEDICINE

## 2022-09-09 PROCEDURE — 3017F COLORECTAL CA SCREEN DOC REV: CPT | Performed by: INTERNAL MEDICINE

## 2022-09-09 PROCEDURE — 99204 OFFICE O/P NEW MOD 45 MIN: CPT | Performed by: INTERNAL MEDICINE

## 2022-09-09 PROCEDURE — G8754 DIAS BP LESS 90: HCPCS | Performed by: INTERNAL MEDICINE

## 2022-09-09 NOTE — PATIENT INSTRUCTIONS
[address] [facility] [place]. [place], [address]  Tel.  [phone]  Fax. [address] [facility]  [place], [address] [employer] [address] [employer]  Tel.  [phone]  Fax. [phone] [address]  Tel.  [phone]  Fax. [phone]     Sleep Apnea: After Your Visit  Your Care Instructions  Sleep apnea occurs when you frequently stop breathing for 10 seconds or longer during sleep. It can be mild to severe, based on the number of times per hour that you stop breathing or have slowed breathing. Blocked or narrowed airways in your nose, mouth, or throat can cause sleep apnea. Your airway can become blocked when your throat muscles and tongue relax during sleep. Sleep apnea is common, occurring in 1 out of 20 individuals. Individuals having any of the following characteristics should be evaluated and treated right away due to high risk and detrimental consequences from untreated sleep apnea:  Obesity  Congestive Heart failure  Atrial Fibrillation  Uncontrolled Hypertension  Type II Diabetes  Night-time Arrhythmias  Stroke  Pulmonary Hypertension  High-risk Driving Populations (pilots, truck drivers, etc.)  Patients Considering Weight-loss Surgery    How do you know you have sleep apnea? You probably have sleep apnea if you answer 'yes' to 3 or more of the following questions:  S - Have you been told that you Snore? T - Are you often Tired during the day? O - Has anyone Observed you stop breathing while sleeping? P- Do you have (or are being treated for) high blood Pressure? B - Are you obese (Body Mass Index > 35)? A - Is your Age 48years old or older? N - Is your Neck size greater than 16 inches? G - Are you male Gender? A sleep physician can prescribe a breathing device that prevents tissues in the throat from blocking your airway. Or your doctor may recommend using a dental device (oral breathing device) to help keep your airway open.  In some cases, surgery may be needed to remove enlarged tissues in the throat. Follow-up care is a key part of your treatment and safety. Be sure to make and go to all appointments, and call your doctor if you are having problems. It's also a good idea to know your test results and keep a list of the medicines you take. How can you care for yourself at home? Lose weight, if needed. It may reduce the number of times you stop breathing or have slowed breathing. Go to bed at the same time every night. Sleep on your side. It may stop mild apnea. If you tend to roll onto your back, sew a pocket in the back of your [employer]oDynamics top. Put a tennis ball into the pocket, and stitch the pocket shut. This will help keep you from sleeping on your back. Avoid alcohol and medicines such as sleeping pills and sedatives before bed. Do not smoke. Smoking can make sleep apnea worse. If you need help quitting, talk to your doctor about stop-smoking programs and medicines. These can increase your chances of quitting for good. Prop up the head of your bed 4 to 6 inches by putting bricks under the legs of the bed. Treat breathing problems, such as a stuffy nose, caused by a cold or allergies. Use a continuous positive airway pressure (CPAP) breathing machine if lifestyle changes do not help your apnea and your doctor recommends it. The machine keeps your airway from closing when you sleep. If CPAP does not help you, ask your doctor whether you should try other breathing machines. A bilevel positive airway pressure machine has two types of air pressureâone for breathing in and one for breathing out. Another device raises or lowers air pressure as needed while you breathe. If your nose feels dry or bleeds when using one of these machines, talk with your doctor about increasing moisture in the air. A humidifier may help.   If your nose is runny or stuffy from using a breathing machine, talk with your doctor about using decongestants or a corticosteroid nasal spray.  When should you call for help? Watch closely for changes in your health, and be sure to contact your doctor if:  You still have sleep apnea even though you have made lifestyle changes. You are thinking of trying a device such as CPAP. You are having problems using a CPAP or similar machine. Where can you learn more? Go to [employer]. Enter B949 in the search box to learn more about \"Sleep Apnea: After Your Visit. \"   © 4210-1867 Healthwise, Incorporated. Care instructions adapted under license by [employer] (which disclaims liability or warranty for this information). This care instruction is for use with your licensed healthcare professional. If you have questions about a medical condition or this instruction, always ask your healthcare professional. Bart Ramirez any warranty or liability for your use of this information. PROPER SLEEP HYGIENE    What to avoid  Do not have drinks with caffeine, such as coffee or black tea, for 8 hours before bed. Do not smoke or use other types of tobacco near bedtime. Nicotine is a stimulant and can keep you awake. Avoid drinking alcohol late in the evening, because it can cause you to wake in the middle of the night. Do not eat a big meal close to bedtime. If you are hungry, eat a light snack. Do not drink a lot of water close to bedtime, because the need to urinate may wake you up during the night. Do not read or watch TV in bed. Use the bed only for sleeping and sexual activity. What to try  Go to bed at the same time every night, and wake up at the same time every morning. Do not take naps during the day. Keep your bedroom quiet, dark, and cool. Get regular exercise, but not within 3 to 4 hours of your bedtime. .  Sleep on a comfortable pillow and mattress. If watching the clock makes you anxious, turn it facing away from you so you cannot see the time.   If you worry when you lie down, start a worry book. Well before bedtime, write down your worries, and then set the book and your concerns aside. Try meditation or other relaxation techniques before you go to bed. If you cannot fall asleep, get up and go to another room until you feel sleepy. Do something relaxing. Repeat your bedtime routine before you go to bed again. Make your house quiet and calm about an hour before bedtime. Turn down the lights, turn off the TV, log off the computer, and turn down the volume on music. This can help you relax after a busy day. Drowsy Driving  The [address] [place] [address] Road Traffic Safety Administration cites drowsiness as a causing factor in more than 158,400 police reported crashes annually, resulting in 76,000 injuries and 1,500 deaths. Other surveys suggest 55% of people polled have driven while drowsy in the past year, 23% had fallen asleep but not crashed, 3% crashed, and 2% had and accident due to drowsy driving. Who is at risk? Young Drivers: One study of drowsy driving accidents states that 55% of the drivers were under 25 years. Of those, 75% were male. Shift Workers and Travelers: People who work overnight or travel across time zones frequently are at higher risk of experiencing Circadian Rhythm Disorders. They are trying to work and function when their body is programed to sleep. Sleep Deprived: Lack of sleep has a serious impact on your ability to pay attention or focus on a task. Consistently getting less than the average of 8 hours your body needs creates partial or cumulative sleep deprivation. Untreated Sleep Disorders: Sleep Apnea, Narcolepsy, R.L.S., and other sleep disorders (untreated) prevent a person from getting enough restful sleep. This leads to excessive daytime sleepiness and increases the risk for drowsy driving accidents by up to 7 times. Medications / Alcohol: Even over the counter medications can cause drowsiness.  Medications that impair a drivers attention should have a warning label. Alcohol naturally makes you sleepy and on its own can cause accidents. Combined with excessive drowsiness its effects are amplified. Signs of Drowsy Driving:   * You don't remember driving the last few miles   * You may drift out of your tessa   * You are unable to focus and your thoughts wander   * You may yawn more often than normal   * You have difficulty keeping your eyes open / nodding off   * Missing traffic signs, speeding, or tailgating  Prevention-   Good sleep hygiene, lifestyle and behavioral choices have the most impact on drowsy driving. There is no substitute for sleep and the average person requires 8 hours nightly. If you find yourself driving drowsy, stop and sleep. Consider the sleep hygiene tips provided during your visit as well. Medication Refill Policy: Refills for all medications require 1 week advance notice. Please have your pharmacy fax a refill request. We are unable to fax, or call in \"controled substance\" medications and you will need to pick these prescriptions up from our office. [employer] Activation    Thank you for requesting access to [employer]. Please follow the instructions below to securely access and download your online medical record. [employer] allows you to send messages to your doctor, view your test results, renew your prescriptions, schedule appointments, and more. How Do I Sign Up? In your internet browser, go to [email or web address]. Click on the First Time User? Click Here link in the Sign In box. You will see the New Member Sign Up page. Enter your [employer] Access Code exactly as it appears below. You will not need to use this code after youve completed the sign-up process. If you do not sign up before the expiration date, you must request a new code. [employer] Access Code:  Activation code not generated  Current [employer] Status: Active (This is the date your [employer] access code will )    Enter the last four digits of your Social Security Number (xxxx) and Date of Birth (mm/dd/yyyy) as indicated and click Submit. You will be taken to the next sign-up page. Create a [employer] ID. This will be your [employer] login ID and cannot be changed, so think of one that is secure and easy to remember. Create a [employer] password. You can change your password at any time. Enter your Password Reset Question and Answer. This can be used at a later time if you forget your password. Enter your e-mail address. You will receive e-mail notification when new information is available in [address] Click Sign Up. You can now view and download portions of your medical record. Click the [employer] link to download a portable copy of your medical information. Additional Information    If you have questions, please call [phone]. Remember, [employer] is NOT to be used for urgent needs. For medical emergencies, dial 911.

## 2022-09-09 NOTE — PROGRESS NOTES
[address] [facility] [place]. [place], [address]  Tel.  [phone]  Fax. [address] [facility] [address]  [address] [facility] [address], [address]  Tel.  [phone]  Fax. [phone] [address]  Tel.  [phone]  Fax. [phone]       Sherald Spurling is a 74y.o. year old male referred by Dr. James Frausto  for evaluation of a sleep disorder. ASSESSMENT/PLAN:      ICD-10-CM ICD-9-CM    1. BRANDON (obstructive sleep apnea)  G47.33 327.23 POLYSOMNOGRAPHY 1 NIGHT      2. BMI 23.0-23.9, adult  Z68.23 V85.1           Patient has a history and examination consistent with the diagnosis of sleep apnea. Follow-up and Dispositions    Return for telephone follow-up after testing is completed. * Sleep testing was ordered for evaluation of efficacy of current OAT. Orders Placed This Encounter    POLYSOMNOGRAPHY 1 NIGHT     Standing Status:   Future     Standing Expiration Date:   3/9/2023     Order Specific Question:   Reason for Exam     Answer:   BRANDON       * He was provided information on sleep apnea including corresponding risk factors and the importance of proper treatment. * Treatment options were reviewed in detail. he would like to proceed with OAT (new device - Dr. Berna Birmingham) / combination PAP therapy. * The patient was counseled regarding proper sleep hygiene, with emphasis on ensuring sufficient total sleep time; safe driving and the benefits of exercise. * All of his questions were addressed. SUBJECTIVE/OBJECTIVE:    Sherald Spurling is an 74 y.o. male referred for evaluation for a sleep disorder. He complains of snoring associated with awakening in the middle of the night because of urination and because of snoring. He reports of tiredness on waking. Symptoms began several years ago, improved since he has been using an Oral Appliance. He usually can fall asleep in 5 minutes.   Family or house members note mild snoring while using Oral Appliance. He had shoulder surgery about 3 years previously and prefers to sleep supine. He was referred by Dr. Alecia Castelan to assess efficacy of Oral Appliance Therapy. He denies of symptoms indicative of cataplexy, sleep paralysis or sleep related hallucinations. He denies of a history of unusual movements occurring during sleep. Latoya Stuart does not wake up frequently at night. He is bothered by waking up too early and left unable to get back to sleep. He actually sleeps about 6 hours at night and wakes up about 3 times during the night. He does not work shifts:  . Niru Pires indicates he does get too little sleep at night. His bedtime is 0000. He awakens at 0600. He does not take naps. He has the following observed behaviors: Loud snoring, Light snoring;  . Other remarks: The patient has undergone diagnostic testing for the current problems. Baseline - Home Sleep Apnea Test (HSAT) performed on 03- showed an AHI of 13.8/hr with a lowest SpO2 of 80%, duration of SpO2 < 88% 10.4 minutes. OAT - Home Sleep Apnea Test (HSAT) performed on 07- showed an AHI of 1.1/hr with a lowest SpO2 of 87%, duration of SpO2 < 88% 1.1 minutes. Review of Systems:  Constitutional:  No significant weight loss or weight gain  Eyes:  No blurred vision  CVS:  No significant chest pain  Pulm:  No significant shortness of breath  GI:  No significant nausea or vomiting  :  No significant nocturia  Musculoskeletal:  No significant joint pain at night  Skin:  No significant rashes  Neuro:  No significant dizziness   Psych:  No active mood issues    Sleep Review of Systems: notable for Negative difficulty falling asleep; Negative awakenings at night; Positive perceived regular dreaming; Negative nightmares; Negative  early morning headaches; Negative  memory problems; Negative  concentration issues;  Negative caffeine;  Negative alcohol;   Negative history of any automobile or occupational accidents due to daytime drowsiness. [place] Sleepiness Score: 3   and Modified F.O.S.Q. Score Total / 2: 19    Visit Vitals  /73 (BP 1 Location: Left upper arm, BP Patient Position: Sitting, BP Cuff Size: Adult)   Pulse (!) 53   Temp 98.2 °F (36.8 °C) (Temporal)   Ht 5' 7\" (1.702 m)   Wt 151 lb (68.5 kg) Comment: patient reported   SpO2 95%   BMI 23.65 kg/m²    Neck circ. in \"inches\": 14.75      General:   Alert, oriented, not in acute distress   Eyes:  Anicteric Sclerae; intact EOM's   Nose:  No obvious nasal septum deviation    Oropharynx:   Mallampati score 3   Neck:   midline trachea,  no JVD   Chest/Lungs:  symmetrical lung expansion ,clear lung fields on auscultation    CVS:  Normal rate, regular rhythm    Extremities:  No obvious rashes, absent edema    Neuro:  No focal deficits; No obvious tremor    Psych:  Normal eye contact; normal  affect, normal countenance          Satinder Rios MD, FAASM  Diplomate American Board of Sleep Medicine  Diplomate in Sleep Medicine - ABP    Electronically signed.  09/09/22

## 2022-09-13 ENCOUNTER — TELEPHONE (OUTPATIENT)
Dept: SLEEP MEDICINE | Age: 72
End: 2022-09-13

## 2022-09-14 ENCOUNTER — HOSPITAL ENCOUNTER (OUTPATIENT)
Dept: SLEEP MEDICINE | Age: 72
Discharge: HOME | End: 2022-09-14
Payer: MEDICARE

## 2022-09-14 DIAGNOSIS — G47.33 OSA (OBSTRUCTIVE SLEEP APNEA): ICD-10-CM

## 2022-09-14 PROCEDURE — 95810 POLYSOM 6/> YRS 4/> PARAM: CPT | Performed by: INTERNAL MEDICINE

## 2022-09-15 VITALS
HEIGHT: 67 IN | WEIGHT: 151 LBS | SYSTOLIC BLOOD PRESSURE: 119 MMHG | HEART RATE: 58 BPM | OXYGEN SATURATION: 95 % | DIASTOLIC BLOOD PRESSURE: 66 MMHG | BODY MASS INDEX: 23.7 KG/M2 | TEMPERATURE: 98.8 F

## 2022-09-15 NOTE — PROGRESS NOTES
[address] [place]. [place], [address]  Tel.  [phone]  Fax. 100 [facility] 60  [place], [address] [facility]  Tel.  [phone]  Fax. [phone] [address] [facility] [address]   Tel.  [phone]  Fax. [phone]     Sleep Study Technical Notes        PRE-Test:  Alisia Garcia (: 1950) arrived in the lobby. Patient was greeted, temperature checked (98.8) and screening questions asked. The patient was taken directly to his room. Weight per patient (151bs). BP (119/66) and SpO2 (95%) noted. Procedure explained and questions were answered. The patient expressed understanding. Electrodes were applied without incident. Acquisition Notes: The patient experienced occasional obstructive apneas and hypopneas. Lights off: 2317  Respiratory events: hypopneas/OA  ECG: normal  Other comments:   Pt wore his oral appliance, per orders        POST Test:  Patient was awakened. Electrodes were removed. The patient was discharged after answering the Post Questionnaire. Patient stated that he was alert and ok. Toby Castorena

## 2022-09-16 ENCOUNTER — TELEPHONE (OUTPATIENT)
Dept: SLEEP MEDICINE | Age: 72
End: 2022-09-16

## 2022-09-16 DIAGNOSIS — G47.33 OSA (OBSTRUCTIVE SLEEP APNEA): Primary | ICD-10-CM

## 2022-09-28 ENCOUNTER — PATIENT MESSAGE (OUTPATIENT)
Dept: SLEEP MEDICINE | Age: 72
End: 2022-09-28

## 2022-09-28 NOTE — TELEPHONE ENCOUNTER
Hollie Dorado is to be contacted by lead sleep technologist regarding results of Sleep Testing which was indicative of an average AHI of  1.6 per hour with an SpO2 ishmael of 87% and SpO2 of < 88% being 0.10 minutes. Patient should continue to use his Oral Appliance and follow-up with Dr. Marveen Mohs. If patient has any further questions he should schedule a visit to discuss. Repeat testing is to be considered if sleep symptoms persist or worsen over time.

## 2022-09-29 RX ORDER — FLECAINIDE ACETATE 50 MG/1
25 TABLET ORAL DAILY
Qty: 45 TABLET | Refills: 1 | Status: SHIPPED | OUTPATIENT
Start: 2022-09-29

## 2022-09-29 NOTE — TELEPHONE ENCOUNTER
Requested Prescriptions     Signed Prescriptions Disp Refills    flecainide (TAMBOCOR) 50 mg tablet 45 Tablet 1     Sig: Take 0.5 Tablets by mouth daily. Authorizing Provider: LAURA COOLEY     Ordering User: Ja CHAO     Refills per verbal order from Dr. Mary Ann Paz.   Last OV: 5/19/22

## 2022-09-29 NOTE — TELEPHONE ENCOUNTER
Patient called to request a refill, patient states he is out of this med.       [employer] [phone]    Patient was last seen 05/19/2022          TEMPP:[phone]

## 2022-09-29 NOTE — TELEPHONE ENCOUNTER
Request for Flecainide 50 mg daily. Last office visit 5/19/22, next office visit not yet scheduled. Refills per verbal order from Dr. Tavia Huber.

## 2022-10-28 ENCOUNTER — HOSPITAL ENCOUNTER (OUTPATIENT)
Age: 72
Setting detail: OUTPATIENT SURGERY
Discharge: HOME | End: 2022-10-28
Attending: INTERNAL MEDICINE | Admitting: INTERNAL MEDICINE
Payer: MEDICARE

## 2022-10-28 ENCOUNTER — ANESTHESIA (OUTPATIENT)
Dept: ENDOSCOPY | Age: 72
End: 2022-10-28
Payer: MEDICARE

## 2022-10-28 ENCOUNTER — ANESTHESIA EVENT (OUTPATIENT)
Dept: ENDOSCOPY | Age: 72
End: 2022-10-28
Payer: MEDICARE

## 2022-10-28 VITALS
SYSTOLIC BLOOD PRESSURE: 114 MMHG | TEMPERATURE: 97.7 F | WEIGHT: 152 LBS | RESPIRATION RATE: 18 BRPM | OXYGEN SATURATION: 95 % | BODY MASS INDEX: 23.86 KG/M2 | DIASTOLIC BLOOD PRESSURE: 66 MMHG | HEART RATE: 54 BPM | HEIGHT: 67 IN

## 2022-10-28 PROCEDURE — 74011000250 HC RX REV CODE- 250: Performed by: NURSE ANESTHETIST, CERTIFIED REGISTERED

## 2022-10-28 PROCEDURE — 77030013992 HC SNR POLYP ENDOSC BSC -B: Performed by: INTERNAL MEDICINE

## 2022-10-28 PROCEDURE — 76040000019: Performed by: INTERNAL MEDICINE

## 2022-10-28 PROCEDURE — 76060000031 HC ANESTHESIA FIRST 0.5 HR: Performed by: INTERNAL MEDICINE

## 2022-10-28 PROCEDURE — 74011250636 HC RX REV CODE- 250/636: Performed by: NURSE ANESTHETIST, CERTIFIED REGISTERED

## 2022-10-28 PROCEDURE — 88305 TISSUE EXAM BY PATHOLOGIST: CPT

## 2022-10-28 PROCEDURE — 2709999900 HC NON-CHARGEABLE SUPPLY: Performed by: INTERNAL MEDICINE

## 2022-10-28 PROCEDURE — 74011250636 HC RX REV CODE- 250/636: Performed by: INTERNAL MEDICINE

## 2022-10-28 RX ORDER — LIDOCAINE HYDROCHLORIDE 20 MG/ML
INJECTION, SOLUTION EPIDURAL; INFILTRATION; INTRACAUDAL; PERINEURAL AS NEEDED
Status: DISCONTINUED | OUTPATIENT
Start: 2022-10-28 | End: 2022-10-28 | Stop reason: HOSPADM

## 2022-10-28 RX ORDER — ATROPINE SULFATE 0.1 MG/ML
0.5 INJECTION INTRAVENOUS
Status: DISCONTINUED | OUTPATIENT
Start: 2022-10-28 | End: 2022-10-28 | Stop reason: HOSPADM

## 2022-10-28 RX ORDER — SODIUM CHLORIDE 9 MG/ML
50 INJECTION, SOLUTION INTRAVENOUS CONTINUOUS
Status: DISCONTINUED | OUTPATIENT
Start: 2022-10-28 | End: 2022-10-28 | Stop reason: HOSPADM

## 2022-10-28 RX ORDER — PROPOFOL 10 MG/ML
INJECTION, EMULSION INTRAVENOUS AS NEEDED
Status: DISCONTINUED | OUTPATIENT
Start: 2022-10-28 | End: 2022-10-28 | Stop reason: HOSPADM

## 2022-10-28 RX ORDER — EPINEPHRINE 0.1 MG/ML
1 INJECTION INTRACARDIAC; INTRAVENOUS
Status: DISCONTINUED | OUTPATIENT
Start: 2022-10-28 | End: 2022-10-28 | Stop reason: HOSPADM

## 2022-10-28 RX ORDER — SODIUM CHLORIDE 0.9 % (FLUSH) 0.9 %
5-40 SYRINGE (ML) INJECTION AS NEEDED
Status: DISCONTINUED | OUTPATIENT
Start: 2022-10-28 | End: 2022-10-28 | Stop reason: HOSPADM

## 2022-10-28 RX ORDER — DEXTROMETHORPHAN/PSEUDOEPHED 2.5-7.5/.8
1.2 DROPS ORAL
Status: DISCONTINUED | OUTPATIENT
Start: 2022-10-28 | End: 2022-10-28 | Stop reason: HOSPADM

## 2022-10-28 RX ORDER — NALOXONE HYDROCHLORIDE 0.4 MG/ML
0.4 INJECTION, SOLUTION INTRAMUSCULAR; INTRAVENOUS; SUBCUTANEOUS
Status: DISCONTINUED | OUTPATIENT
Start: 2022-10-28 | End: 2022-10-28 | Stop reason: HOSPADM

## 2022-10-28 RX ORDER — FLUMAZENIL 0.1 MG/ML
0.2 INJECTION INTRAVENOUS
Status: DISCONTINUED | OUTPATIENT
Start: 2022-10-28 | End: 2022-10-28 | Stop reason: HOSPADM

## 2022-10-28 RX ORDER — SODIUM CHLORIDE 0.9 % (FLUSH) 0.9 %
5-40 SYRINGE (ML) INJECTION EVERY 8 HOURS
Status: DISCONTINUED | OUTPATIENT
Start: 2022-10-28 | End: 2022-10-28 | Stop reason: HOSPADM

## 2022-10-28 RX ADMIN — PROPOFOL 50 MG: 10 INJECTION, EMULSION INTRAVENOUS at 08:13

## 2022-10-28 RX ADMIN — PROPOFOL 50 MG: 10 INJECTION, EMULSION INTRAVENOUS at 08:19

## 2022-10-28 RX ADMIN — PROPOFOL 50 MG: 10 INJECTION, EMULSION INTRAVENOUS at 08:10

## 2022-10-28 RX ADMIN — SODIUM CHLORIDE: 900 INJECTION, SOLUTION INTRAVENOUS at 08:05

## 2022-10-28 RX ADMIN — PROPOFOL 50 MG: 10 INJECTION, EMULSION INTRAVENOUS at 08:11

## 2022-10-28 RX ADMIN — LIDOCAINE HYDROCHLORIDE 60 MG: 20 INJECTION, SOLUTION EPIDURAL; INFILTRATION; INTRACAUDAL; PERINEURAL at 08:10

## 2022-10-28 RX ADMIN — PROPOFOL 50 MG: 10 INJECTION, EMULSION INTRAVENOUS at 08:16

## 2022-10-28 NOTE — PROGRESS NOTES

## 2022-10-28 NOTE — ANESTHESIA POSTPROCEDURE EVALUATION
Post-Anesthesia Evaluation and Assessment    Patient: Maria Isabel Sher MRN: [id number]  SSN: [id number]    YOB: 1950  Age: 74 y.o. Sex: male      I have evaluated the patient and they are stable and ready for discharge from the PACU. Cardiovascular Function/Vital Signs  Visit Vitals  /66   Pulse (!) 54   Temp 36.5 °C (97.7 °F)   Resp 18   Ht 5' 7\" (1.702 m)   Wt 68.9 kg (152 lb)   SpO2 95%   BMI 23.81 kg/m²       Patient is status post MAC anesthesia for Procedure(s):  COLONOSCOPY  ENDOSCOPIC POLYPECTOMY. Nausea/Vomiting: None    Postoperative hydration reviewed and adequate. Pain:  Pain Scale 1: Numeric (0 - 10) (10/28/22 0845)  Pain Intensity 1: 0 (10/28/22 0845)   Managed    Neurological Status: At baseline    Mental Status, Level of Consciousness: Alert and  oriented to person, place, and time    Pulmonary Status:   O2 Device: None (Room air) (10/28/22 0845)   Adequate oxygenation and airway patent    Complications related to anesthesia: None    Post-anesthesia assessment completed. No concerns      Signed By: Milagro Del Real DO     October 28, 2022              Procedure(s):  COLONOSCOPY  ENDOSCOPIC POLYPECTOMY.     MAC    <BSHSIANPOST>    INITIAL Post-op Vital signs:   Vitals Value Taken Time   /66 10/28/22 0845   Temp 36.5 °C (97.7 °F) 10/28/22 0830   Pulse 54 10/28/22 0845   Resp 18 10/28/22 0845   SpO2 95 % 10/28/22 0845

## 2022-10-28 NOTE — H&P
[address]  [address] [place]  [place], [address]   [phone]                                History and Physical     NAME: Manasa Talley   :  1950   MRN:  [id number]     HPI:  The patient was seen and examined. Past Surgical History:   Procedure Laterality Date    COLONOSCOPY N/A 2019    COLONOSCOPY performed by Denise Laws MD at [facility], COLON, DIAGNOSTIC  2005    polyp- dr Kervin Yang, ,     HX GI      COLONOSCOPY    HX HEENT      uvulectomy    HX HEENT      wisdom teeth removed    HX ORTHOPAEDIC Right 2019    shoulder arthro    HX VASECTOMY       Past Medical History:   Diagnosis Date    Arrhythmia     PVCs    BPH (benign prostatic hyperplasia)     Calculus of kidney     Depression     Hypercholesterolemia     Hyperlipidemia     Hypertension     marginally high readings per pt - high 130s + per pt    Kidney stone     Mitral valve prolapse     Sleep apnea     mild - uses mouth piece     Social History     Tobacco Use    Smoking status: Former     Packs/day: 0.50     Years: 12.00     Pack years: 6.00     Types: Cigarettes     Quit date: 1975     Years since quittin.5    Smokeless tobacco: Never    Tobacco comments:     quit 45 yrs ago - was an occasionally smoker in college/law school   Substance Use Topics    Alcohol use: Yes     Comment: glass of wine a few times a week if that     Drug use: No     No Known Allergies  Family History   Problem Relation Age of Onset    Heart Disease Mother     Cancer Mother         bladder    Heart Failure Mother         CHF    Stroke Father     No Known Problems Sister     No Known Problems Brother     No Known Problems Daughter     No Known Problems Daughter     No Known Problems Son     No Known Problems Son     No Known Problems Son     Anesth Problems Neg Hx      No current facility-administered medications for this encounter. PHYSICAL EXAM:  General: WD, WN. Alert, cooperative, no acute distress    HEENT: NC, Atraumatic. PERRLA, EOMI. Anicteric sclerae. Lungs:  CTA Bilaterally. No Wheezing/Rhonchi/Rales. Heart:  Regular  rhythm,  No murmur, No Rubs, No Gallops  Abdomen: Soft, Non distended, Non tender. +Bowel sounds, no HSM  Extremities: No c/c/e  Neurologic:  CN 2-12 gi, Alert and oriented X 3. No acute neurological distress   Psych:   Good insight. Not anxious nor agitated. The heart, lungs and mental status were satisfactory for the administration of MAC sedation and for the procedure. Mallampati score: 2     The patient was counseled at length about the risks of ofe Covid-19 in the alexa-operative and post-operative states including the recovery window of their procedure. The patient was made aware that ofe Covid-19 after a surgical procedure may worsen their prognosis for recovering from the virus and lend to a higher morbidity and or mortality risk. The patient was given the options of postponing their procedure. All of the risks, benefits, and alternatives were discussed. The patient does  wish to proceed with the procedure.       Assessment:   Personal history colon polyps    Plan:   Endoscopic procedure  MAC sedation

## 2022-10-28 NOTE — PROCEDURES
[address]  [address], [address]  [phone]                              Colonoscopy Procedure Note      Indications:    Personal history of colon polyps (screening only)     :  Cliff Bonds MD    Surgical Assistant: Endoscopy Technician-1: Jonathan Ayala  Endoscopy RN-1: Steven Dale RN  Endoscopy RN-2: Joe Lassiter RN    Implants: none    Referring Provider: Shaneka Benson MD    Sedation:  MAC anesthesia    Procedure Details:  After informed consent was obtained with all risks and benefits of procedure explained and preoperative exam completed, the patient was taken to the endoscopy suite and placed in the left lateral decubitus position. Upon sequential sedation as per above, a digital rectal exam was performed  And was normal.  The Olympus videocolonoscope  was inserted in the rectum and carefully advanced to the cecum, which was identified by the ileocecal valve and appendiceal orifice. The quality of preparation was good. The colonoscope was slowly withdrawn with careful evaluation between folds. Retroflexion in the rectum was performed and was normal..     Findings:   Rectum: no mucosal lesion appreciated  Grade 2 internal hemorrhoid(s); Sigmoid: no mucosal lesion appreciated  Descending Colon: no mucosal lesion appreciated  Transverse Colon: no mucosal lesion appreciated  Ascending Colon: 1  Sessile polyp(s), the largest 6 mm in size noted at hepatic flexure; Cecum: no mucosal lesion appreciated  Terminal Ileum: not intubated    Interventions:  1 complete polypectomy were performed using cold snare  and the polyps were  retrieved    Specimen Removed:    ID Type Source Tests Collected by Time Destination   1 : hepatic flexure polypectomy Preservative Hepatic Flexure  Gisela Whitman MD 10/28/2022 [id number] Pathology       Complications: None. EBL:  None. Recommendations:   -Await pathology.   -Repeat colonoscopy in 5 years.  -High fiber diet.     -Resume normal medication(s). -NO aspirin for 7 days     Discharge Disposition:  Home in the company of a  when able to ambulate.     Brandon Ware MD  10/28/2022  8:28 AM

## 2022-10-28 NOTE — DISCHARGE INSTRUCTIONS
[address] [facility].  [address] [facility] [address], [address]  [address]  [phone]  1950    COLON DISCHARGE INSTRUCTIONS    DISCOMFORT:  Redness at IV site- apply warm compress to area; if redness or soreness persist- contact your physician  There may be a slight amount of blood passed from the rectum  Gaseous discomfort- walking, belching will help relieve any discomfort      DIET:   High fiber diet. - however -  remember your colon is empty and a heavy meal will produce gas. Avoid these foods:  vegetables, fried / greasy foods, carbonated drinks for today  You may not  drink alcoholic beverages for at least 12 hours     ACTIVITY:  It is recommended that you spend the remainder of the day resting -  avoid any strenuous activity. You may not operate a vehicle for 12 hours  You may not  engage in an occupation involving machinery or appliances for rest of today    Avoid making any critical decisions for at least 24 hour    CALL M.D. ANY SIGN OF:   Increasing pain, nausea, vomiting  Abdominal distension (swelling)  New increased bleeding (oral or rectal)  Fever (chills)  Pain in chest area  Bloody discharge from nose or mouth  Shortness of breath    You may not  take any Advil, Aspirin, Ibuprofen, Motrin, Aleve, or Goodys for 7 days, ONLY  Tylenol as needed for pain. Post procedure diagnosis: 1. colon polyp  2. internal hemorrhoids      Post-procedure recommendations:  -Await pathology. -Repeat colonoscopy in 5 years.  -High fiber diet.     -Resume normal medication(s). -NO aspirin for 7 days     Follow-up Instructions:    Call Dr. Joseph Henson for any questions or problems. If we took a biopsy please call the office within 2 weeks to discuss your  pathology results. Telephone # [phone]         Learning About Coronavirus [phone])  Coronavirus [phone]): Overview  What is coronavirus (COVID-19)?   The coronavirus disease (COVID-19) is caused by a virus. It is an illness that was first found in [place], [place], in December 2019. It has since spread worldwide. The virus can cause fever, cough, and trouble breathing. In severe cases, it can cause pneumonia and make it hard to breathe without help. It can cause death. Coronaviruses are a large group of viruses. They cause the common cold. They also cause more serious illnesses like Middle East respiratory syndrome (MERS) and severe acute respiratory syndrome (SARS). COVID-19 is caused by a novel coronavirus. That means it's a new type that has not been seen in people before. This virus spreads person-to-person through droplets from coughing and sneezing. It can also spread when you are close to someone who is infected. And it can spread when you touch something that has the virus on it, such as a doorknob or a tabletop. What can you do to protect yourself from coronavirus (COVID-19)? The best way to protect yourself from getting sick is to: Avoid areas where there is an outbreak. Avoid contact with people who may be infected. Wash your hands often with soap or alcohol-based hand sanitizers. Avoid crowds and try to stay at least 6 feet away from other people. Wash your hands often, especially after you cough or sneeze. Use soap and water, and scrub for at least 20 seconds. If soap and water aren't available, use an alcohol-based hand . Avoid touching your mouth, nose, and eyes. What can you do to avoid spreading the virus to others? To help avoid spreading the virus to others:  Cover your mouth with a tissue when you cough or sneeze. Then throw the tissue in the trash. Use a disinfectant to clean things that you touch often. Stay home if you are sick or have been exposed to the virus. Don't go to school, work, or public areas. And don't use public transportation. If you are sick:  Leave your home only if you need to get medical care.  But call the doctor's office first so they know you're coming. And wear a face mask, if you have one. If you have a face mask, wear it whenever you're around other people. It can help stop the spread of the virus when you cough or sneeze. Clean and disinfect your home every day. Use household  and disinfectant wipes or sprays. Take special care to clean things that you grab with your hands. These include doorknobs, remote controls, phones, and handles on your refrigerator and microwave. And don't forget countertops, tabletops, bathrooms, and computer keyboards. When to call for help  Call 911 anytime you think you may need emergency care. For example, call if:  You have severe trouble breathing. (You can't talk at all.)  You have constant chest pain or pressure. You are severely dizzy or lightheaded. You are confused or can't think clearly. Your face and lips have a blue color. You pass out (lose consciousness) or are very hard to wake up. Call your doctor now if you develop symptoms such as:  Shortness of breath. Fever. Cough. If you need to get care, call ahead to the doctor's office for instructions before you go. Make sure you wear a face mask, if you have one, to prevent exposing other people to the virus. Where can you get the latest information? The following health organizations are tracking and studying this virus. Their websites contain the most up-to-date information. [facility] also learn what to do if you think you may have been exposed to the virus. U.S. Centers for Disease Control and Prevention (CDC): The CDC provides updated news about the disease and travel advice. The website also tells you how to prevent the spread of infection. www.cdc.gov  World Health Organization [facility]): WHO offers information about the virus outbreaks. WHO also has travel advice. www.who.int  Current as of: April 1, 2020               Content Version: 12.4  © 4937-9181 Healthwise, Incorporated.    Care instructions adapted under license by your healthcare professional. If you have questions about a medical condition or this instruction, always ask your healthcare professional. [place] [address] any warranty or liability for your use of this information.

## 2022-10-28 NOTE — ANESTHESIA PREPROCEDURE EVALUATION
Relevant Problems   NEUROLOGY   (+) Attention deficit disorder without mention of hyperactivity      CARDIOVASCULAR   (+) Mitral prolapse   (+) PVC (premature ventricular contraction)      RENAL FAILURE   (+) Calculus of kidney       Anesthetic History               Review of Systems / Medical History  Patient summary reviewed, nursing notes reviewed and pertinent labs reviewed    Pulmonary        Sleep apnea           Neuro/Psych         Psychiatric history     Cardiovascular    Hypertension  Valvular problems/murmurs: mitral insufficiency      Dysrhythmias : PVC  Hyperlipidemia    Exercise tolerance: >4 METS  Comments: Normal stress echo 2022   Frequent PVCs on holter   GI/Hepatic/Renal                Endo/Other             Other Findings              Physical Exam    Airway  Mallampati: I  TM Distance: > 6 cm  Neck ROM: normal range of motion   Mouth opening: Normal     Cardiovascular    Rhythm: regular  Rate: normal         Dental  No notable dental hx       Pulmonary  Breath sounds clear to auscultation               Abdominal         Other Findings            Anesthetic Plan    ASA: 3  Anesthesia type: MAC          Induction: Intravenous  Anesthetic plan and risks discussed with: Patient

## 2022-11-03 RX ORDER — ATORVASTATIN CALCIUM 20 MG/1
20 TABLET, FILM COATED ORAL DAILY
Qty: 90 TABLET | Refills: 3 | Status: SHIPPED | OUTPATIENT
Start: 2022-11-03

## 2022-11-03 NOTE — TELEPHONE ENCOUNTER
Request for Atorvastatin 20 mg daily. Last office visit 5/19/22, next office visit not yet scheduled. Refills per verbal order from Dr. Nadeen Hernandez.

## 2022-11-03 NOTE — TELEPHONE ENCOUNTER
Pt requested a refill on atorvastatin 20mg, pt almost out of medication         [facility] [phone] Negative

## 2022-11-15 ENCOUNTER — TELEPHONE (OUTPATIENT)
Dept: INTERNAL MEDICINE CLINIC | Age: 72
End: 2022-11-15

## 2022-11-16 ENCOUNTER — VIRTUAL VISIT (OUTPATIENT)
Dept: INTERNAL MEDICINE CLINIC | Age: 72
End: 2022-11-16
Payer: MEDICARE

## 2022-11-16 DIAGNOSIS — Z85.828 HISTORY OF SKIN CANCER: ICD-10-CM

## 2022-11-16 DIAGNOSIS — N40.0 BENIGN PROSTATIC HYPERPLASIA WITHOUT LOWER URINARY TRACT SYMPTOMS: ICD-10-CM

## 2022-11-16 DIAGNOSIS — I10 PRIMARY HYPERTENSION: ICD-10-CM

## 2022-11-16 DIAGNOSIS — Z00.00 MEDICARE ANNUAL WELLNESS VISIT, SUBSEQUENT: Primary | ICD-10-CM

## 2022-11-16 DIAGNOSIS — E78.00 HYPERCHOLESTEREMIA: ICD-10-CM

## 2022-11-16 DIAGNOSIS — R80.0 ISOLATED PROTEINURIA WITHOUT SPECIFIC MORPHOLOGIC LESION: ICD-10-CM

## 2022-11-16 DIAGNOSIS — I49.3 PVC (PREMATURE VENTRICULAR CONTRACTION): ICD-10-CM

## 2022-11-16 PROBLEM — G47.30 SLEEP APNEA: Status: ACTIVE | Noted: 2017-07-25

## 2022-11-16 PROBLEM — C43.72 MALIGNANT MELANOMA OF LEFT LATERAL THIGH (HCC): Status: ACTIVE | Noted: 2021-01-13

## 2022-11-16 PROBLEM — G47.30 SLEEP APNEA: Status: RESOLVED | Noted: 2017-07-25 | Resolved: 2022-11-16

## 2022-11-16 PROCEDURE — G8756 NO BP MEASURE DOC: HCPCS | Performed by: INTERNAL MEDICINE

## 2022-11-16 PROCEDURE — G8427 DOCREV CUR MEDS BY ELIG CLIN: HCPCS | Performed by: INTERNAL MEDICINE

## 2022-11-16 PROCEDURE — G8536 NO DOC ELDER MAL SCRN: HCPCS | Performed by: INTERNAL MEDICINE

## 2022-11-16 PROCEDURE — G0439 PPPS, SUBSEQ VISIT: HCPCS | Performed by: INTERNAL MEDICINE

## 2022-11-16 PROCEDURE — G8420 CALC BMI NORM PARAMETERS: HCPCS | Performed by: INTERNAL MEDICINE

## 2022-11-16 PROCEDURE — G0463 HOSPITAL OUTPT CLINIC VISIT: HCPCS | Performed by: INTERNAL MEDICINE

## 2022-11-16 PROCEDURE — 3017F COLORECTAL CA SCREEN DOC REV: CPT | Performed by: INTERNAL MEDICINE

## 2022-11-16 PROCEDURE — 1101F PT FALLS ASSESS-DOCD LE1/YR: CPT | Performed by: INTERNAL MEDICINE

## 2022-11-16 PROCEDURE — 99213 OFFICE O/P EST LOW 20 MIN: CPT | Performed by: INTERNAL MEDICINE

## 2022-11-16 PROCEDURE — G8432 DEP SCR NOT DOC, RNG: HCPCS | Performed by: INTERNAL MEDICINE

## 2022-11-16 RX ORDER — MELOXICAM 15 MG/1
15 TABLET ORAL DAILY
COMMUNITY

## 2022-11-16 NOTE — ASSESSMENT & PLAN NOTE
.  Lab Results   Component Value Date/Time    LDL, calculated 69 10/19/2021 09:49 AM       Tolerating statin therapy, plan to continue current regimen pending lab results  Recheck labs to assess for response to medication, whether LDL is at target, and monitor for side effects

## 2022-11-16 NOTE — PROGRESS NOTES
11/16/2022    Agnes March 1950 is a 74y.o. year old male established patient,   here for video visit to evaluate the following chief complaint(s):  Chief Complaint   Patient presents with    Annual Wellness Visit        Annual Wellness Visit- Subsequent Visit    Assessment & Plan:   Below is the assessment and plan developed based on review of pertinent history, physical exam, labs, studies, and medications. 1. Medicare annual wellness visit, subsequent  2. Primary hypertension  Assessment & Plan:   Checks BP at home and has been normal range  Orders:  -     METABOLIC PANEL, COMPREHENSIVE; Future  -     MICROALBUMIN, UR, RAND W/ MICROALB/CREAT RATIO; Future  -     CBC WITH AUTOMATED DIFF; Future  3. PVC (premature ventricular contraction)  Assessment & Plan:   Cards Dr Belen Perez  On flecanide for suppression, rare palpitations  4. Benign prostatic hyperplasia without lower urinary tract symptoms  Assessment & Plan:  Follows with [place] [facility]  Symptoms controlled with current regimen  5. History of skin cancer  Assessment & Plan:   Follows regularly with derm Dr Alfredito Guerin  6. Hypercholesteremia  Assessment & Plan:   .  Lab Results   Component Value Date/Time    LDL, calculated 69 10/19/2021 09:49 AM       Tolerating statin therapy, plan to continue current regimen pending lab results  Recheck labs to assess for response to medication, whether LDL is at target, and monitor for side effects    Orders:  -     METABOLIC PANEL, COMPREHENSIVE; Future  -     LIPID PANEL; Future  -     CBC WITH AUTOMATED DIFF; Future  7. 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 Subjective:      In addition to AWV, we followed up on chronic conditions as detailed above    Additional concerns: recent forearm pain, saw ortho and on meloxicam    Diet and exercise habits: jogging, hiking, pickleball, sailing    End of Life Planning: This was discussed with him today and he has an advanced directive - a copy HAS NOT been provided. Reviewed DNR/DNI and patient is not interested. Depression Screen:  3 most recent PHQ Screens 5/19/2022   Little interest or pleasure in doing things Not at all   Feeling down, depressed, irritable, or hopeless Not at all   Total Score PHQ 2 0         Fall Risk:   Fall Risk Assessment, last 12 mths 5/19/2022   Able to walk? Yes   Fall in past 12 months? 0   Do you feel unsteady? 0   Are you worried about falling 0   Number of falls in past 12 months -   Fall with injury? -       Abuse Screen:  Abuse Screening Questionnaire 10/19/2021   Do you ever feel afraid of your partner? N   Are you in a relationship with someone who physically or mentally threatens you? N   Is it safe for you to go home? Y       Do you average more than 1 drink per night or more than 7 drinks a week: Yes, 9 per week    In the past three months have you have had more than 4 drinks containing alcohol on one occasion: No            Functional Ability and Level of Safety:    Hearing: Hearing is good. Mild difficulty with background noise/crowds    Cognition Screen:   Has your family/caregiver stated any concerns about your memory: no    Cognitive Screening: Normal - Clock Drawing Test     Ambulation: with no difficulty     Activities of Daily Living: The home contains: no safety equipment. Patient does total self care        Adult Nutrition Screen:  No risk factors noted.      Health Maintenance:     Health Maintenance   Topic Date Due    Lipid Screen  10/19/2022    Depression Screen  05/19/2023    Medicare Yearly Exam  11/17/2023    DTaP/Tdap/Td series (2 - Td or Tdap) 03/21/2027    Colorectal Cancer Screening Combo  10/28/2027    Hepatitis C Screening  Completed    AAA Screening 73-69 YO Male Smoking Patients  Completed    Shingrix Vaccine Age 50>  Completed    Flu Vaccine  Completed    COVID-19 Vaccine  Completed    Pneumococcal 65+ years  Completed       Immunization History   Administered Date(s) Administered     Influenza, FLUZONE (age 72 y+), High Dose 10/19/2020    COVID-19, MODERNA BLUE border, Primary or Immunocompromised, (age 18y+), IM, 100 mcg/0.5mL 01/27/2021, 02/24/2021, 10/01/2021    COVID-19, PFIZER Bivalent BOOSTER, (age 12y+), IM, 30 mcg/0.3 mL dose 09/20/2022    Influenza High Dose Vaccine PF 10/09/2019    Influenza Vaccine 10/07/2015, 10/19/2020, 09/21/2021, 09/20/2022    Influenza Vaccine (Tri) Adjuvanted (>65 Yrs FLUAD TRI 84515) 09/26/2018    Influenza Vaccine Whole 12/05/2012    Influenza, FLUARIX, FLULAVAL, FLUZONE (age 10 mo+) AND AFLURIA, (age 1 y+), PF, 0.5mL 09/21/2016, 09/20/2017    Pneumococcal Conjugate (PCV-13) 04/07/2017    Pneumococcal Polysaccharide (PPSV-23) 12/17/2013    Td 01/01/2008    Tdap 03/21/2017    Zoster Recombinant 05/31/2018, 01/07/2019    Zoster Vaccine, Live 06/12/2014        AAA Screening: YES 2013    Immunizations:   Covid: up to date   Influenza: up to date   Tetanus: up to date   Shingles: up to date   Pneumonia: up to date  Cancer screening:   Prostate: guidelines reviewed, monitored by urologist  Colon: guidelines reviewed, up to date    Objective:   Constitutional: [x] Appears well-developed and well-nourished [x] No apparent distress      [] Abnormal -     Mental status: [x] Alert and awake  [x] Oriented to person/place/time [x] Able to follow commands    [] Abnormal -     Eyes:   EOM    [x]  Normal    [] Abnormal -   Sclera  [x]  Normal    [] Abnormal -          Discharge [x]  None visible   [] Abnormal -     HENT: [x] Normocephalic, atraumatic  [] Abnormal -   [x] Mouth/Throat: Mucous membranes are moist    External Ears [x] Normal  [] Abnormal -    Neck: [x] No visualized mass [] Abnormal -     Pulmonary/Chest: [x] Respiratory effort normal   [x] No visualized signs of difficulty breathing or respiratory distress        [] Abnormal -       Neurological:        [x] No Facial Asymmetry (Cranial nerve 7 motor function) (limited exam due to video visit)          [x] No gaze palsy        [] Abnormal -          Skin:        [x] No significant exanthematous lesions or discoloration noted on facial skin         [] Abnormal -            Psychiatric:       [x] Normal Affect [] Abnormal -                  Patient Care Team:  Jonathan Moise MD as PCP - General (Internal Medicine Physician)  Ngoc Boland MD as PCP - [facility]  Parth Castro MD (Cardiovascular Disease Physician)  Stiven Cee(Lydia) Carlita Worrell MD (Orthopedic Surgery)  Angelica Steel MD (Urology)  Nakita Marroquin MD (Gastroenterology)  Rachel Burkett MD (Dermatology Physician)  Brenda Strauss MD (Ophthalmology)  Nilo Nickerson MD (Urology)        Review of Systems   Constitutional:  Negative for chills and fever. Respiratory:  Negative for cough and shortness of breath. Cardiovascular:  Negative for chest pain and palpitations. Gastrointestinal:  Negative for abdominal pain, blood in stool, constipation, diarrhea, heartburn, nausea and vomiting. Musculoskeletal:  Positive for joint pain (R elbow). Negative for myalgias. Neurological:  Negative for tingling, focal weakness and headaches. Endo/Heme/Allergies:  Does not bruise/bleed easily. Psychiatric/Behavioral:  Negative for depression. The patient is not nervous/anxious and does not have insomnia. The following sections were reviewed & updated as appropriate: Problem List, Allergies, PMH, PSH, FH, and SH.             Patient-Reported Vitals 11/16/2022 9/2/2020 6/16/2020   Patient-Reported Weight 155 161 161 lbs   Patient-Reported Height - 5' 7\" 5'7   Patient-Reported Pulse 54 52 54   Patient-Reported Temperature 97.6 96.9 -   Patient-Reported SpO2 96 97 97   Patient-Reported Systolic  [phone]   Patient-Reported Diastolic 75 78 69 The following sections were reviewed & updated as appropriate: Problem List, Allergies, PMH, PSH, FH, and SH. Patient Active Problem List   Diagnosis Code    Hypercholesteremia E78.00    Tinnitus of both ears H93.13    Calculus of kidney N20.0    Insomnia, unspecified G47.00    Proteinuria R80.9    Attention deficit disorder without mention of hyperactivity F98.8    Encounter for long-term (current) use of other medications Z79.899    Unspecified vitamin D deficiency E55.9    Erectile dysfunction N52.9    Advanced directives, counseling/discussion Z71.89    PVC (premature ventricular contraction) I49.3    History of skin cancer Z85.828    Hypertension I10    Benign prostatic hyperplasia without lower urinary tract symptoms N40.0        Current Outpatient Medications   Medication Sig Dispense Refill    meloxicam (MOBIC) 15 mg tablet Take 15 mg by mouth daily. atorvastatin (LIPITOR) 20 mg tablet Take 1 Tablet by mouth daily. 90 Tablet 3    flecainide (TAMBOCOR) 50 mg tablet Take 0.5 Tablets by mouth daily. 45 Tablet 1    sildenafil citrate (VIAGRA) 100 mg tablet TAKE 1 TABLET BY MOUTH ONCE DAILY AS NEEDED FOR ERECTILE DYSFUNCTION 20 Tablet 0    terazosin (HYTRIN) 5 mg capsule Take 5 mg by mouth nightly. finasteride (PROSCAR) 5 mg tablet Take 5 mg by mouth every evening. Patient has no known allergies. Social History     Occupational History     Employer: [employer]   Tobacco Use    Smoking status: Former     Packs/day: 0.50     Years: 12.00     Pack years: 6.00     Types: Cigarettes     Start date: 1968     Quit date: 1970     Years since quittin.4    Smokeless tobacco: Never    Tobacco comments:     Occasional use during my early 25s for a year or two. Substance and Sexual Activity    Alcohol use:  Yes     Alcohol/week: 12.0 standard drinks     Types: 3 Glasses of wine, 9 Standard drinks or equivalent per week     Comment: A couple of glasses of wine or a whisky in the evening    Drug use: Never    Sexual activity: Yes     Partners: Female     Birth control/protection: None     Comment: Old age! Time spent on telemed visit: 35min    The patient was evaluated through a synchronous (real-time) audio-video encounter. The patient (or guardian if applicable) is aware that this is a billable service, which includes applicable co-pays. Verbal consent to proceed has been obtained. The visit was conducted pursuant to the emergency declaration under the [address] [place] [address], [address] [place] [address] authority and the [employer] and [employer] Act. Patient identification was verified, and a caregiver was present when appropriate. The patient was located at home in a state where the provider was licensed to provide care. Disclaimer:  Aspects of this note may have been generated using Dragon voice recognition software. Despite editing, there may be some syntax errors   We discussed the expected course, resolution and complications of the diagnosis(es) in detail. I have discussed any significant medication side effects and warnings with the patient when indicated. I advised them to contact the office if their condition worsens, changes or fails to improve as anticipated. Patient expressed understanding of the diagnosis and plan. An electronic signature was used to authenticate this note.   -- Danish Lie MD

## 2022-11-16 NOTE — PATIENT INSTRUCTIONS
Medicare Wellness Visit, Male    The best way to live healthy is to have a lifestyle where you eat a well-balanced diet, exercise regularly, limit alcohol use, and quit all forms of tobacco/nicotine, if applicable. Regular preventive services are another way to keep healthy. Preventive services (vaccines, screening tests, monitoring & exams) can help personalize your care plan, which helps you manage your own care. Screening tests can find health problems at the earliest stages, when they are easiest to treat. [facility] follows the current, evidence-based guidelines published by the [facility] ([facility]) when recommending preventive services for our patients. Because we follow these guidelines, sometimes recommendations change over time as research supports it. (For example, a prostate screening blood test is no longer routinely recommended for men with no symptoms). Of course, you and your doctor may decide to screen more often for some diseases, based on your risk and co-morbidities (chronic disease you are already diagnosed with). Preventive services for you include:  - Medicare offers their members a free annual wellness visit, which is time for you and your primary care provider to discuss and plan for your preventive service needs. Take advantage of this benefit every year!  -All adults over age 72 should receive the recommended pneumonia vaccines. Current USPSTF guidelines recommend a series of two vaccines for the best pneumonia protection.   -All adults should have a flu vaccine yearly and tetanus vaccine every 10 years.  -All adults age 48 and older should receive the shingles vaccines (series of two vaccines).        -All adults age 38-68 who are overweight should have a diabetes screening test once every three years.   -Other screening tests & preventive services for persons with diabetes include: an eye exam to screen for diabetic retinopathy, a kidney function test, a foot exam, and stricter control over your cholesterol.   -Cardiovascular screening for adults with routine risk involves an electrocardiogram (ECG) at intervals determined by the provider.   -Colorectal cancer screening should be done for adults age 54-65 with no increased risk factors for colorectal cancer. There are a number of acceptable methods of screening for this type of cancer. Each test has its own benefits and drawbacks. Discuss with your provider what is most appropriate for you during your annual wellness visit. The different tests include: colonoscopy (considered the best screening method), a fecal occult blood test, a fecal DNA test, and sigmoidoscopy.  -All adults born between [facility] should be screened once for Hepatitis C.  -An Abdominal Aortic Aneurysm (AAA) Screening is recommended for men age 73-68 who has ever smoked in their lifetime.      Here is a list of your current Health Maintenance items (your personalized list of preventive services) with a due date:  Health Maintenance Due   Topic Date Due    Cholesterol Test   10/19/2022     *COPY OF ADVANCED DIRECTIVE*

## 2022-12-02 DIAGNOSIS — I10 PRIMARY HYPERTENSION: ICD-10-CM

## 2022-12-02 DIAGNOSIS — R80.0 ISOLATED PROTEINURIA WITHOUT SPECIFIC MORPHOLOGIC LESION: ICD-10-CM

## 2022-12-02 DIAGNOSIS — E78.00 HYPERCHOLESTEREMIA: ICD-10-CM

## 2022-12-03 LAB
ALBUMIN SERPL-MCNC: 3.9 G/DL (ref 3.5–5)
ALBUMIN/GLOB SERPL: 1.7 {RATIO} (ref 1.1–2.2)
ALP SERPL-CCNC: 94 U/L (ref 45–117)
ALT SERPL-CCNC: 36 U/L (ref 12–78)
ANION GAP SERPL CALC-SCNC: 3 MMOL/L (ref 5–15)
AST SERPL-CCNC: 34 U/L (ref 15–37)
BASOPHILS # BLD: 0 K/UL (ref 0–0.1)
BASOPHILS NFR BLD: 1 % (ref 0–1)
BILIRUB SERPL-MCNC: 0.6 MG/DL (ref 0.2–1)
BUN SERPL-MCNC: 20 MG/DL (ref 6–20)
BUN/CREAT SERPL: 24 (ref 12–20)
CALCIUM SERPL-MCNC: 9.2 MG/DL (ref 8.5–10.1)
CHLORIDE SERPL-SCNC: 108 MMOL/L (ref 97–108)
CHOLEST SERPL-MCNC: 125 MG/DL
CO2 SERPL-SCNC: 29 MMOL/L (ref 21–32)
CREAT SERPL-MCNC: 0.85 MG/DL (ref 0.7–1.3)
CREAT UR-MCNC: 160 MG/DL
DIFFERENTIAL METHOD BLD: NORMAL
EOSINOPHIL # BLD: 0.1 K/UL (ref 0–0.4)
EOSINOPHIL NFR BLD: 2 % (ref 0–7)
ERYTHROCYTE [DISTWIDTH] IN BLOOD BY AUTOMATED COUNT: 11.9 % (ref 11.5–14.5)
GLOBULIN SER CALC-MCNC: 2.3 G/DL (ref 2–4)
GLUCOSE SERPL-MCNC: 90 MG/DL (ref 65–100)
HCT VFR BLD AUTO: 45.6 % (ref 36.6–50.3)
HDLC SERPL-MCNC: 45 MG/DL
HDLC SERPL: 2.8 {RATIO} (ref 0–5)
HGB BLD-MCNC: 15.3 G/DL (ref 12.1–17)
IMM GRANULOCYTES # BLD AUTO: 0 K/UL (ref 0–0.04)
IMM GRANULOCYTES NFR BLD AUTO: 0 % (ref 0–0.5)
LDLC SERPL CALC-MCNC: 65.6 MG/DL (ref 0–100)
LYMPHOCYTES # BLD: 0.9 K/UL (ref 0.8–3.5)
LYMPHOCYTES NFR BLD: 18 % (ref 12–49)
MCH RBC QN AUTO: 32.7 PG (ref 26–34)
MCHC RBC AUTO-ENTMCNC: 33.6 G/DL (ref 30–36.5)
MCV RBC AUTO: 97.4 FL (ref 80–99)
MICROALBUMIN UR-MCNC: 2.28 MG/DL
MICROALBUMIN/CREAT UR-RTO: 14 MG/G (ref 0–30)
MONOCYTES # BLD: 0.6 K/UL (ref 0–1)
MONOCYTES NFR BLD: 12 % (ref 5–13)
NEUTS SEG # BLD: 3.2 K/UL (ref 1.8–8)
NEUTS SEG NFR BLD: 67 % (ref 32–75)
NRBC # BLD: 0 K/UL (ref 0–0.01)
NRBC BLD-RTO: 0 PER 100 WBC
PLATELET # BLD AUTO: 169 K/UL (ref 150–400)
PMV BLD AUTO: 10.6 FL (ref 8.9–12.9)
POTASSIUM SERPL-SCNC: 4.7 MMOL/L (ref 3.5–5.1)
PROT SERPL-MCNC: 6.2 G/DL (ref 6.4–8.2)
RBC # BLD AUTO: 4.68 M/UL (ref 4.1–5.7)
SODIUM SERPL-SCNC: 140 MMOL/L (ref 136–145)
TRIGL SERPL-MCNC: 72 MG/DL (ref ?–150)
UR CULT HOLD, URHOLD: NORMAL
VLDLC SERPL CALC-MCNC: 14.4 MG/DL
WBC # BLD AUTO: 4.8 K/UL (ref 4.1–11.1)

## 2023-02-02 NOTE — TELEPHONE ENCOUNTER
Requested Prescriptions     Pending Prescriptions Disp Refills    sildenafil citrate (VIAGRA) 100 mg tablet 20 Tablet [address] [facility], [address] [place] [address]  [phone]

## 2023-02-03 RX ORDER — SILDENAFIL 100 MG/1
100 TABLET, FILM COATED ORAL AS NEEDED
Qty: 20 TABLET | Refills: 1 | Status: SHIPPED | OUTPATIENT
Start: 2023-02-03

## 2023-03-31 RX ORDER — FLECAINIDE ACETATE 50 MG/1
25 TABLET ORAL DAILY
Qty: 45 TABLET | Refills: 1 | Status: SHIPPED | OUTPATIENT
Start: 2023-03-31

## 2023-04-20 ENCOUNTER — OFFICE VISIT (OUTPATIENT)
Dept: CARDIOLOGY CLINIC | Age: 73
End: 2023-04-20
Payer: MEDICARE

## 2023-04-20 VITALS
DIASTOLIC BLOOD PRESSURE: 70 MMHG | OXYGEN SATURATION: 97 % | SYSTOLIC BLOOD PRESSURE: 110 MMHG | HEIGHT: 67 IN | WEIGHT: 160 LBS | HEART RATE: 47 BPM | BODY MASS INDEX: 25.11 KG/M2 | RESPIRATION RATE: 16 BRPM

## 2023-04-20 DIAGNOSIS — R00.1 SINUS BRADYCARDIA: Primary | ICD-10-CM

## 2023-04-20 DIAGNOSIS — I49.3 PVC (PREMATURE VENTRICULAR CONTRACTION): ICD-10-CM

## 2023-04-20 DIAGNOSIS — Z51.81 ENCOUNTER FOR MONITORING FLECAINIDE THERAPY: ICD-10-CM

## 2023-04-20 DIAGNOSIS — Z79.899 ENCOUNTER FOR MONITORING FLECAINIDE THERAPY: ICD-10-CM

## 2023-04-20 DIAGNOSIS — I44.0 FIRST DEGREE AV BLOCK: ICD-10-CM

## 2023-04-20 PROCEDURE — G0463 HOSPITAL OUTPT CLINIC VISIT: HCPCS | Performed by: INTERNAL MEDICINE

## 2023-04-20 PROCEDURE — 93005 ELECTROCARDIOGRAM TRACING: CPT | Performed by: INTERNAL MEDICINE

## 2023-04-20 NOTE — PROGRESS NOTES
Cardiac Electrophysiology OFFICE Note     Subjective: Kathrine Simmons is a 74 y.o. male patient, retired , who presents for follow up of PVCs. Has had PVCs for several years, had been well controlled with flecainide between 25 mg qhs. Symptoms had been quiescent, but has noted a few episodes of symptomatic PVCs few minutes each tiume   He felt tired sometimes when walking up slopes     ECG on 2022 showed SB 47 bpm with 1st degree  ms    Normal LVEF on last check in  via echo. No ischemia on nuclear stress test in 2016. Previous:   Holter prior to flecainide showed 33K PVCs, mostly unifocal, appeared to originate from outflow tract, but additional PVCs with superior axis, originating from alternate location. 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 Races sailboats. Mother  of CHF at age 79 yo and father  of CVA 79 yo.   5 children (all in good health), . Half brother may have a pacemaker. Patient Problem list  · Advanced directives, counseling/discussion 2016   · PVC (premature ventricular contraction) 2016   · Erectile dysfunction 2016   · Encounter for long-term (current) use of other medications 2015   · Unspecified vitamin D deficiency 2015   · Attention deficit disorder without mention of hyperactivity 2014   · Proteinuria 2014   · Elevated blood pressure reading without diagnosis of hypertension 2014   · Tinnitus of both ears 2013   · Urticaria 2013   · Calculus of kidney 2013   · Insomnia, unspecified 2013   · Mitral prolapse 2012   · Hypercholesteremia 2012     Current Outpatient Medications   Medication Sig Dispense Refill   · terazosin (HYTRIN) 5 mg capsule Take 5 mg by mouth nightly.    · flecainide (TAMBOCOR) 50 mg tablet Take 0.5 Tablets by mouth daily. 45 Tablet 1   · atorvastatin (LIPITOR) 20 mg tablet Take 1 Tablet by mouth daily. 90 Tablet 3   · nitrofurantoin, macrocrystal-monohydrate, (MACROBID) 100 mg capsule   · sildenafil citrate (VIAGRA) 100 mg tablet Take 1 Tab by mouth daily as needed for Erectile Dysfunction. 20 Tab 5   · finasteride (PROSCAR) 5 mg tablet Take 5 mg by mouth every evening. · tamsulosin (FLOMAX) 0.4 mg capsule Take 0.4 mg by mouth two (2) times a day.  (Patient not taking: Reported on 3/29/2022)     No Known Allergies   Past Medical History:   Diagnosis Date   · Arrhythmia   PVCs   · Calculus of kidney    · Depression   · Hypercholesterolemia   · Hyperlipidemia   · Hypertension   marginally high readings per pt - high 130s + per pt   · Kidney stone   · Mitral valve prolapse   · Sleep apnea   mild - uses mouth piece     Past Surgical History:   Procedure Laterality Date   · COLONOSCOPY N/A 2019   COLONOSCOPY performed by Gloria Rodriguez MD at [facility]COPY   · ENDOSCOPY, COLON, DIAGNOSTIC 2005   polyp- dr Dyane Libman, ,    · HX GI   COLONOSCOPY   · HX HEENT   uvulectomy   · HX HEENT   wisdom teeth removed   · HX ORTHOPAEDIC Right 2019   shoulder arthro   · HX VASECTOMY      Family History   Problem Relation Age of Onset   · Heart Disease Mother   · Cancer Mother      bladder   · Heart Failure Mother      CHF   · Stroke Father   · No Known Problems Sister   · No Known Problems Brother   · No Known Problems Daughter   · No Known Problems Daughter   · No Known Problems Son   · No Known Problems Son   · No Known Problems Son   · Anesth Problems Neg Hx     Social History     Tobacco Use   · Smoking status: Former Smoker   Packs/day: 0.50   Years: 12.00   Pack years: 6.00   Types: Cigarettes   Quit date: 1975   Years since quittin.0   · Smokeless tobacco: Never Used   · Tobacco comment: quit 45 yrs ago - was an occasionally smoker in college/law school   Substance Use Topics   · Alcohol use: Yes   Comment: glass of wine a few times a week if that         Review of Systems: All other review of systems otherwise negative. Constitutional: Negative for fever, chills, weight loss, malaise/fatigue. HEENT: Negative for nosebleeds, vision changes. Respiratory: Negative for cough, hemoptysis, sputum production, and wheezing. Cardiovascular no orthopnea, claudication, leg swelling, syncope, and PND. + palpitations   Gastrointestinal: Negative for nausea, vomiting, diarrhea, constipation, blood in stool and melena. Genitourinary: Negative for dysuria, and hematuria. Musculoskeletal: Negative for myalgias, arthralgia. Skin: Negative for rash. Heme: Does not bleed or bruise easily. Neurological: Negative for speech change and focal weakness. Objective:   Visit Vitals  /70   Pulse (!) 47   Resp 16   Ht 5' 7\" (1.702 m)   Wt 160 lb (72.6 kg)   SpO2 97%   BMI 25.06 kg/m²   '²     Physical Exam:   Constitutional: Well-developed and well-nourished. No respiratory distress. Head: Normocephalic and atraumatic. Eyes: Pupils are equal, round. ENT: Hearing grossly normal.   Neck: Supple. No JVD present. Cardiovascular: Bradycardic rate, regular rhythm. Exam reveals no gallop and no friction rub. No murmur heard. Pulmonary/Chest: Effort normal and breath sounds normal. No wheezes. Abdominal: Soft, no tenderness. Musculoskeletal: Normal ROM & gait. Vasc/lymphatic: No edema. Neurological: Alert,oriented. Skin: Skin is warm and dry. Psychiatric: Normal mood and affect. Behavior is normal. Judgment and thought content normal.       Assessment/Plan:     Imaging/Studies:   Holter (7/29/2020): HR 41-92, average 57.  3 PVCs and 45 PACs. First degree AV block. Exercise stress nuclear test (08/2016): Good exercise capacity. No ischemia on ECG, PVC, but there are frequent PACs. BP was very high 250/100 with exercise. LVEF 69%, no ischemia or infarct.      Echo (03/21/2016): LVEF 60-65%, no RWMA. Mild MR.     06/28/22    ECHO STRESS 07/05/2022 7/7/2022    Interpretation Summary    Study Impression: The study is normal.    Post-stress Echo: The post-stress echo showed no new wall motion abnormalities. Left Ventricle: Normal left ventricular systolic function with a visually estimated EF of 55 - 60%. Left ventricle size is normal. Mildly increased wall thickness. Findings consistent with mild concentric hypertrophy. Normal wall motion. ECG: Stress ECG was negative for ischemia. ECG: Resting ECG demonstrates sinus bradycardia and first-degree AV block. Stress Test: A Aldo protocol stress test was performed. Overall, the patient's exercise capacity was excellent for their age. The patient reached stage 5 of the protocol after exercising for 13 min and 0 sec. Hemodynamics are adequate for diagnosis. Blood pressure demonstrated a normal response and heart rate demonstrated a normal response to stress. The patient's heart rate recovery was normal.    Rare PAC  No PVC    Signed by: Alecia Cash MD on 7/5/2022 10:21 PM        ICD-10-CM ICD-9-CM    1. Sinus bradycardia  R00.1 427.89       2. PVC (premature ventricular contraction)  I49.3 427.69 AMB POC EKG ROUTINE W/ 12 LEADS, INTER & REP      3. First degree AV block  I44.0 426.11       4. Encounter for monitoring flecainide therapy  Z51.81 V58.83     Z79.899 V58.69          ECG as above    PVCs: mildly symptomatic, on very low dose flecainide once daily. ECG showed sinus bradycardia 47 bpm with 1st degree AVB & narrow QRS. He has not been interested in ablation and I recommend to wait longer before pacemaker is needed     Sinus bradycardia: mostly Asymptomatic, down to 47 bpm on recent ECG Also with 1st degree AVB. HTN: BP had been high sometimes with slow HR, but now better controlled.        Future Appointments   Date Time Provider Humaira Ni   11/8/2023  8:00 AM Brianda Jolly MD [facility] WILLIAM BS AMB Thank you for involving me in this patient's care and please call with further concerns or questions. Dennis Rao M.D.    Electrophysiology/Cardiology   [facility] [place]   [facility] [address], [facility] [address] [place] [address]   [phone]

## 2023-05-12 NOTE — PROGRESS NOTES
Cardiac Electrophysiology Office Note     Subjective: Alysha Pastor is a 69 y.o. male patient who is seen for evaluation of PVCs   He was kindly referred by Dr. Eulogio Pierre. Interim hx:   He is here today for follow up because he was having worsening palpitations/PVC, he was instructed to increase his flecainide, but he did not increase the flecainide. He was recently seen 7/19/17 and was doing well. He has been traveling to [facility] and [place] visiting his daughter who is studying abroad. He has been hiking and sailing. He exercises a couple days a week. Past hx:  He reports he has PVCs for a \"long time\". For several years it has been transient and rarely bothered him. The past couple months he has noticed them more and thinks they have been occuring more frequently after spring break trip with 2 children in high school  He reports he feels the sensation of a skipped beat. Associated symptoms include \"queezy feeling\". He started the flecainide when he returned from his trip to [place]. So far he is tolerating the flecainide , no lightheadedness or dizziness. No chest pain or SOB. He had emailed us before his trip and reported that he thought the PVCs were actually bothering him more than he thought they did and that the PVCs were disturbing his sleep. He says that when he went on his trip he had no symptoms of PVCs (had not started flecainide yet). He started the flecainide when he got back and he thinks it is helping, but he sometimes has brief episodes of PVCs when he is exercising. Pmhx includes hyperlipidemia. Exercise stress nuclear test 8/2016 showed   Good exercise capacity. No ischemia on ECG, PVC, but there are frequent PACs  BP was very high 250/100 with exercise.  Perfusion images showed diaphragmatic attenuation artifacts in the inferior and inferolateral walls (base to distal)  GATED SPECT: LVEF 69% normal wall motion and thickening    Overall study: no ischemia or infarct  PACs but no PVC  Flecainide is effective in suppressing PVCs  BP is high and recommend BP medication: norvasc 5 mg every day to start    Holter before flecainide showed 33 thousands PVCs, mostly one morphology and appears to come from outflow tract but there is another PVC with superior axis and not coming from same place     Normal stress echo  and normal EF on recent echo        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 5 children (all in good health), . Half brother may have a pacemaker. Stress echo 12 with Dr Isabelle Heredia  Normal resting electrocardiogram.  Above average functional capacity (>20%).   Appropriate heart rate response to exercise.  Normal resting blood pressure.  Appropriate blood pressure response to exercise. No chest pain.  No arrhythmias.  No ST changes.  Overall impression: Normal stress electrocardiogram.  Resting echo with normal regional and  global contractility.  Post exercise, uniformly enhanced myocardial contractility.       Patient Active Problem List    Diagnosis Date Noted    Advanced directives, counseling/discussion 2016    PVC (premature ventricular contraction) 2016    Erectile dysfunction 2016    Encounter for long-term (current) use of other medications 2015    Unspecified vitamin D deficiency 2015    Attention deficit disorder without mention of hyperactivity 2014    Proteinuria 2014    Elevated blood pressure reading without diagnosis of hypertension 2014    Tinnitus of both ears 2013    Urticaria 2013    Calculus of kidney 2013    Insomnia, unspecified 2013    Mitral prolapse 2012    Hypercholesteremia 2012     Current Outpatient Prescriptions   Medication Sig Dispense Refill    flecainide (TAMBOCOR) 100 mg tablet Take 1 Tab by mouth two (2) times a day. 180 Tab 1    tamsulosin (FLOMAX) 0.4 mg capsule Take 0.4 mg by mouth daily.  amLODIPine (NORVASC) 5 mg tablet Take 1 Tab by mouth daily. 90 Tab 1    atorvastatin (LIPITOR) 20 mg tablet Take 1 Tab by mouth daily. 90 Tab 3    sildenafil citrate (VIAGRA) 100 mg tablet Take 1 Tab by mouth as needed. 6 Tab 11    aspirin delayed-release 81 mg tablet Take  by mouth daily. No Known Allergies  Past Medical History:   Diagnosis Date    Calculus of kidney 2011    Depression     Hypercholesterolemia     Hyperlipidemia     Kidney stone     Mitral valve prolapse     Polycystic liver disease      Past Surgical History:   Procedure Laterality Date    ENDOSCOPY, COLON, DIAGNOSTIC  2005    polyp- dr Jazmin Miller, 2012, due 16    HX HEENT      uvulectomy    HX VASECTOMY  2002     Family History   Problem Relation Age of Onset    Heart Disease Mother     Cancer Mother     Stroke Mother     Stroke Father     No Known Problems Sister     No Known Problems Brother      Social History   Substance Use Topics    Smoking status: Former Smoker     Packs/day: 1.00     Years: 12.00     Types: Cigarettes    Smokeless tobacco: Never Used    Alcohol use Yes      Comment: wine 1-2 glasse per day         Review of Systems:   Constitutional: Negative for fever, chills, weight loss, malaise/fatigue. HEENT: Negative for nosebleeds, vision changes. Respiratory: Negative for cough, hemoptysis, sputum production, and wheezing. Cardiovascular: Negative for chest pain, + episodic palpitations, no orthopnea, claudication, leg swelling, syncope, and PND. Gastrointestinal: Negative for nausea, vomiting, diarrhea, constipation, blood in stool and melena. Genitourinary: Negative for dysuria, and hematuria. Musculoskeletal: Negative for myalgias, arthralgia. Skin: Negative for rash. Heme: Does not bleed or bruise easily.    Neurological: Negative for speech change and focal Quality 130: Documentation Of Current Medications In The Medical Record: Current Medications Documented weakness     Objective:     Visit Vitals    /68 (BP 1 Location: Left arm)    Pulse (!) 56    Resp 18    Ht 5' 7\" (1.702 m)    Wt 169 lb 3.2 oz (76.7 kg)    SpO2 97%    BMI 26.5 kg/m2      Physical Exam:   Constitutional: well-developed and well-nourished. No distress. Head: Normocephalic and atraumatic. Eyes: Pupils are equal, round  Neck: supple. No JVD present. Cardiovascular: slow rate, regular rhythm and normal heart sounds. Exam reveals no gallop and no friction rub. No murmur heard. Pulmonary/Chest: Effort normal and breath sounds normal. No wheezes. Abdominal: Soft, no tenderness. Musculoskeletal: no edema. Neurological: alert,oriented. Skin: Skin is warm and dry  Psychiatric: normal mood and affect. Behavior is normal. Judgment and thought content normal.      EKG: sinus bradycardia HR 51 bpm, 1 st degree Av block   Assessment/Plan:       ICD-10-CM ICD-9-CM    1. PVC (premature ventricular contraction) I49.3 427.69 AMB POC EKG ROUTINE W/ 12 LEADS, INTER & REP   2. Essential hypertension I10 401.9    3. High risk medication use Z79.899 V58.69    Continue Flecainide 50 mg BID, ECG today shows sinus bradycardia, normal QRS duration but he also has known first degree av block. If he has another episode of PVCs we can add 50 mg more of flecainide to 50 mg BID. He cannot use higher dose due to concern for worsening sinus rate and av node conduction as well  Briefly reviewed PVC ablation if he is unable to tolerate or fails medications.    He agrees to wait more and see how he does   In the past 2 weeks he has felt better so he wants to continue with 50 mg bid flecainide (he will split 100 mg tablet he has)        Follow-up Disposition:    Future Appointments  Date Time Provider Humaira Shanksi   1/18/2018 8:40 AM Khoa Ibrahim  [address]   3/26/2018 9:00 AM Josy Robbins MD [address] [facility]         Thank you for involving me in this patient's care and please call Detail Level: Detailed with further concerns or questions. Shell Martinez M.D.   Electrophysiology/Cardiology  [facility] [place]  [facility] [address], [address] [facility], [address]  [phone] Additional Notes: Patient does not have a health care proxy. Quality 110: Preventive Care And Screening: Influenza Immunization: Influenza Immunization Administered during Influenza season Quality 431: Preventive Care And Screening: Unhealthy Alcohol Use - Screening: Patient not identified as an unhealthy alcohol user when screened for unhealthy alcohol use using a systematic screening method Quality 47: Advance Care Plan: Advance Care Planning discussed and documented; advance care plan or surrogate decision maker documented in the medical record. Quality 226: Preventive Care And Screening: Tobacco Use: Screening And Cessation Intervention: Patient screened for tobacco use and is an ex/non-smoker Quality 111:Pneumonia Vaccination Status For Older Adults: Pneumococcal vaccine (PPSV23) administered on or after patient’s 60th birthday and before the end of the measurement period

## 2023-09-29 RX ORDER — FLECAINIDE ACETATE 50 MG/1
TABLET ORAL
Qty: 45 TABLET | Refills: 1 | Status: SHIPPED | OUTPATIENT
Start: 2023-09-29

## 2023-09-29 NOTE — TELEPHONE ENCOUNTER
Request for flecainide 50 mg. Last office visit 4/20/23, next office visit 5/14/24. Refills per verbal order from Dr. Oksana Goldstein.

## 2023-10-12 NOTE — PROGRESS NOTES
Fajayleen from [facility]. Went to ER on 10/6. Dx again with osteochondroma and referred to [facility].   HISTORY OF PRESENT ILLNESS  Roma Cranker is a 77 y.o. male. HPI Subjective:  Nikky Rosas is seen today for complete exam and follow up of chronic problems. 1. Preventive medicine. He is due for labs and colorectal cancer screening with stool OB. A kit was provided. He is up to date with colonoscopy, vaccinations and urology follow up. I will check his PSA with routine labs, however. 2. Chronic problems are reviewed. He is due for cholesterol check. He denies medication side effects or any cardiac symptom. He has sought a second opinion regarding his chronic PVCs. He will continue to follow up with Dr. Kalpana Funk. Lastly, he has some shoulder pain and is following up with ortho, Dr. Whit Prieto. 3. New problem reviewed. He had an accident with his boat trailer and sustained an ear laceration. He has sutures in the right ear. He will return at the end of the week for suture removal.  The wound appears to be healing nicely. We counseled regarding healthy lifestyle issues including diet, exercise and stress management. Family history, social history, etc. Are reviewed and updated, see electronic record. Review of Systems   Constitutional: Negative for weight loss. Respiratory: Negative. Cardiovascular: Positive for palpitations. Negative for chest pain, leg swelling and PND. Gastrointestinal: Negative. Genitourinary: Negative. Musculoskeletal: Positive for joint pain. Negative for myalgias. Neurological: Negative for focal weakness. Physical Exam   Constitutional: He is oriented to person, place, and time. He appears well-developed and well-nourished. No distress. HENT:   Head: Normocephalic and atraumatic. Right Ear: Tympanic membrane, external ear and ear canal normal.   Left Ear: Tympanic membrane, external ear and ear canal normal.   Ears:    Eyes: Pupils are equal, round, and reactive to light. EOM are normal. Right eye exhibits no discharge.  Left eye exhibits no discharge. Neck: Normal range of motion. Neck supple. Carotid bruit is not present. No thyromegaly present. Cardiovascular: Normal rate, regular rhythm, normal heart sounds and intact distal pulses. Exam reveals no gallop and no friction rub. No murmur heard. Pulmonary/Chest: Effort normal and breath sounds normal. No respiratory distress. He has no wheezes. He has no rales. Abdominal: Soft. Bowel sounds are normal. He exhibits no distension and no mass. There is no tenderness. There is no rebound and no guarding. Musculoskeletal: Normal range of motion. He exhibits no edema or tenderness. Lymphadenopathy:     He has no cervical adenopathy. Neurological: He is alert and oriented to person, place, and time. He has normal reflexes. Skin: Skin is warm and dry. No rash noted. Psychiatric: He has a normal mood and affect. His behavior is normal.   Nursing note and vitals reviewed. ASSESSMENT and PLAN  Diagnoses and all orders for this visit:    1. Routine general medical examination at a health care facility  -     TSH 3RD GENERATION  -     UA/M W/RFLX CULTURE, ROUTINE  -     LIPID PANEL  -     CBC WITH AUTOMATED DIFF  -     METABOLIC PANEL, COMPREHENSIVE  -     PSA SCREENING (SCREENING)    2. Hypercholesteremia- Check lipid panel and appropriate studies to rule out med side effects yearly.      3. Screen for colon cancer  -     OCCULT BLOOD IMMUNOASSAY,DIAGNOSTIC    Other orders  -     MICROSCOPIC EXAMINATION

## 2023-10-31 ENCOUNTER — PATIENT MESSAGE (OUTPATIENT)
Age: 73
End: 2023-10-31

## 2023-10-31 NOTE — TELEPHONE ENCOUNTER
OK to stop flecainide completely. If there's an increase in symptomatic PVCs, we can check a monitor, but ok to wait & see for now.

## 2023-11-05 SDOH — HEALTH STABILITY: PHYSICAL HEALTH: ON AVERAGE, HOW MANY MINUTES DO YOU ENGAGE IN EXERCISE AT THIS LEVEL?: 90 MIN

## 2023-11-05 SDOH — ECONOMIC STABILITY: FOOD INSECURITY: WITHIN THE PAST 12 MONTHS, YOU WORRIED THAT YOUR FOOD WOULD RUN OUT BEFORE YOU GOT MONEY TO BUY MORE.: NEVER TRUE

## 2023-11-05 SDOH — ECONOMIC STABILITY: TRANSPORTATION INSECURITY
IN THE PAST 12 MONTHS, HAS LACK OF TRANSPORTATION KEPT YOU FROM MEETINGS, WORK, OR FROM GETTING THINGS NEEDED FOR DAILY LIVING?: NO

## 2023-11-05 SDOH — HEALTH STABILITY: PHYSICAL HEALTH: ON AVERAGE, HOW MANY DAYS PER WEEK DO YOU ENGAGE IN MODERATE TO STRENUOUS EXERCISE (LIKE A BRISK WALK)?: 5 DAYS

## 2023-11-05 SDOH — ECONOMIC STABILITY: HOUSING INSECURITY
IN THE LAST 12 MONTHS, WAS THERE A TIME WHEN YOU DID NOT HAVE A STEADY PLACE TO SLEEP OR SLEPT IN A SHELTER (INCLUDING NOW)?: NO

## 2023-11-05 SDOH — ECONOMIC STABILITY: FOOD INSECURITY: WITHIN THE PAST 12 MONTHS, THE FOOD YOU BOUGHT JUST DIDN'T LAST AND YOU DIDN'T HAVE MONEY TO GET MORE.: NEVER TRUE

## 2023-11-05 SDOH — ECONOMIC STABILITY: INCOME INSECURITY: HOW HARD IS IT FOR YOU TO PAY FOR THE VERY BASICS LIKE FOOD, HOUSING, MEDICAL CARE, AND HEATING?: NOT HARD AT ALL

## 2023-11-05 ASSESSMENT — PATIENT HEALTH QUESTIONNAIRE - PHQ9
SUM OF ALL RESPONSES TO PHQ9 QUESTIONS 1 & 2: 0
2. FEELING DOWN, DEPRESSED OR HOPELESS: 0
SUM OF ALL RESPONSES TO PHQ QUESTIONS 1-9: 0
1. LITTLE INTEREST OR PLEASURE IN DOING THINGS: 0
SUM OF ALL RESPONSES TO PHQ QUESTIONS 1-9: 0

## 2023-11-05 ASSESSMENT — LIFESTYLE VARIABLES
HOW OFTEN DURING THE LAST YEAR HAVE YOU FAILED TO DO WHAT WAS NORMALLY EXPECTED FROM YOU BECAUSE OF DRINKING: 0
HOW OFTEN DURING THE LAST YEAR HAVE YOU HAD A FEELING OF GUILT OR REMORSE AFTER DRINKING: 0
HOW OFTEN DO YOU HAVE SIX OR MORE DRINKS ON ONE OCCASION: 1
HOW OFTEN DO YOU HAVE A DRINK CONTAINING ALCOHOL: 5
HOW OFTEN DURING THE LAST YEAR HAVE YOU NEEDED AN ALCOHOLIC DRINK FIRST THING IN THE MORNING TO GET YOURSELF GOING AFTER A NIGHT OF HEAVY DRINKING: 0
HOW OFTEN DURING THE LAST YEAR HAVE YOU BEEN UNABLE TO REMEMBER WHAT HAPPENED THE NIGHT BEFORE BECAUSE YOU HAD BEEN DRINKING: NEVER
HAVE YOU OR SOMEONE ELSE BEEN INJURED AS A RESULT OF YOUR DRINKING: NO
HAS A RELATIVE, FRIEND, DOCTOR, OR ANOTHER HEALTH PROFESSIONAL EXPRESSED CONCERN ABOUT YOUR DRINKING OR SUGGESTED YOU CUT DOWN: 0
HOW MANY STANDARD DRINKS CONTAINING ALCOHOL DO YOU HAVE ON A TYPICAL DAY: 1
HOW MANY STANDARD DRINKS CONTAINING ALCOHOL DO YOU HAVE ON A TYPICAL DAY: 1 OR 2
HOW OFTEN DO YOU HAVE A DRINK CONTAINING ALCOHOL: 4 OR MORE TIMES A WEEK
HOW OFTEN DURING THE LAST YEAR HAVE YOU FOUND THAT YOU WERE NOT ABLE TO STOP DRINKING ONCE YOU HAD STARTED: NEVER
HOW OFTEN DURING THE LAST YEAR HAVE YOU BEEN UNABLE TO REMEMBER WHAT HAPPENED THE NIGHT BEFORE BECAUSE YOU HAD BEEN DRINKING: 0
HAS A RELATIVE, FRIEND, DOCTOR, OR ANOTHER HEALTH PROFESSIONAL EXPRESSED CONCERN ABOUT YOUR DRINKING OR SUGGESTED YOU CUT DOWN: NO
HOW OFTEN DURING THE LAST YEAR HAVE YOU HAD A FEELING OF GUILT OR REMORSE AFTER DRINKING: NEVER
HOW OFTEN DURING THE LAST YEAR HAVE YOU FOUND THAT YOU WERE NOT ABLE TO STOP DRINKING ONCE YOU HAD STARTED: 0
HOW OFTEN DURING THE LAST YEAR HAVE YOU FAILED TO DO WHAT WAS NORMALLY EXPECTED FROM YOU BECAUSE OF DRINKING: NEVER
HAVE YOU OR SOMEONE ELSE BEEN INJURED AS A RESULT OF YOUR DRINKING: 0
HOW OFTEN DURING THE LAST YEAR HAVE YOU NEEDED AN ALCOHOLIC DRINK FIRST THING IN THE MORNING TO GET YOURSELF GOING AFTER A NIGHT OF HEAVY DRINKING: NEVER

## 2023-11-06 ENCOUNTER — TELEPHONE (OUTPATIENT)
Age: 73
End: 2023-11-06

## 2023-11-06 RX ORDER — ATORVASTATIN CALCIUM 20 MG/1
20 TABLET, FILM COATED ORAL DAILY
Qty: 90 TABLET | Refills: 1 | Status: SHIPPED | OUTPATIENT
Start: 2023-11-06

## 2023-11-06 NOTE — TELEPHONE ENCOUNTER
Pt is calling because he needs a refill on atorvastatin 20 mg.pharmacy is confirmed. Pt is completely out of this medicine. Pt is leaving to go out of town on [id number]    [phone] patient

## 2023-11-06 NOTE — TELEPHONE ENCOUNTER
Request for Atorvastatin 20 mg daily. Last office visit 4/20/23, next office visit 5/14/24. Refills per verbal order from Dr. Moe Lira. Verified patient with two types of identifiers. Notified prescription has been sent into his pharmacy. Patient verbalized understanding and will call with any other questions.

## 2023-11-07 ENCOUNTER — TELEPHONE (OUTPATIENT)
Age: 73
End: 2023-11-07

## 2023-11-07 DIAGNOSIS — I49.3 PVC (PREMATURE VENTRICULAR CONTRACTION): Primary | ICD-10-CM

## 2023-11-07 RX ORDER — AMLODIPINE BESYLATE 2.5 MG/1
2.5 TABLET ORAL DAILY
Qty: 30 TABLET | Refills: 5 | Status: SHIPPED | OUTPATIENT
Start: 2023-11-07

## 2023-11-07 NOTE — TELEPHONE ENCOUNTER
Verified patient with two types of identifiers. Patient states that his heart rate has been running low and he does not feel like it is picking up as quickly when he exercises. He states that when he was hiking last week he felt very tired at the beginning of the hike; by the end he was finally feeling better. His heart rate has remained in the 40s. His SBP has been averaging 140-150s with intermittent measurements in the 130s. He does not feel PVCs as he usually does when they occur, but states that his chest feels the way it does when he is having PVCs. Patient is no longer taking flecainide;  he stopped last week. Has PCP appointment tomorrow; asked patient to have EKG done. Will inform MD/NP and return call. Patient verbalized understanding and will call with any other questions.

## 2023-11-07 NOTE — TELEPHONE ENCOUNTER
Verified patient with two types of identifiers. Spoke with patient and discussed wearing 7 day holter and adding amlodpine for his blood pressure. He has agreed to proceed. Patient verbalized understanding and will call with any other questions.

## 2023-11-07 NOTE — TELEPHONE ENCOUNTER
It doesn't appear that he's on anything that would suppress HR since he's stopped flecainide. HR may actually be low, or it could be reflective of frequent PVCs. Please check 7 day holter; it will allow us to check for rate, chronotropic variability, & PVC burden. For elevated BP, he can try amlodipine 2.5 mg po daily. It shouldn't affect his HR.

## 2023-11-07 NOTE — TELEPHONE ENCOUNTER
Enrolled with [employer] - Ordered and being shipped to patient's home address on file. ETA within 5-7 business days. Extended Holter  Received: Today  FERCHO Nichole; Bonnielee Severance  Please set up patient for 7 day holter per [facility]. DX PVCs. Thank you!

## 2023-11-07 NOTE — TELEPHONE ENCOUNTER
Patient is calling because he is feeling a little tired more than usual.Pt said his BP has been elevated 155/78 on 11/6/23. Pt said when he does his group hike he has been feeling a little short winded. Pt was wondering if he needs to come in to be seen.     [phone]

## 2023-11-08 ENCOUNTER — OFFICE VISIT (OUTPATIENT)
Age: 73
End: 2023-11-08
Payer: MEDICARE

## 2023-11-08 VITALS
HEART RATE: 50 BPM | SYSTOLIC BLOOD PRESSURE: 144 MMHG | HEIGHT: 67 IN | DIASTOLIC BLOOD PRESSURE: 80 MMHG | BODY MASS INDEX: 25.08 KG/M2 | OXYGEN SATURATION: 99 % | TEMPERATURE: 97.6 F | RESPIRATION RATE: 18 BRPM | WEIGHT: 159.8 LBS

## 2023-11-08 DIAGNOSIS — I49.3 PVC (PREMATURE VENTRICULAR CONTRACTION): ICD-10-CM

## 2023-11-08 DIAGNOSIS — E78.00 HYPERCHOLESTEREMIA: ICD-10-CM

## 2023-11-08 DIAGNOSIS — N40.0 BENIGN PROSTATIC HYPERPLASIA WITHOUT LOWER URINARY TRACT SYMPTOMS: ICD-10-CM

## 2023-11-08 DIAGNOSIS — Z85.828 HISTORY OF SKIN CANCER: ICD-10-CM

## 2023-11-08 DIAGNOSIS — Z00.00 ENCOUNTER FOR SUBSEQUENT ANNUAL WELLNESS VISIT (AWV) IN MEDICARE PATIENT: Primary | ICD-10-CM

## 2023-11-08 DIAGNOSIS — R73.01 ELEVATED FASTING GLUCOSE: ICD-10-CM

## 2023-11-08 DIAGNOSIS — Z12.5 SCREENING PSA (PROSTATE SPECIFIC ANTIGEN): ICD-10-CM

## 2023-11-08 DIAGNOSIS — I10 PRIMARY HYPERTENSION: ICD-10-CM

## 2023-11-08 PROCEDURE — 1036F TOBACCO NON-USER: CPT | Performed by: INTERNAL MEDICINE

## 2023-11-08 PROCEDURE — 99213 OFFICE O/P EST LOW 20 MIN: CPT | Performed by: INTERNAL MEDICINE

## 2023-11-08 PROCEDURE — G8427 DOCREV CUR MEDS BY ELIG CLIN: HCPCS | Performed by: INTERNAL MEDICINE

## 2023-11-08 PROCEDURE — 1123F ACP DISCUSS/DSCN MKR DOCD: CPT | Performed by: INTERNAL MEDICINE

## 2023-11-08 PROCEDURE — 3079F DIAST BP 80-89 MM HG: CPT | Performed by: INTERNAL MEDICINE

## 2023-11-08 PROCEDURE — 3077F SYST BP >= 140 MM HG: CPT | Performed by: INTERNAL MEDICINE

## 2023-11-08 PROCEDURE — G8419 CALC BMI OUT NRM PARAM NOF/U: HCPCS | Performed by: INTERNAL MEDICINE

## 2023-11-08 PROCEDURE — G8484 FLU IMMUNIZE NO ADMIN: HCPCS | Performed by: INTERNAL MEDICINE

## 2023-11-08 PROCEDURE — G0439 PPPS, SUBSEQ VISIT: HCPCS | Performed by: INTERNAL MEDICINE

## 2023-11-08 PROCEDURE — 3017F COLORECTAL CA SCREEN DOC REV: CPT | Performed by: INTERNAL MEDICINE

## 2023-11-08 NOTE — ASSESSMENT & PLAN NOTE
Monitored by specialist- no acute findings meriting change in the plan   Follows regularly with derm Dr Sumit Garcia

## 2023-11-08 NOTE — ASSESSMENT & PLAN NOTE
Now off flecanide, BP a littler higher  Plan to start low dose amlodipine as per cardiology  Advised on home monitoring of BP and keep a log

## 2023-11-08 NOTE — ASSESSMENT & PLAN NOTE
Cardiology, Dr Salo Parra  Was on flecanide until recently, stopped to see if would help with bradycardia  Not feeling a lot of symptomatic PVCs

## 2023-11-08 NOTE — PROGRESS NOTES
back: Normal range of motion. Right lower leg: No edema. Left lower leg: No edema. Lymphadenopathy:      Cervical: No cervical adenopathy. Skin:     General: Skin is warm. Neurological:      General: No focal deficit present. Mental Status: He is alert and oriented to person, place, and time. Mental status is at baseline. Psychiatric:         Mood and Affect: Mood normal.         Thought Content: Thought content normal.         Judgment: Judgment normal.            No Known Allergies  Prior to Visit Medications    Medication Sig Taking?  Authorizing Provider   METAMUCIL FIBER PO Take by mouth Yes Provider, MD Dagmar   atorvastatin (LIPITOR) 20 MG tablet Take 1 tablet by mouth daily Yes Olinda Alexander MD   sildenafil (VIAGRA) 100 MG tablet Take 1 tablet by mouth as needed Yes Automatic Reconciliation, Ar   terazosin (HYTRIN) 5 MG capsule Take 1 capsule by mouth nightly Yes Automatic Reconciliation, Ar   amLODIPine (NORVASC) 2.5 MG tablet Take 1 tablet by mouth daily  Olinda Alexander MD       CareTe (Including outside providers/suppliers regularly involved in providing care):   Patient Care Team:  Samira Monroy MD as PCP - Baltazar Bentley MD as PCP - Empaneled Provider  Boris Sandoval MD (Dermatology)  Olinda Alexander MD as Consulting Physician (Cardiology)  Dotty Hu MD (Urology)  Diamond Glover MD (Gastroenterology)  Eduardo Redman MD (Ophthalmology)     Reviewed and updated this visit:  Tobacco  Allergies  Meds  Problems  Med Hx  Surg Hx  Soc Hx  Fam Hx

## 2023-11-08 NOTE — ASSESSMENT & PLAN NOTE
Lab Results   Component Value Date    LDLCALC 65.6 12/02/2022        Tolerating statin therapy, plan to continue current regimen pending lab results  Recheck labs to assess for response to medication, whether LDL is at target, and monitor for side effects

## 2023-11-08 NOTE — ASSESSMENT & PLAN NOTE
Follows with [facility], Dr Julia Montero  Symptoms controlled with current regimen  Now off finasteride, he requests PSA, will expect some increase with lack of suppression from that med

## 2023-11-09 ENCOUNTER — TELEPHONE (OUTPATIENT)
Age: 73
End: 2023-11-09

## 2023-11-09 LAB
ALBUMIN SERPL-MCNC: 3.9 G/DL (ref 3.5–5)
ALBUMIN/GLOB SERPL: 1.7 (ref 1.1–2.2)
ALP SERPL-CCNC: 85 U/L (ref 45–117)
ALT SERPL-CCNC: 32 U/L (ref 12–78)
ANION GAP SERPL CALC-SCNC: 3 MMOL/L (ref 5–15)
AST SERPL-CCNC: 24 U/L (ref 15–37)
BASOPHILS # BLD: 0 K/UL (ref 0–0.1)
BASOPHILS NFR BLD: 1 % (ref 0–1)
BILIRUB SERPL-MCNC: 0.5 MG/DL (ref 0.2–1)
BUN SERPL-MCNC: 15 MG/DL (ref 6–20)
BUN/CREAT SERPL: 16 (ref 12–20)
CALCIUM SERPL-MCNC: 9.4 MG/DL (ref 8.5–10.1)
CHLORIDE SERPL-SCNC: 107 MMOL/L (ref 97–108)
CHOLEST SERPL-MCNC: 140 MG/DL
CO2 SERPL-SCNC: 31 MMOL/L (ref 21–32)
CREAT SERPL-MCNC: 0.93 MG/DL (ref 0.7–1.3)
CREAT UR-MCNC: 92.6 MG/DL
DIFFERENTIAL METHOD BLD: NORMAL
EOSINOPHIL # BLD: 0.2 K/UL (ref 0–0.4)
EOSINOPHIL NFR BLD: 4 % (ref 0–7)
ERYTHROCYTE [DISTWIDTH] IN BLOOD BY AUTOMATED COUNT: 11.9 % (ref 11.5–14.5)
EST. AVERAGE GLUCOSE BLD GHB EST-MCNC: 94 MG/DL
GLOBULIN SER CALC-MCNC: 2.3 G/DL (ref 2–4)
GLUCOSE SERPL-MCNC: 94 MG/DL (ref 65–100)
HBA1C MFR BLD: 4.9 % (ref 4–5.6)
HCT VFR BLD AUTO: 48.2 % (ref 36.6–50.3)
HDLC SERPL-MCNC: 54 MG/DL
HDLC SERPL: 2.6 (ref 0–5)
HGB BLD-MCNC: 16 G/DL (ref 12.1–17)
IMM GRANULOCYTES # BLD AUTO: 0 K/UL (ref 0–0.04)
IMM GRANULOCYTES NFR BLD AUTO: 0 % (ref 0–0.5)
LDLC SERPL CALC-MCNC: 65.4 MG/DL (ref 0–100)
LYMPHOCYTES # BLD: 0.9 K/UL (ref 0.8–3.5)
LYMPHOCYTES NFR BLD: 20 % (ref 12–49)
MCH RBC QN AUTO: 32.1 PG (ref 26–34)
MCHC RBC AUTO-ENTMCNC: 33.2 G/DL (ref 30–36.5)
MCV RBC AUTO: 96.6 FL (ref 80–99)
MICROALBUMIN UR-MCNC: 0.53 MG/DL
MICROALBUMIN/CREAT UR-RTO: 6 MG/G (ref 0–30)
MONOCYTES # BLD: 0.4 K/UL (ref 0–1)
MONOCYTES NFR BLD: 10 % (ref 5–13)
NEUTS SEG # BLD: 2.9 K/UL (ref 1.8–8)
NEUTS SEG NFR BLD: 65 % (ref 32–75)
NRBC # BLD: 0 K/UL (ref 0–0.01)
NRBC BLD-RTO: 0 PER 100 WBC
PLATELET # BLD AUTO: 166 K/UL (ref 150–400)
PMV BLD AUTO: 10.9 FL (ref 8.9–12.9)
POTASSIUM SERPL-SCNC: 4.2 MMOL/L (ref 3.5–5.1)
PROT SERPL-MCNC: 6.2 G/DL (ref 6.4–8.2)
PSA SERPL-MCNC: 4.3 NG/ML (ref 0.01–4)
RBC # BLD AUTO: 4.99 M/UL (ref 4.1–5.7)
SODIUM SERPL-SCNC: 141 MMOL/L (ref 136–145)
TRIGL SERPL-MCNC: 103 MG/DL
TSH SERPL DL<=0.05 MIU/L-ACNC: 2.58 UIU/ML (ref 0.36–3.74)
VLDLC SERPL CALC-MCNC: 20.6 MG/DL
WBC # BLD AUTO: 4.4 K/UL (ref 4.1–11.1)

## 2023-11-09 NOTE — TELEPHONE ENCOUNTER
Verified patient with two types of identifiers. Spoke with patient and let him know that he had an ecg done within the last 6 months. His concern was with PVCS and I reassured him that this would be monitored by the holter he is ordered to wear. Patient verbalized understanding and will call with any other questions.

## 2023-11-09 NOTE — TELEPHONE ENCOUNTER
Pt. Had a physical done yesterday and didn't get an EKG done. Asking if he should come into the office here to have it done. Pt.  Phone - [phone]

## 2023-11-21 ENCOUNTER — PATIENT MESSAGE (OUTPATIENT)
Age: 73
End: 2023-11-21

## 2023-11-22 NOTE — TELEPHONE ENCOUNTER
Called patient and verified with two identifiers. He did push the button anytime he felt a symptom, even though we wont know exactly what symptom he was feeling we will know what time and when it happened on the monitor.

## 2023-12-11 ENCOUNTER — CLINICAL DOCUMENTATION (OUTPATIENT)
Age: 73
End: 2023-12-11

## 2023-12-11 ENCOUNTER — TELEPHONE (OUTPATIENT)
Age: 73
End: 2023-12-11

## 2023-12-11 RX ORDER — AMLODIPINE BESYLATE 5 MG/1
5 TABLET ORAL DAILY
Qty: 30 TABLET | Refills: 5 | Status: SHIPPED | OUTPATIENT
Start: 2023-12-11

## 2023-12-11 NOTE — PROGRESS NOTES
Holter 7 days  There were 23 Patient Triggers.  Sometimes with PVCs and mostly sinus bradycardia  PACs PAT  He wrote a note with it  I think if he is not feeling too bad (because he said it was long hike), he may not have to get pacer  PVC burden low

## 2023-12-11 NOTE — TELEPHONE ENCOUNTER
MD James Rios RN  Cc: Jeannine Perez RN; Dory Ugarte LPN  50 VE beats with a burden of <1 %. There were 1,052 SVE beats with a burden of <1 %. There were 3 occurrences of Supraventricular Tachycardia with the Longest episode 7 beats, 11/20 18:23:32, and the Fastest episode 126 bpm, 11/20 18:23:32. There were 23 Patient Triggers. Sometimes with PVCs and mostly sinus bradycardia      ----- Message from Calrissa Reeves MD sent at 12/11/2023  7:18 AM EST -----  Holter   There were 23 Patient Triggers.  Sometimes with PVCs and mostly sinus bradycardia    He wrote a long note  I think if he is not feeling too bad because he said it was long hike, he may not have to get pacer  PVC burden low

## 2023-12-11 NOTE — TELEPHONE ENCOUNTER
It's generally better to avoid taking BP readings so close together. When checking BP, also make sure it's not within 2 hours of taking his amlodipine & terazosin. It looks like he's had some other BP readings above goal as well. Recommend increasing amlodipine to 5 mg po daily. Continue to monitor. Goal is to have BP averaging <130/80.

## 2024-01-25 NOTE — PROGRESS NOTES
"-------  Education about Medial Branch Blockade and RF Therapy:    This medial branch blockade (MBB) suggested is intended for diagnostic purposes, with the intent of offering the patient Radiofrequency thermal rhizotomy (RF) if the MBB is diagnostically effective.  The diagnostic blockade is necessary to determine the likelihood that RF therapy could be efficacious in providing long term relief to the patient.    Medial branches are sensory nerve branches that connect to a facet joint and transmit sensations & pain signals from that joint.  Facet is a term for the type of joints found in the spine.  Medial branches are the nerves that go to a facet, and therefore are also sometimes called \"facet joint nerves\" (FJNs).      In a medial branch blockade procedure, xray fluoroscopy is used to verify the locations of the outside of the joint lines which are being targeted.  Under xray guidance, needles are placed to these areas.  Contrast dye is injected to confirm proper placement, with dye flowing over the joint area, and to ensure that the dye does not flow into unintended areas such as a vein.  When this is confirmed, local anesthetic is injected to block the medial branch at that joint level.      If MBBs are diagnostically successful in blocking pain, then the patient is most likely a great candidate for Radiofrequency of those facet joint nerves.  In the RF procedure, needles are placed to the joint lines in the same fashion, and after testing, the needle tips are heated to thermally treat the nerves, blocking the nerves by in essence damaging the nerves with the heat treatment(non-pulsed).       Medically, a successful RF procedure should provide a patient with 50% pain relief or more for at least 6 months.  Clinical experience suggests that successful patients receive relief more in the range of 12 months on average.  We also discussed that a fortunate minority of patients receive therapeutic success from the " Verified name and birth date for privacy precautions. Chart reviewed in preparation for today's visit. Chief Complaint   Patient presents with    Complete Physical          Health Maintenance Due   Topic    Medicare Yearly Exam          Wt Readings from Last 3 Encounters:   10/19/21 156 lb (70.8 kg)   07/20/21 159 lb (72.1 kg)   12/08/20 165 lb (74.8 kg)     Temp Readings from Last 3 Encounters:   10/19/21 96.8 °F (36 °C) (Temporal)   12/19/19 97.6 °F (36.4 °C)   12/18/19 97.8 °F (36.6 °C)     BP Readings from Last 3 Encounters:   10/19/21 116/73   07/20/21 126/70   12/08/20 120/72     Pulse Readings from Last 3 Encounters:   10/19/21 (!) 59   07/20/21 60   12/08/20 (!) 56         Learning Assessment:  :     Learning Assessment 5/2/2018 7/31/2014   PRIMARY LEARNER Patient Patient   HIGHEST LEVEL OF EDUCATION - PRIMARY LEARNER  - > 4 YEARS OF COLLEGE   BARRIERS PRIMARY LEARNER - NONE   CO-LEARNER CAREGIVER - No   PRIMARY LANGUAGE ENGLISH ENGLISH   LEARNER PREFERENCE PRIMARY OTHER (COMMENT) READING   ANSWERED BY patient self   RELATIONSHIP SELF SELF       Depression Screening:  :     3 most recent PHQ Screens 10/19/2021   Little interest or pleasure in doing things Not at all   Feeling down, depressed, irritable, or hopeless Not at all   Total Score PHQ 2 0       Fall Risk Assessment:  :     Fall Risk Assessment, last 12 mths 10/19/2021   Able to walk? Yes   Fall in past 12 months? 0   Do you feel unsteady? 0   Are you worried about falling 0   Number of falls in past 12 months -   Fall with injury? -       Abuse Screening:  :     Abuse Screening Questionnaire 10/19/2021   Do you ever feel afraid of your partner? N   Are you in a relationship with someone who physically or mentally threatens you? N   Is it safe for you to go home?  Promise Charus MBB, and may not require RF ablation.  If a patient receives more than 8 weeks of relief from MBB, then occasional repeat MBB for therapeutic purposes is a very reasonable alternative therapy.  This course of therapy is consistent with our LCDs according to our CMS  in the area, and therefore other insurance providers should follow accordingly.  We will monitor our patients to screen for these therapeutic responders and will offer RF therapy only when necessary.        We discussed that MBB & RF are not without risks.  Guidelines regarding anticoagulant use & neuraxial procedures will be respected.  Patients that are ill or otherwise may be at risk for sepsis will not have their spines accessed by neuraxial injections of any type.  This patient will not be offered these therapies if there is an increased risk.   We discussed that there is a risk of postprocedural pain and also a risk of worsening of clinical picture with these procedures as with any neuraxial procedure.    -------    Discussed with the patient that sedation is optional for this procedure.  The sedation offered is called conscious sedation which is different from general anesthesia that is utilized in surgical procedures. The dosing of the sedation is determined by the physician and they will be monitored throughout the procedure. With conscious sedation it is possible to remember parts or all of the procedure, this is normal. They will need to have a  with them as driving is prohibited following conscious sedation.      NPO instructions for conscious sedation:  --- Do not eat 6 hours prior to the procedure.   --- Do not drink any dairy or citrus 4 hours prior to the procedure.   --- Do not drink anything, including clear liquids, 2 hours prior to procedure.      If the NPO instructions are not followed then the procedure may be performed without sedation or the procedure will need to be rescheduled.

## 2024-02-23 ENCOUNTER — PATIENT MESSAGE (OUTPATIENT)
Age: 74
End: 2024-02-23

## 2024-03-21 ENCOUNTER — TELEPHONE (OUTPATIENT)
Age: 74
End: 2024-03-21

## 2024-03-21 NOTE — TELEPHONE ENCOUNTER
Spoke to patient and rescheduled appointment with Dr Nicole on 05/14/2024. Patient confirmed understanding of new date, time, and provider.     Future Appointments   Date Time Provider Department Center   7/10/2024  8:40 AM Savannah Barron APRN - [facility]       He stated that he has been having some more elevated BP readings and was a little concerned about that. I told him I would let the nurse know and that they may reach out to him for some readings.     Thanks,  Becky

## 2024-04-02 ENCOUNTER — PATIENT MESSAGE (OUTPATIENT)
Age: 74
End: 2024-04-02

## 2024-04-02 NOTE — TELEPHONE ENCOUNTER
Jose J Arboleda, RN 4/2/2024 12:08 PM EDT    Patient mentioned that he had been having higher blood pressure. List is attached.  ----- Message -----  From: Radha Balderas RN  Sent: 4/2/2024 12:03 PM EDT  To: Jose J Arboleda RN; Cheri Burnette LPN  Subject: FW: Recent Blood Pressure Readings       ----- Message -----  From: Gerard Keen \"Marylin\"  Sent: 4/2/2024 12:01 PM EDT  To: [facility] Clinical Staff  Subject: Recent Blood Pressure Readings     Renato Lux:    Apologies for not getting this to you sooner, but for some reason I had to manually enter the readings to the enclosed chart and it took me a while to get to it. As I look through the chart, I see that I have some high and a number of relatively normal readings. The average systolic is in the slightly elevated range. My pulse is relatively low. I have been taking 5 mg amlodipine daily and exercising regularly. Please let me know if you have any questions. Thanks.    Marylin Keen

## 2024-04-02 NOTE — TELEPHONE ENCOUNTER
Please try increasing amlodipine to 5 mg po bid (to avoid large drop in BP).  He had a couple normal readings, but average BP remains above goal.

## 2024-04-03 RX ORDER — AMLODIPINE BESYLATE 5 MG/1
5 TABLET ORAL 2 TIMES DAILY
Qty: 60 TABLET | Refills: 3 | Status: SHIPPED | OUTPATIENT
Start: 2024-04-03

## 2024-04-29 RX ORDER — ATORVASTATIN CALCIUM 20 MG/1
20 TABLET, FILM COATED ORAL DAILY
Qty: 90 TABLET | Refills: 1 | Status: SHIPPED | OUTPATIENT
Start: 2024-04-29

## 2024-04-29 NOTE — TELEPHONE ENCOUNTER
Requested Prescriptions     Signed Prescriptions Disp Refills    atorvastatin (LIPITOR) 20 MG tablet 90 tablet 1     Sig: TAKE 1 TABLET BY MOUTH DAILY     Authorizing Provider: SIMONE ORTEGA     Ordering User: ASHLEY CARVAJAL refilled per VO by MD.    Future Appointments   Date Time Provider Department Center   7/10/2024  8:40 AM Savannah Barron, APRN - [facility]

## 2024-05-14 ENCOUNTER — PATIENT MESSAGE (OUTPATIENT)
Age: 74
End: 2024-05-14

## 2024-05-16 NOTE — TELEPHONE ENCOUNTER
It looks like HR has historically trended in the 40s-50s.  Occasional PVCs noted with sinus bradycardia on the strips that he sent.  I don't see any reason why he can't travel as planned.

## 2024-05-16 NOTE — TELEPHONE ENCOUNTER
Jose J Arboleda RN 5/16/2024 8:16 AM EDT    ----- Message from Jose J Arboleda RN sent at 5/16/2024 8:16 AM EDT -----       ----- Message from Gerard Keen \"Marylin\" to Gunner Nicole MD sent at 5/15/2024 12:37 PM -----   PS: im not noticing any episodes so far today. Heart rate is between 49 and 51 or so. BP was 119/59 at 7:25 this morning and 141/70 at 12:33 - a few minutes ago (after running upstairs to take it and 130/50 at 12:36 after sitting for a couple of minutes.       ----- Message -----   From:Gerard Keen   Sent:5/15/2024 11:53 AM EDT   To:Patient Medical Advice Request Message List   Subject:Heart rhhythms    I feel the PVC or “skipped” beats as a lightness or tightness in my chest. It’s not anything painful. No shortness of breath. And no discomfort or noticeable arrhythmia when I’m exercising vigorously (which I do regularly). When I have these episodes, I’m generally relaxed and notice that my heartrate is in the upper 40s or low 50s.       ----- Message -----   From:AMBER MORRIS   Sent:5/15/2024 9:26 AM EDT   To:Gerard Keen   Subject:Heart rhhythms    Good morning Mr. Keen--    Are you having any symptoms when you experience these arrhythmias?    Jose J CLARK      ----- Message -----   From:Gerard Keen   Sent:5/14/2024 5:55 PM EDT   To:Dr. Gunner Nicole   Subject:Heart rhhythms    Hi Dr. Nicole. I have been sensing some heart arrhythmias over the last couple of days. I don't know whether they are just benign or not, but I am attaching a few charts that I did with my watch a short while ago. I'm scheduled to go to [place] on Sunday for about 9 days. Is there any reason to rethink that?    Thanks,  Marylin Keen

## 2024-05-17 ENCOUNTER — TELEPHONE (OUTPATIENT)
Age: 74
End: 2024-05-17

## 2024-05-17 DIAGNOSIS — G47.25 JET LAG: Primary | ICD-10-CM

## 2024-05-17 RX ORDER — TAMSULOSIN HYDROCHLORIDE 0.4 MG/1
CAPSULE ORAL
COMMUNITY
Start: 2024-05-13

## 2024-05-17 RX ORDER — ZOLPIDEM TARTRATE 5 MG/1
5 TABLET ORAL NIGHTLY PRN
Qty: 6 TABLET | Refills: 0 | Status: SHIPPED | OUTPATIENT
Start: 2024-05-17 | End: 2024-06-28

## 2024-05-17 NOTE — TELEPHONE ENCOUNTER
Patient is flying to [place] in a few days and would like to know if Dr. Mittal could send in a script for 4 or 5 Ambien pills to help him sleep on the flight.    [facility] [id number] - [place], [place] - [address] - P [phone] - F [phone]    Gerard Keen  [phone]

## 2024-05-17 NOTE — TELEPHONE ENCOUNTER
Verified patient identity with two identifiers. Spoke with patient by phone reviewed MD message with patient Patient verbalized understanding.

## 2024-07-29 ENCOUNTER — HOSPITAL ENCOUNTER (EMERGENCY)
Facility: HOSPITAL | Age: 74
Discharge: HOME | End: 2024-07-29
Attending: EMERGENCY MEDICINE
Payer: MEDICARE

## 2024-07-29 ENCOUNTER — APPOINTMENT (OUTPATIENT)
Facility: HOSPITAL | Age: 74
End: 2024-07-29
Payer: MEDICARE

## 2024-07-29 VITALS
HEIGHT: 67 IN | HEART RATE: 55 BPM | SYSTOLIC BLOOD PRESSURE: 155 MMHG | OXYGEN SATURATION: 96 % | TEMPERATURE: 98.5 F | WEIGHT: 162.48 LBS | RESPIRATION RATE: 18 BRPM | DIASTOLIC BLOOD PRESSURE: 78 MMHG | BODY MASS INDEX: 25.5 KG/M2

## 2024-07-29 DIAGNOSIS — R07.9 CHEST PAIN, UNSPECIFIED TYPE: Primary | ICD-10-CM

## 2024-07-29 LAB
ALBUMIN SERPL-MCNC: 3.7 G/DL (ref 3.5–5)
ALBUMIN/GLOB SERPL: 1.3 (ref 1.1–2.2)
ALP SERPL-CCNC: 88 U/L (ref 45–117)
ALT SERPL-CCNC: 33 U/L (ref 12–78)
ANION GAP SERPL CALC-SCNC: 4 MMOL/L (ref 5–15)
AST SERPL-CCNC: 31 U/L (ref 15–37)
BASOPHILS # BLD: 0 K/UL (ref 0–0.1)
BASOPHILS NFR BLD: 1 % (ref 0–1)
BILIRUB SERPL-MCNC: 0.5 MG/DL (ref 0.2–1)
BUN SERPL-MCNC: 18 MG/DL (ref 6–20)
BUN/CREAT SERPL: 20 (ref 12–20)
CALCIUM SERPL-MCNC: 9.6 MG/DL (ref 8.5–10.1)
CHLORIDE SERPL-SCNC: 108 MMOL/L (ref 97–108)
CO2 SERPL-SCNC: 29 MMOL/L (ref 21–32)
COMMENT:: NORMAL
CREAT SERPL-MCNC: 0.88 MG/DL (ref 0.7–1.3)
DIFFERENTIAL METHOD BLD: NORMAL
EKG ATRIAL RATE: 49 BPM
EKG DIAGNOSIS: NORMAL
EKG P AXIS: 57 DEGREES
EKG P-R INTERVAL: 260 MS
EKG Q-T INTERVAL: 412 MS
EKG QRS DURATION: 100 MS
EKG QTC CALCULATION (BAZETT): 372 MS
EKG R AXIS: 51 DEGREES
EKG T AXIS: 3 DEGREES
EKG VENTRICULAR RATE: 49 BPM
EOSINOPHIL # BLD: 0.2 K/UL (ref 0–0.4)
EOSINOPHIL NFR BLD: 4 % (ref 0–7)
ERYTHROCYTE [DISTWIDTH] IN BLOOD BY AUTOMATED COUNT: 12.2 % (ref 11.5–14.5)
GLOBULIN SER CALC-MCNC: 2.9 G/DL (ref 2–4)
GLUCOSE SERPL-MCNC: 92 MG/DL (ref 65–100)
HCT VFR BLD AUTO: 42.5 % (ref 36.6–50.3)
HGB BLD-MCNC: 15.1 G/DL (ref 12.1–17)
IMM GRANULOCYTES # BLD AUTO: 0 K/UL (ref 0–0.04)
IMM GRANULOCYTES NFR BLD AUTO: 0 % (ref 0–0.5)
LYMPHOCYTES # BLD: 1.4 K/UL (ref 0.8–3.5)
LYMPHOCYTES NFR BLD: 26 % (ref 12–49)
MCH RBC QN AUTO: 32.8 PG (ref 26–34)
MCHC RBC AUTO-ENTMCNC: 35.5 G/DL (ref 30–36.5)
MCV RBC AUTO: 92.2 FL (ref 80–99)
MONOCYTES # BLD: 0.6 K/UL (ref 0–1)
MONOCYTES NFR BLD: 11 % (ref 5–13)
NEUTS SEG # BLD: 3.1 K/UL (ref 1.8–8)
NEUTS SEG NFR BLD: 58 % (ref 32–75)
NRBC # BLD: 0 K/UL (ref 0–0.01)
NRBC BLD-RTO: 0 PER 100 WBC
PLATELET # BLD AUTO: 158 K/UL (ref 150–400)
PMV BLD AUTO: 10.1 FL (ref 8.9–12.9)
POTASSIUM SERPL-SCNC: 3.8 MMOL/L (ref 3.5–5.1)
PROT SERPL-MCNC: 6.6 G/DL (ref 6.4–8.2)
RBC # BLD AUTO: 4.61 M/UL (ref 4.1–5.7)
SODIUM SERPL-SCNC: 141 MMOL/L (ref 136–145)
SPECIMEN HOLD: NORMAL
TROPONIN I SERPL HS-MCNC: 6 NG/L (ref 0–76)
TROPONIN I SERPL HS-MCNC: 8 NG/L (ref 0–76)
WBC # BLD AUTO: 5.3 K/UL (ref 4.1–11.1)

## 2024-07-29 PROCEDURE — 85025 COMPLETE CBC W/AUTO DIFF WBC: CPT

## 2024-07-29 PROCEDURE — 99285 EMERGENCY DEPT VISIT HI MDM: CPT

## 2024-07-29 PROCEDURE — 93005 ELECTROCARDIOGRAM TRACING: CPT | Performed by: EMERGENCY MEDICINE

## 2024-07-29 PROCEDURE — 71046 X-RAY EXAM CHEST 2 VIEWS: CPT

## 2024-07-29 PROCEDURE — 36415 COLL VENOUS BLD VENIPUNCTURE: CPT

## 2024-07-29 PROCEDURE — 84484 ASSAY OF TROPONIN QUANT: CPT

## 2024-07-29 PROCEDURE — 80053 COMPREHEN METABOLIC PANEL: CPT

## 2024-07-29 ASSESSMENT — PAIN - FUNCTIONAL ASSESSMENT
PAIN_FUNCTIONAL_ASSESSMENT: ACTIVITIES ARE NOT PREVENTED
PAIN_FUNCTIONAL_ASSESSMENT: 0-10

## 2024-07-29 ASSESSMENT — PAIN DESCRIPTION - ORIENTATION: ORIENTATION: RIGHT

## 2024-07-29 ASSESSMENT — PAIN SCALES - GENERAL: PAINLEVEL_OUTOF10: 5

## 2024-07-29 ASSESSMENT — PAIN DESCRIPTION - LOCATION: LOCATION: CHEST

## 2024-07-29 ASSESSMENT — PAIN DESCRIPTION - DESCRIPTORS: DESCRIPTORS: SHARP

## 2024-07-29 NOTE — ED TRIAGE NOTES
Patient arrives to ED reports intermittent right sided chest pain since this afternoon. Reports only prior cardiac history is PVCs

## 2024-10-24 RX ORDER — AMLODIPINE BESYLATE 5 MG/1
5 TABLET ORAL 2 TIMES DAILY
Qty: 60 TABLET | Refills: 0 | OUTPATIENT
Start: 2024-10-24

## 2024-10-24 RX ORDER — ATORVASTATIN CALCIUM 20 MG/1
20 TABLET, FILM COATED ORAL DAILY
Qty: 90 TABLET | Refills: 1 | OUTPATIENT
Start: 2024-10-24

## 2025-03-04 ENCOUNTER — TRANSCRIBE ORDERS (OUTPATIENT)
Facility: HOSPITAL | Age: 75
End: 2025-03-04

## 2025-03-04 DIAGNOSIS — R97.20 ELEVATED PROSTATE SPECIFIC ANTIGEN (PSA): Primary | ICD-10-CM

## 2025-03-26 ENCOUNTER — HOSPITAL ENCOUNTER (OUTPATIENT)
Facility: HOSPITAL | Age: 75
Discharge: HOME | End: 2025-03-29
Attending: UROLOGY
Payer: MEDICARE

## 2025-03-26 DIAGNOSIS — R97.20 ELEVATED PROSTATE SPECIFIC ANTIGEN (PSA): ICD-10-CM

## 2025-03-26 PROCEDURE — 72197 MRI PELVIS W/O & W/DYE: CPT

## 2025-03-26 PROCEDURE — A9579 GAD-BASE MR CONTRAST NOS,1ML: HCPCS | Performed by: UROLOGY

## 2025-03-26 PROCEDURE — 6360000004 HC RX CONTRAST MEDICATION: Performed by: UROLOGY

## 2025-03-26 RX ORDER — 0.9 % SODIUM CHLORIDE 0.9 %
100 INTRAVENOUS SOLUTION INTRAVENOUS ONCE
Status: DISCONTINUED | OUTPATIENT
Start: 2025-03-26 | End: 2025-03-30 | Stop reason: HOSPADM

## 2025-03-26 RX ORDER — SODIUM CHLORIDE 0.9 % (FLUSH) 0.9 %
5-40 SYRINGE (ML) INJECTION 2 TIMES DAILY
Status: DISCONTINUED | OUTPATIENT
Start: 2025-03-26 | End: 2025-03-30 | Stop reason: HOSPADM

## 2025-03-26 RX ADMIN — GADOTERIDOL 15 ML: 279.3 INJECTION, SOLUTION INTRAVENOUS at 20:49

## (undated) DEVICE — Z DISCONTINUED USE 2751540 TUBING IRRIG L10IN DISP PMP ENDOGATOR

## (undated) DEVICE — INFECTION CONTROL KIT SYS

## (undated) DEVICE — GARMENT,MEDLINE,DVT,INT,CALF,MED, GEN2: Brand: MEDLINE

## (undated) DEVICE — Z DISCONTINUED NO SUB IDED SET EXTN W/ 4 W STPCOCK M SPIN LOK 36IN

## (undated) DEVICE — SNARE ENDOSCP M L240CM W27MM SHTH DIA2.4MM CHN 2.8MM HEX

## (undated) DEVICE — 3M™ MEDIPORE™ H SOFT CLOTH SURGICAL TAPE, 2863, 3 IN X 10 YD, 12/CASE: Brand: 3M™ MEDIPORE™

## (undated) DEVICE — Device

## (undated) DEVICE — CATH IV AUTOGRD BC BLU 22GA 25 -- INSYTE

## (undated) DEVICE — BAG SPEC BIOHZD LF 2MIL 6X10IN -- CONVERT TO ITEM 357326

## (undated) DEVICE — BAG BELONG PT PERS CLEAR HANDL

## (undated) DEVICE — CONTAINER SPEC 20 ML LID NEUT BUFF FORMALIN 10 % POLYPR STS

## (undated) DEVICE — SHOULDER SUSPENSION KIT 6 PER BOX

## (undated) DEVICE — MAT SUCT W36XL48IN FLD CTRL DISP

## (undated) DEVICE — SPONGE GZ W4XL4IN COT 12 PLY TYP VII WVN C FLD DSGN

## (undated) DEVICE — PAD,ABDOMINAL,5"X9",ST,LF,25/BX: Brand: MEDLINE INDUSTRIES, INC.

## (undated) DEVICE — CONNECTOR TBNG AUX H2O JET DISP FOR OLY 160/180 SER

## (undated) DEVICE — STERILE POLYISOPRENE POWDER-FREE SURGICAL GLOVES: Brand: PROTEXIS

## (undated) DEVICE — KENDALL RADIOLUCENT FOAM MONITORING ELECTRODE -RECTANGULAR SHAPE: Brand: KENDALL

## (undated) DEVICE — 3M™ STERI-DRAPE™ U-DRAPE 1015: Brand: STERI-DRAPE™

## (undated) DEVICE — SNARE ENDOSCP M L240CM W27MM SHTH DIA2.4MM CHN 2.8MM OVL

## (undated) DEVICE — SOLIDIFIER FLUID 3000 CC ABSORB

## (undated) DEVICE — NEEDLE HYPO 18GA L1.5IN PNK S STL HUB POLYPR SHLD REG BVL

## (undated) DEVICE — SET ADMIN 16ML TBNG L100IN 2 Y INJ SITE IV PIGGY BK DISP

## (undated) DEVICE — 4.5 MM INCISOR STRAIGHT BLADES,                                    POWER/EP-1, LIME GREEN, PACKAGED 6                                    PER BOX, STERILE

## (undated) DEVICE — 1200 GUARD II KIT W/5MM TUBE W/O VAC TUBE: Brand: GUARDIAN

## (undated) DEVICE — (D)PREP SKN CHLRAPRP APPL 26ML -- CONVERT TO ITEM 371833

## (undated) DEVICE — AIRLIFE™ U/CONNECT-IT OXYGEN TUBING 7 FEET (2.1 M) CRUSH-RESISTANT OXYGEN TUBING, VINYL TIPPED: Brand: AIRLIFE™

## (undated) DEVICE — GOWN,PREVENTION PLUS,XLN/XL,ST,24/CS: Brand: MEDLINE

## (undated) DEVICE — SUT ETHLN 3-0 18IN PS2 BLK --

## (undated) DEVICE — TRAP SURG QUAD PARABOLA SLOT DSGN SFTY SCRN TRAPEASE

## (undated) DEVICE — SOLUTION IRRIG 3000ML LAC R FLX CONT

## (undated) DEVICE — DRAPE,EXTREMITY,89X128,STERILE: Brand: MEDLINE

## (undated) DEVICE — DRAPE,REIN 53X77,STERILE: Brand: MEDLINE

## (undated) DEVICE — QUILTED PREMIUM COMFORT UNDERPAD,EXTRA HEAVY: Brand: WINGS

## (undated) DEVICE — REM POLYHESIVE ADULT PATIENT RETURN ELECTRODE: Brand: VALLEYLAB

## (undated) DEVICE — PROBE COAG L330CM DIA2.3MM STR FIRE ARC SMRT

## (undated) DEVICE — Z DISCONTINUED USE 2275686 GLOVE SURG SZ 8 L12IN FNGR THK13MIL WHT ISOLEX POLYISOPRENE

## (undated) DEVICE — CONNECTOR ELECSURG ARCONNECT AR PRB SGL USE DISP

## (undated) DEVICE — SET IRRIG W 96IN TBNG 4 LN FLX BG

## (undated) DEVICE — Device: Brand: MEDICAL ACTION INDUSTRIES

## (undated) DEVICE — ARTHROSCOPY RICHMOND-LF: Brand: MEDLINE INDUSTRIES, INC.

## (undated) DEVICE — TAPE,CLOTH/SILK,CURAD,3"X10YD,LF,40/CS: Brand: CURAD

## (undated) DEVICE — 4.5 MM HELICUT STRAIGHT BURRS,                                    POWER/EP-1, SLATE, 5000 MAXIMUM RPM,                                    PACKAGED 6 PER BOX, STERILE: Brand: DYONICS HELICUT

## (undated) DEVICE — 4-PORT MANIFOLD: Brand: NEPTUNE 2

## (undated) DEVICE — SYRINGE MED 20ML STD CLR PLAS LUERLOCK TIP N CTRL DISP

## (undated) DEVICE — BW-412T DISP COMBO CLEANING BRUSH: Brand: SINGLE USE COMBINATION CLEANING BRUSH

## (undated) DEVICE — ENDO CARRY-ON PROCEDURE KIT INCLUDES ENZYMATIC SPONGE, GAUZE, BIOHAZARD LABEL, TRAY, LUBRICANT, DIRTY SCOPE LABEL, WATER LABEL, TRAY, DRAWSTRING PAD, AND DEFENDO 4-PIECE KIT.: Brand: ENDO CARRY-ON PROCEDURE KIT